# Patient Record
Sex: MALE | Race: BLACK OR AFRICAN AMERICAN | NOT HISPANIC OR LATINO
[De-identification: names, ages, dates, MRNs, and addresses within clinical notes are randomized per-mention and may not be internally consistent; named-entity substitution may affect disease eponyms.]

---

## 2017-01-19 ENCOUNTER — RESULT REVIEW (OUTPATIENT)
Age: 82
End: 2017-01-19

## 2017-07-23 ENCOUNTER — INPATIENT (INPATIENT)
Facility: HOSPITAL | Age: 82
LOS: 4 days | Discharge: HOME | DRG: 674 | End: 2017-07-28
Attending: UROLOGY | Admitting: UROLOGY
Payer: COMMERCIAL

## 2017-07-23 VITALS
HEART RATE: 82 BPM | DIASTOLIC BLOOD PRESSURE: 103 MMHG | TEMPERATURE: 98 F | OXYGEN SATURATION: 99 % | RESPIRATION RATE: 19 BRPM | SYSTOLIC BLOOD PRESSURE: 157 MMHG

## 2017-07-23 DIAGNOSIS — T83.9XXA UNSPECIFIED COMPLICATION OF GENITOURINARY PROSTHETIC DEVICE, IMPLANT AND GRAFT, INITIAL ENCOUNTER: ICD-10-CM

## 2017-07-23 DIAGNOSIS — Z95.0 PRESENCE OF CARDIAC PACEMAKER: Chronic | ICD-10-CM

## 2017-07-23 DIAGNOSIS — Z98.89 OTHER SPECIFIED POSTPROCEDURAL STATES: Chronic | ICD-10-CM

## 2017-07-23 LAB
ALBUMIN SERPL ELPH-MCNC: 3.3 G/DL — SIGNIFICANT CHANGE UP (ref 3.3–5)
ALP SERPL-CCNC: 118 U/L — SIGNIFICANT CHANGE UP (ref 40–120)
ALT FLD-CCNC: 10 U/L RC — SIGNIFICANT CHANGE UP (ref 10–45)
ANION GAP SERPL CALC-SCNC: 13 MMOL/L — SIGNIFICANT CHANGE UP (ref 5–17)
APTT BLD: 30.7 SEC — SIGNIFICANT CHANGE UP (ref 27.5–37.4)
AST SERPL-CCNC: 13 U/L — SIGNIFICANT CHANGE UP (ref 10–40)
BASOPHILS # BLD AUTO: 0 K/UL — SIGNIFICANT CHANGE UP (ref 0–0.2)
BASOPHILS NFR BLD AUTO: 0.3 % — SIGNIFICANT CHANGE UP (ref 0–2)
BILIRUB SERPL-MCNC: 0.4 MG/DL — SIGNIFICANT CHANGE UP (ref 0.2–1.2)
BLD GP AB SCN SERPL QL: NEGATIVE — SIGNIFICANT CHANGE UP
BUN SERPL-MCNC: 16 MG/DL — SIGNIFICANT CHANGE UP (ref 7–23)
CALCIUM SERPL-MCNC: 9 MG/DL — SIGNIFICANT CHANGE UP (ref 8.4–10.5)
CHLORIDE SERPL-SCNC: 109 MMOL/L — HIGH (ref 96–108)
CO2 SERPL-SCNC: 23 MMOL/L — SIGNIFICANT CHANGE UP (ref 22–31)
CREAT SERPL-MCNC: 1.09 MG/DL — SIGNIFICANT CHANGE UP (ref 0.5–1.3)
EOSINOPHIL # BLD AUTO: 0.1 K/UL — SIGNIFICANT CHANGE UP (ref 0–0.5)
EOSINOPHIL NFR BLD AUTO: 1.6 % — SIGNIFICANT CHANGE UP (ref 0–6)
GLUCOSE SERPL-MCNC: 102 MG/DL — HIGH (ref 70–99)
HCT VFR BLD CALC: 38.2 % — LOW (ref 39–50)
HGB BLD-MCNC: 12.2 G/DL — LOW (ref 13–17)
INR BLD: 1.48 RATIO — HIGH (ref 0.88–1.16)
LYMPHOCYTES # BLD AUTO: 1 K/UL — SIGNIFICANT CHANGE UP (ref 1–3.3)
LYMPHOCYTES # BLD AUTO: 17.7 % — SIGNIFICANT CHANGE UP (ref 13–44)
MCHC RBC-ENTMCNC: 30.2 PG — SIGNIFICANT CHANGE UP (ref 27–34)
MCHC RBC-ENTMCNC: 31.9 GM/DL — LOW (ref 32–36)
MCV RBC AUTO: 94.7 FL — SIGNIFICANT CHANGE UP (ref 80–100)
MONOCYTES # BLD AUTO: 0.6 K/UL — SIGNIFICANT CHANGE UP (ref 0–0.9)
MONOCYTES NFR BLD AUTO: 9.6 % — SIGNIFICANT CHANGE UP (ref 2–14)
NEUTROPHILS # BLD AUTO: 4.1 K/UL — SIGNIFICANT CHANGE UP (ref 1.8–7.4)
NEUTROPHILS NFR BLD AUTO: 70.8 % — SIGNIFICANT CHANGE UP (ref 43–77)
PLATELET # BLD AUTO: 161 K/UL — SIGNIFICANT CHANGE UP (ref 150–400)
POTASSIUM SERPL-MCNC: 4 MMOL/L — SIGNIFICANT CHANGE UP (ref 3.5–5.3)
POTASSIUM SERPL-SCNC: 4 MMOL/L — SIGNIFICANT CHANGE UP (ref 3.5–5.3)
PROT SERPL-MCNC: 7.3 G/DL — SIGNIFICANT CHANGE UP (ref 6–8.3)
PROTHROM AB SERPL-ACNC: 16.1 SEC — HIGH (ref 9.8–12.7)
RBC # BLD: 4.03 M/UL — LOW (ref 4.2–5.8)
RBC # FLD: 14.5 % — SIGNIFICANT CHANGE UP (ref 10.3–14.5)
RH IG SCN BLD-IMP: POSITIVE — SIGNIFICANT CHANGE UP
RH IG SCN BLD-IMP: POSITIVE — SIGNIFICANT CHANGE UP
SODIUM SERPL-SCNC: 145 MMOL/L — SIGNIFICANT CHANGE UP (ref 135–145)
WBC # BLD: 5.8 K/UL — SIGNIFICANT CHANGE UP (ref 3.8–10.5)
WBC # FLD AUTO: 5.8 K/UL — SIGNIFICANT CHANGE UP (ref 3.8–10.5)

## 2017-07-23 PROCEDURE — 99285 EMERGENCY DEPT VISIT HI MDM: CPT

## 2017-07-23 PROCEDURE — 71020: CPT | Mod: 26

## 2017-07-23 PROCEDURE — 71010: CPT | Mod: 26

## 2017-07-23 PROCEDURE — 72192 CT PELVIS W/O DYE: CPT | Mod: 26

## 2017-07-23 RX ORDER — PIPERACILLIN AND TAZOBACTAM 4; .5 G/20ML; G/20ML
3.38 INJECTION, POWDER, LYOPHILIZED, FOR SOLUTION INTRAVENOUS EVERY 8 HOURS
Qty: 0 | Refills: 0 | Status: DISCONTINUED | OUTPATIENT
Start: 2017-07-23 | End: 2017-07-26

## 2017-07-23 RX ORDER — METOPROLOL TARTRATE 50 MG
100 TABLET ORAL
Qty: 0 | Refills: 0 | Status: DISCONTINUED | OUTPATIENT
Start: 2017-07-23 | End: 2017-07-26

## 2017-07-23 RX ORDER — MONTELUKAST 4 MG/1
10 TABLET, CHEWABLE ORAL AT BEDTIME
Qty: 0 | Refills: 0 | Status: DISCONTINUED | OUTPATIENT
Start: 2017-07-23 | End: 2017-07-26

## 2017-07-23 RX ORDER — VANCOMYCIN HCL 1 G
1000 VIAL (EA) INTRAVENOUS EVERY 24 HOURS
Qty: 0 | Refills: 0 | Status: DISCONTINUED | OUTPATIENT
Start: 2017-07-23 | End: 2017-07-25

## 2017-07-23 RX ORDER — SODIUM CHLORIDE 9 MG/ML
1000 INJECTION INTRAMUSCULAR; INTRAVENOUS; SUBCUTANEOUS
Qty: 0 | Refills: 0 | Status: DISCONTINUED | OUTPATIENT
Start: 2017-07-23 | End: 2017-07-23

## 2017-07-23 RX ORDER — MAGNESIUM OXIDE 400 MG ORAL TABLET 241.3 MG
400 TABLET ORAL DAILY
Qty: 0 | Refills: 0 | Status: DISCONTINUED | OUTPATIENT
Start: 2017-07-23 | End: 2017-07-26

## 2017-07-23 RX ORDER — SENNA PLUS 8.6 MG/1
1 TABLET ORAL AT BEDTIME
Qty: 0 | Refills: 0 | Status: DISCONTINUED | OUTPATIENT
Start: 2017-07-23 | End: 2017-07-26

## 2017-07-23 RX ORDER — PIPERACILLIN AND TAZOBACTAM 4; .5 G/20ML; G/20ML
3.38 INJECTION, POWDER, LYOPHILIZED, FOR SOLUTION INTRAVENOUS ONCE
Qty: 0 | Refills: 0 | Status: COMPLETED | OUTPATIENT
Start: 2017-07-23 | End: 2017-07-23

## 2017-07-23 RX ORDER — SODIUM CHLORIDE 9 MG/ML
1000 INJECTION, SOLUTION INTRAVENOUS
Qty: 0 | Refills: 0 | Status: DISCONTINUED | OUTPATIENT
Start: 2017-07-23 | End: 2017-07-26

## 2017-07-23 RX ORDER — FERROUS SULFATE 325(65) MG
325 TABLET ORAL DAILY
Qty: 0 | Refills: 0 | Status: DISCONTINUED | OUTPATIENT
Start: 2017-07-23 | End: 2017-07-26

## 2017-07-23 RX ORDER — DONEPEZIL HYDROCHLORIDE 10 MG/1
10 TABLET, FILM COATED ORAL AT BEDTIME
Qty: 0 | Refills: 0 | Status: DISCONTINUED | OUTPATIENT
Start: 2017-07-23 | End: 2017-07-24

## 2017-07-23 RX ORDER — DOCUSATE SODIUM 100 MG
100 CAPSULE ORAL THREE TIMES A DAY
Qty: 0 | Refills: 0 | Status: DISCONTINUED | OUTPATIENT
Start: 2017-07-23 | End: 2017-07-26

## 2017-07-23 RX ORDER — AMIODARONE HYDROCHLORIDE 400 MG/1
100 TABLET ORAL DAILY
Qty: 0 | Refills: 0 | Status: DISCONTINUED | OUTPATIENT
Start: 2017-07-23 | End: 2017-07-26

## 2017-07-23 RX ORDER — PANTOPRAZOLE SODIUM 20 MG/1
40 TABLET, DELAYED RELEASE ORAL
Qty: 0 | Refills: 0 | Status: DISCONTINUED | OUTPATIENT
Start: 2017-07-23 | End: 2017-07-26

## 2017-07-23 RX ORDER — TAMSULOSIN HYDROCHLORIDE 0.4 MG/1
0.4 CAPSULE ORAL AT BEDTIME
Qty: 0 | Refills: 0 | Status: DISCONTINUED | OUTPATIENT
Start: 2017-07-23 | End: 2017-07-26

## 2017-07-23 RX ORDER — ATORVASTATIN CALCIUM 80 MG/1
80 TABLET, FILM COATED ORAL AT BEDTIME
Qty: 0 | Refills: 0 | Status: DISCONTINUED | OUTPATIENT
Start: 2017-07-23 | End: 2017-07-26

## 2017-07-23 RX ORDER — ASPIRIN/CALCIUM CARB/MAGNESIUM 324 MG
81 TABLET ORAL DAILY
Qty: 0 | Refills: 0 | Status: DISCONTINUED | OUTPATIENT
Start: 2017-07-23 | End: 2017-07-25

## 2017-07-23 RX ORDER — VANCOMYCIN HCL 1 G
1000 VIAL (EA) INTRAVENOUS ONCE
Qty: 0 | Refills: 0 | Status: COMPLETED | OUTPATIENT
Start: 2017-07-23 | End: 2017-07-23

## 2017-07-23 RX ORDER — HEPARIN SODIUM 5000 [USP'U]/ML
5000 INJECTION INTRAVENOUS; SUBCUTANEOUS EVERY 8 HOURS
Qty: 0 | Refills: 0 | Status: DISCONTINUED | OUTPATIENT
Start: 2017-07-23 | End: 2017-07-24

## 2017-07-23 RX ADMIN — PIPERACILLIN AND TAZOBACTAM 200 GRAM(S): 4; .5 INJECTION, POWDER, LYOPHILIZED, FOR SOLUTION INTRAVENOUS at 18:50

## 2017-07-23 RX ADMIN — DONEPEZIL HYDROCHLORIDE 10 MILLIGRAM(S): 10 TABLET, FILM COATED ORAL at 22:56

## 2017-07-23 RX ADMIN — Medication 250 MILLIGRAM(S): at 20:06

## 2017-07-23 RX ADMIN — Medication 100 MILLIGRAM(S): at 22:55

## 2017-07-23 RX ADMIN — SENNA PLUS 1 TABLET(S): 8.6 TABLET ORAL at 22:56

## 2017-07-23 RX ADMIN — TAMSULOSIN HYDROCHLORIDE 0.4 MILLIGRAM(S): 0.4 CAPSULE ORAL at 22:56

## 2017-07-23 RX ADMIN — SODIUM CHLORIDE 75 MILLILITER(S): 9 INJECTION, SOLUTION INTRAVENOUS at 22:55

## 2017-07-23 RX ADMIN — HEPARIN SODIUM 5000 UNIT(S): 5000 INJECTION INTRAVENOUS; SUBCUTANEOUS at 22:55

## 2017-07-23 RX ADMIN — SODIUM CHLORIDE 125 MILLILITER(S): 9 INJECTION INTRAMUSCULAR; INTRAVENOUS; SUBCUTANEOUS at 18:50

## 2017-07-23 RX ADMIN — ATORVASTATIN CALCIUM 80 MILLIGRAM(S): 80 TABLET, FILM COATED ORAL at 22:56

## 2017-07-23 RX ADMIN — MONTELUKAST 10 MILLIGRAM(S): 4 TABLET, CHEWABLE ORAL at 22:56

## 2017-07-23 NOTE — PATIENT PROFILE ADULT. - BLOOD TRANSFUSION, PREVIOUS, PROFILE
Addended by: KENYA HERNÁNDEZ on: 3/17/2017 11:36 AM     Modules accepted: Orders    
Addended by: KENYA HERNÁNDEZ on: 3/17/2017 11:38 AM     Modules accepted: Orders    
no

## 2017-07-23 NOTE — ED ADULT NURSE NOTE - PMH
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

## 2017-07-23 NOTE — ED ADULT NURSE REASSESSMENT NOTE - NS ED NURSE REASSESS COMMENT FT1
Patient appears to be resting comfortably in stretcher; denies n/v/d, pain, dizziness, SOB, chest pain; a&ox3; safety and comfort measures provided; spouse at bedside; awaiting CT

## 2017-07-23 NOTE — H&P ADULT - FAMILY HISTORY
<<-----Click on this checkbox to enter Family History Family history of myocardial infarction     Family history of type II diabetes mellitus     Sibling  Still living? Unknown  Family history of breast cancer in sister, Age at diagnosis: Age Unknown

## 2017-07-23 NOTE — H&P ADULT - HISTORY OF PRESENT ILLNESS
89 year old male with history of BPH, non-muscle invasive bladder cancer s/p TURBT, BCG, A fib on eliquis, dementia, presents with eroded penile prosthesis. Patient poor historian, family present to help provide information. Notes having penile prosethesis inserted ~15 years ago at [facility]. Unsure if it is inflatable or semi-rigid. Notes for last several months that prosthesis had eroded through glans/meatus. Main complaint of dysuria. Notes some hematuria, no clots. Denies discharge, fevers/chills. Mild pain, mostly with urination. Seen in office on Friday, prescribed Augmentin which he has been taking. Presents today for preoperative antibiotics.     Patient on Eliquis for a fib, last dose taken this AM. 89 year old male with history of BPH, non-muscle invasive bladder cancer s/p TURBT, BCG, A fib on eliquis, dementia, presents with eroded penile prosthesis. Patient poor historian, family present to help provide information. Notes having penile prosethesis inserted ~15 years ago at [facility]. Unsure if it is inflatable or semi-rigid. Notes for last several months that prosthesis had eroded through glans/meatus. Main complaint of dysuria. Notes some hematuria, no clots. Denies discharge, fevers/chills. Mild pain, mostly with urination. Seen in office on Friday, prescribed Augmentin which he has been taking. Presents today for preoperative antibiotics.     Patient on Eliquis for a fib, last dose taken this AM.

## 2017-07-23 NOTE — H&P ADULT - ASSESSMENT
89M with eroded penile prosthesis, h/o a fib on eliquis, dementia, BPH, bladder cancer.    --urine culture, start vanco and zosyn  --hold eliquis  --CT non-con pelvis to assess for device anatomy  --preoperative labs, CXR, EKG  --obtain medical clearance  --OR planning for removal of penile prosthesis, likely Wednesday 89M with eroded penile prosthesis, h/o a fib on eliquis, dementia, BPH, bladder cancer.    --urine culture, start vanco and zosyn  --hold eliquis  --CT non-con pelvis to assess for device anatomy  --preoperative labs, CXR, EKG  --obtain medical clearance  --OR planning for removal of penile prosthesis, likely Wednesday

## 2017-07-23 NOTE — H&P ADULT - NSHPPHYSICALEXAM_GEN_ALL_CORE
PHYSICAL EXAM:      Constitutional: NAD    Respiratory: non-labored    Gastrointestinal: soft/nt/nd     Genitourinary: circumcised, prosthesis erosion through urethral meatus, no discharge, semi-erect shaft, non-tender to palpation, b/l descended testicles, firm irregularity, presumed pump, +scrotal tenderness    Psychiatric: awake, alert

## 2017-07-23 NOTE — ED ADULT NURSE NOTE - OBJECTIVE STATEMENT
89yo male walked into ED a+ox3, c/o urinary stent malfunction, UTI, hematuria. Pt has urethral stent/penile implant and pump placed 15 years ago, presenting to ED today with hematuria, burning urination, chills for past 3 days. Pt seen by Urologist Friday, placed on abx (unknown name), but told to come to [facility] for stent removal. Pt has had hematuria for 1 week, takes Eliquis for Afib, denies blood clots in urine. Pt denies fevers, n/v/d, abd pain, sob, cp. Pt has hx of mild dementia, as per wife pt gets confused at nighttime. Lung sounds clear.

## 2017-07-23 NOTE — ED PROVIDER NOTE - ATTENDING CONTRIBUTION TO CARE
Pt here for admission for urinary stent removal for concern for infection.  No signs of sepsis, no ab pain or vomiting.  Has urinary stent for impotency, placed about 15yrs ago with hematuria and pain of penis, on antibx already.  Dr. Rdz ([facility]).

## 2017-07-23 NOTE — ED ADULT TRIAGE NOTE - CHIEF COMPLAINT QUOTE
sent in by pmd to check urinary stent. as per family, pt states pt was hematuric last week and sent to hospital to check stent placement.

## 2017-07-23 NOTE — H&P ADULT - PMH
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

## 2017-07-23 NOTE — ED PROVIDER NOTE - PHYSICAL EXAMINATION
NCAT AAOX3, NAD, MMM, Neck without lymphadenopathy. HR irregular, breath sounds symmetric, abd s/nt/nd, no edema. ext well perfused. no rash

## 2017-07-23 NOTE — ED PROVIDER NOTE - NS ED ROS FT
No fever/chills, no change in vision, no throat pain, no chest pain, no abdominal pain, no nausea/vomiting,  no dysuria, no new joint pain, no rashes, no new focal numbness or weakness, no known mental health issues

## 2017-07-23 NOTE — H&P ADULT - ATTENDING COMMENTS
Patient seen and examined. penile prosthesis erosion into urethra, pump tethered to skin in scrotum.

## 2017-07-24 LAB
ANION GAP SERPL CALC-SCNC: 10 MMOL/L — SIGNIFICANT CHANGE UP (ref 5–17)
APPEARANCE UR: CLEAR — SIGNIFICANT CHANGE UP
BASOPHILS # BLD AUTO: 0 K/UL — SIGNIFICANT CHANGE UP (ref 0–0.2)
BASOPHILS NFR BLD AUTO: 0.2 % — SIGNIFICANT CHANGE UP (ref 0–2)
BILIRUB UR-MCNC: NEGATIVE — SIGNIFICANT CHANGE UP
BUN SERPL-MCNC: 12 MG/DL — SIGNIFICANT CHANGE UP (ref 7–23)
CALCIUM SERPL-MCNC: 8.6 MG/DL — SIGNIFICANT CHANGE UP (ref 8.4–10.5)
CHLORIDE SERPL-SCNC: 107 MMOL/L — SIGNIFICANT CHANGE UP (ref 96–108)
CO2 SERPL-SCNC: 25 MMOL/L — SIGNIFICANT CHANGE UP (ref 22–31)
COLOR SPEC: YELLOW — SIGNIFICANT CHANGE UP
CREAT SERPL-MCNC: 0.81 MG/DL — SIGNIFICANT CHANGE UP (ref 0.5–1.3)
DIFF PNL FLD: NEGATIVE — SIGNIFICANT CHANGE UP
EOSINOPHIL # BLD AUTO: 0.1 K/UL — SIGNIFICANT CHANGE UP (ref 0–0.5)
EOSINOPHIL NFR BLD AUTO: 2.6 % — SIGNIFICANT CHANGE UP (ref 0–6)
GLUCOSE SERPL-MCNC: 88 MG/DL — SIGNIFICANT CHANGE UP (ref 70–99)
GLUCOSE UR QL: NEGATIVE — SIGNIFICANT CHANGE UP
HCT VFR BLD CALC: 35.7 % — LOW (ref 39–50)
HGB BLD-MCNC: 11.4 G/DL — LOW (ref 13–17)
KETONES UR-MCNC: NEGATIVE — SIGNIFICANT CHANGE UP
LEUKOCYTE ESTERASE UR-ACNC: ABNORMAL
LYMPHOCYTES # BLD AUTO: 1 K/UL — SIGNIFICANT CHANGE UP (ref 1–3.3)
LYMPHOCYTES # BLD AUTO: 20.3 % — SIGNIFICANT CHANGE UP (ref 13–44)
MCHC RBC-ENTMCNC: 30.1 PG — SIGNIFICANT CHANGE UP (ref 27–34)
MCHC RBC-ENTMCNC: 32 GM/DL — SIGNIFICANT CHANGE UP (ref 32–36)
MCV RBC AUTO: 93.9 FL — SIGNIFICANT CHANGE UP (ref 80–100)
MONOCYTES # BLD AUTO: 0.6 K/UL — SIGNIFICANT CHANGE UP (ref 0–0.9)
MONOCYTES NFR BLD AUTO: 13.5 % — SIGNIFICANT CHANGE UP (ref 2–14)
NEUTROPHILS # BLD AUTO: 3 K/UL — SIGNIFICANT CHANGE UP (ref 1.8–7.4)
NEUTROPHILS NFR BLD AUTO: 63.4 % — SIGNIFICANT CHANGE UP (ref 43–77)
NITRITE UR-MCNC: NEGATIVE — SIGNIFICANT CHANGE UP
PH UR: 5.5 — SIGNIFICANT CHANGE UP (ref 5–8)
PLATELET # BLD AUTO: 141 K/UL — LOW (ref 150–400)
POTASSIUM SERPL-MCNC: 3.7 MMOL/L — SIGNIFICANT CHANGE UP (ref 3.5–5.3)
POTASSIUM SERPL-SCNC: 3.7 MMOL/L — SIGNIFICANT CHANGE UP (ref 3.5–5.3)
PROT UR-MCNC: SIGNIFICANT CHANGE UP
RBC # BLD: 3.81 M/UL — LOW (ref 4.2–5.8)
RBC # FLD: 14.3 % — SIGNIFICANT CHANGE UP (ref 10.3–14.5)
RBC CASTS # UR COMP ASSIST: SIGNIFICANT CHANGE UP /HPF (ref 0–2)
SODIUM SERPL-SCNC: 142 MMOL/L — SIGNIFICANT CHANGE UP (ref 135–145)
SP GR SPEC: 1.02 — SIGNIFICANT CHANGE UP (ref 1.01–1.02)
UROBILINOGEN FLD QL: NEGATIVE — SIGNIFICANT CHANGE UP
WBC # BLD: 4.8 K/UL — SIGNIFICANT CHANGE UP (ref 3.8–10.5)
WBC # FLD AUTO: 4.8 K/UL — SIGNIFICANT CHANGE UP (ref 3.8–10.5)
WBC UR QL: SIGNIFICANT CHANGE UP /HPF (ref 0–5)

## 2017-07-24 PROCEDURE — 99222 1ST HOSP IP/OBS MODERATE 55: CPT

## 2017-07-24 RX ORDER — POTASSIUM CHLORIDE 20 MEQ
20 PACKET (EA) ORAL ONCE
Qty: 0 | Refills: 0 | Status: COMPLETED | OUTPATIENT
Start: 2017-07-24 | End: 2017-07-24

## 2017-07-24 RX ORDER — ACETAMINOPHEN 500 MG
650 TABLET ORAL EVERY 6 HOURS
Qty: 0 | Refills: 0 | Status: DISCONTINUED | OUTPATIENT
Start: 2017-07-24 | End: 2017-07-26

## 2017-07-24 RX ORDER — LISINOPRIL 2.5 MG/1
10 TABLET ORAL DAILY
Qty: 0 | Refills: 0 | Status: DISCONTINUED | OUTPATIENT
Start: 2017-07-24 | End: 2017-07-26

## 2017-07-24 RX ORDER — ENOXAPARIN SODIUM 100 MG/ML
80 INJECTION SUBCUTANEOUS
Qty: 0 | Refills: 0 | Status: DISCONTINUED | OUTPATIENT
Start: 2017-07-24 | End: 2017-07-25

## 2017-07-24 RX ADMIN — Medication 100 MILLIGRAM(S): at 18:43

## 2017-07-24 RX ADMIN — Medication 100 MILLIGRAM(S): at 05:38

## 2017-07-24 RX ADMIN — LISINOPRIL 10 MILLIGRAM(S): 2.5 TABLET ORAL at 21:15

## 2017-07-24 RX ADMIN — ENOXAPARIN SODIUM 80 MILLIGRAM(S): 100 INJECTION SUBCUTANEOUS at 18:43

## 2017-07-24 RX ADMIN — MONTELUKAST 10 MILLIGRAM(S): 4 TABLET, CHEWABLE ORAL at 21:14

## 2017-07-24 RX ADMIN — Medication 100 MILLIGRAM(S): at 11:59

## 2017-07-24 RX ADMIN — Medication 650 MILLIGRAM(S): at 19:46

## 2017-07-24 RX ADMIN — PIPERACILLIN AND TAZOBACTAM 25 GRAM(S): 4; .5 INJECTION, POWDER, LYOPHILIZED, FOR SOLUTION INTRAVENOUS at 03:12

## 2017-07-24 RX ADMIN — HEPARIN SODIUM 5000 UNIT(S): 5000 INJECTION INTRAVENOUS; SUBCUTANEOUS at 05:38

## 2017-07-24 RX ADMIN — Medication 20 MILLIEQUIVALENT(S): at 12:00

## 2017-07-24 RX ADMIN — Medication 100 MILLIGRAM(S): at 21:14

## 2017-07-24 RX ADMIN — Medication 650 MILLIGRAM(S): at 20:16

## 2017-07-24 RX ADMIN — Medication 325 MILLIGRAM(S): at 12:00

## 2017-07-24 RX ADMIN — Medication 100 MILLIGRAM(S): at 05:37

## 2017-07-24 RX ADMIN — AMIODARONE HYDROCHLORIDE 100 MILLIGRAM(S): 400 TABLET ORAL at 05:37

## 2017-07-24 RX ADMIN — SODIUM CHLORIDE 75 MILLILITER(S): 9 INJECTION, SOLUTION INTRAVENOUS at 05:38

## 2017-07-24 RX ADMIN — Medication 250 MILLIGRAM(S): at 23:17

## 2017-07-24 RX ADMIN — SENNA PLUS 1 TABLET(S): 8.6 TABLET ORAL at 21:15

## 2017-07-24 RX ADMIN — PIPERACILLIN AND TAZOBACTAM 25 GRAM(S): 4; .5 INJECTION, POWDER, LYOPHILIZED, FOR SOLUTION INTRAVENOUS at 09:55

## 2017-07-24 RX ADMIN — MAGNESIUM OXIDE 400 MG ORAL TABLET 400 MILLIGRAM(S): 241.3 TABLET ORAL at 11:59

## 2017-07-24 RX ADMIN — Medication 81 MILLIGRAM(S): at 11:59

## 2017-07-24 RX ADMIN — PANTOPRAZOLE SODIUM 40 MILLIGRAM(S): 20 TABLET, DELAYED RELEASE ORAL at 05:37

## 2017-07-24 RX ADMIN — ATORVASTATIN CALCIUM 80 MILLIGRAM(S): 80 TABLET, FILM COATED ORAL at 21:15

## 2017-07-24 RX ADMIN — PIPERACILLIN AND TAZOBACTAM 25 GRAM(S): 4; .5 INJECTION, POWDER, LYOPHILIZED, FOR SOLUTION INTRAVENOUS at 18:43

## 2017-07-24 RX ADMIN — TAMSULOSIN HYDROCHLORIDE 0.4 MILLIGRAM(S): 0.4 CAPSULE ORAL at 21:15

## 2017-07-24 NOTE — PROGRESS NOTE ADULT - SUBJECTIVE AND OBJECTIVE BOX
Subjective    Patient seen and examined. No overnight events. No fevers. Denies pain.    Objective    Vital signs  T(F): , Max: 98.6 (07-23-17 @ 18:00)  HR: 72 (07-24-17 @ 05:36)  BP: 154/90 (07-24-17 @ 05:36)  SpO2: 98% (07-24-17 @ 05:36)  Wt(kg): --    Output     07-23 @ 07:01  -  07-24 @ 07:00  --------------------------------------------------------  IN: 1165 mL / OUT: 250 mL / NET: 915 mL        General: NAD  Abdomen: soft/non-tender/non-distended  : eroded penile prosthesis through glans, no purulence    Labs      07-24 @ 06:43    WBC 4.8   / Hct 35.7  / SCr 0.81     07-23 @ 18:15    WBC 5.8   / Hct 38.2  / SCr 1.09       Urine Cx: P      Imaging

## 2017-07-24 NOTE — CONSULT NOTE ADULT - SUBJECTIVE AND OBJECTIVE BOX
CHIEF COMPLAINT: pre op    HISTORY OF PRESENT ILLNESS:  history from chart due to pt mild dementia   89 year old male with history of BPH, non-muscle invasive bladder cancer s/p TURBT, BCG, A fib on eliquis, dementia, presents with eroded penile prosthesis. Patient poor historian, family present to help provide information. Notes having penile prosethesis inserted ~15 years ago at [facility]. Unsure if it is inflatable or semi-rigid. Notes for last several months that prosthesis had eroded through glans/meatus. Main complaint of dysuria. Notes some hematuria, no clots. Denies discharge, fevers/chills. Mild pain, mostly with urination. now planned for penile implant extraction   He denies any chest pain or sob     Patient on Eliquis for a fib, last dose taken yesterday    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  BPH (benign prostatic hypertrophy)  Hematuria  Asthma, mild intermittent: last use of rescue inhaler 2 weeks ago  Dyslipidemia  HTN (hypertension), benign  Atrial fibrillation: diagnosed 4 years ago treated medically, on coumadin  History of permanent cardiac pacemaker placement  S/P TURP          MEDICATIONS:  heparin  Injectable 5000 Unit(s) SubCutaneous every 8 hours  aspirin enteric coated 81 milliGRAM(s) Oral daily  tamsulosin 0.4 milliGRAM(s) Oral at bedtime  amiodarone    Tablet 100 milliGRAM(s) Oral daily  metoprolol 100 milliGRAM(s) Oral two times a day  lisinopril 10 milliGRAM(s) Oral daily    vancomycin  IVPB 1000 milliGRAM(s) IV Intermittent every 24 hours  piperacillin/tazobactam IVPB. 3.375 Gram(s) IV Intermittent every 8 hours    montelukast 10 milliGRAM(s) Oral at bedtime    donepezil 10 milliGRAM(s) Oral at bedtime    docusate sodium 100 milliGRAM(s) Oral three times a day  senna 1 Tablet(s) Oral at bedtime  pantoprazole    Tablet 40 milliGRAM(s) Oral before breakfast    atorvastatin 80 milliGRAM(s) Oral at bedtime    sodium chloride 0.45%. 1000 milliLiter(s) IV Continuous <Continuous>  ferrous    sulfate 325 milliGRAM(s) Oral daily  magnesium oxide 400 milliGRAM(s) Oral daily  potassium chloride    Tablet ER 20 milliEquivalent(s) Oral once      FAMILY HISTORY:  Family history of breast cancer in sister (Sibling)  Family history of type II diabetes mellitus  Family history of myocardial infarction      Non-contributory    SOCIAL HISTORY:    No tobacco, drugs or etoh    Allergies    No Known Allergies    Intolerances    	    REVIEW OF SYSTEMS:  as above  The rest of the 14 points ROS reviewed and except above they are unremarkable.        PHYSICAL EXAM:  T(C): 36.6 (07-24-17 @ 05:36), Max: 37 (07-23-17 @ 18:00)  HR: 72 (07-24-17 @ 05:36) (62 - 82)  BP: 154/90 (07-24-17 @ 05:36) (140/78 - 165/90)  RR: 18 (07-24-17 @ 05:36) (16 - 19)  SpO2: 98% (07-24-17 @ 05:36) (97% - 99%)  Wt(kg): --  I&O's Summary    23 Jul 2017 07:01  -  24 Jul 2017 07:00  --------------------------------------------------------  IN: 1165 mL / OUT: 250 mL / NET: 915 mL        Appearance: Normal	  HEENT:   Normal oral mucosa, PERRL, EOMI	  Cardiovascular: Normal S1 S2,    Murmur:   Neck: JVP normal  Respiratory: Lungs clear to auscultation  Gastrointestinal:  Soft, Non-tender, + BS	  Skin: normal   Neuro: No gross deficits.   Psychiatry:  Mood & affect appropriate  Ext: No edema    TELEMETRY: 	    ECG:  	  RADIOLOGY:  OTHER: 	  	  LABS:	 	    CARDIAC MARKERS:                                  11.4   4.8   )-----------( 141      ( 24 Jul 2017 06:43 )             35.7     07-24    142  |  107  |  12  ----------------------------<  88  3.7   |  25  |  0.81    Ca    8.6      24 Jul 2017 06:43    TPro  7.3  /  Alb  3.3  /  TBili  0.4  /  DBili  x   /  AST  13  /  ALT  10  /  AlkPhos  118  07-23    proBNP:   Lipid Profile:   HgA1c:   TSH:      < from: Transthoracic Echocardiogram (02.12.16 @ 13:30) >  CONCLUSIONS:  1. Mitral annular calcification, otherwise normal mitral  valve. Mild mitral regurgitation.  2. Calcified trileaflet aortic valve with decreased  opening. Peak transaortic valve gradient equals 21 mm Hg,  mean transaortic valve gradient equals 11 mm Hg, consistent  with mild aortic stenosis. Mild aortic regurgitation.  3. Moderately dilated left atrium.  LA volume index = 43  cc/m2.  4. Normal left ventricular internal dimensions and wall  thicknesses.  5. Endocardium not well visualized; grossly mild global  left ventricular systolic dysfunction.  6. Severe right atrial enlargement.  7. The right ventricle is not well visualized; grossly  normal right ventricular systolic function.  A device wire  is noted in the right heart.  8. Estimated right ventricular systolic pressure equals 42  mm Hg, assuming right atrial pressure equals 10 mm Hg,  consistent with mild pulmonary hypertension.  *** Compared with echocardiogram of 10/14/2015, no  significant changes noted.  -----------------------------------------    < end of copied text >  ekg  afib , paced

## 2017-07-24 NOTE — PROGRESS NOTE ADULT - ATTENDING COMMENTS
patient to hold eliquis prior to surgery. bridge with lovenox.  continue antibiotics, surgery plan for Wednesday

## 2017-07-24 NOTE — CONSULT NOTE ADULT - ASSESSMENT
PRE OP  Based on current ACC/AHA guidelines, patient history and physical exam, the patient is considered to have elevated risk but optimized for OR and no objection to proceed.    AFIB  paced , HR controlled. eliquis on hold from yesterday The calculated [id number] score is 6 , start lovenox for a/c till surgery then change to eliquis after OR once deemed safe.  dc Aricept given high risk of qt prolongation with amio.    HTN  stable  cont current meds    Chronic mild systolic chf  stable  cont bb/ace

## 2017-07-25 LAB
CULTURE RESULTS: NO GROWTH — SIGNIFICANT CHANGE UP
SPECIMEN SOURCE: SIGNIFICANT CHANGE UP
VANCOMYCIN TROUGH SERPL-MCNC: 7 UG/ML — LOW (ref 10–20)

## 2017-07-25 PROCEDURE — 99232 SBSQ HOSP IP/OBS MODERATE 35: CPT | Mod: 57

## 2017-07-25 RX ORDER — VANCOMYCIN HCL 1 G
1000 VIAL (EA) INTRAVENOUS EVERY 12 HOURS
Qty: 0 | Refills: 0 | Status: DISCONTINUED | OUTPATIENT
Start: 2017-07-26 | End: 2017-07-26

## 2017-07-25 RX ORDER — VANCOMYCIN HCL 1 G
VIAL (EA) INTRAVENOUS
Qty: 0 | Refills: 0 | Status: DISCONTINUED | OUTPATIENT
Start: 2017-07-25 | End: 2017-07-26

## 2017-07-25 RX ORDER — VANCOMYCIN HCL 1 G
1000 VIAL (EA) INTRAVENOUS ONCE
Qty: 0 | Refills: 0 | Status: COMPLETED | OUTPATIENT
Start: 2017-07-25 | End: 2017-07-25

## 2017-07-25 RX ADMIN — Medication 100 MILLIGRAM(S): at 05:38

## 2017-07-25 RX ADMIN — TAMSULOSIN HYDROCHLORIDE 0.4 MILLIGRAM(S): 0.4 CAPSULE ORAL at 21:52

## 2017-07-25 RX ADMIN — Medication 100 MILLIGRAM(S): at 21:52

## 2017-07-25 RX ADMIN — LISINOPRIL 10 MILLIGRAM(S): 2.5 TABLET ORAL at 05:38

## 2017-07-25 RX ADMIN — SODIUM CHLORIDE 75 MILLILITER(S): 9 INJECTION, SOLUTION INTRAVENOUS at 17:46

## 2017-07-25 RX ADMIN — SENNA PLUS 1 TABLET(S): 8.6 TABLET ORAL at 21:52

## 2017-07-25 RX ADMIN — AMIODARONE HYDROCHLORIDE 100 MILLIGRAM(S): 400 TABLET ORAL at 05:38

## 2017-07-25 RX ADMIN — ENOXAPARIN SODIUM 80 MILLIGRAM(S): 100 INJECTION SUBCUTANEOUS at 17:46

## 2017-07-25 RX ADMIN — ATORVASTATIN CALCIUM 80 MILLIGRAM(S): 80 TABLET, FILM COATED ORAL at 21:52

## 2017-07-25 RX ADMIN — ENOXAPARIN SODIUM 80 MILLIGRAM(S): 100 INJECTION SUBCUTANEOUS at 05:38

## 2017-07-25 RX ADMIN — PIPERACILLIN AND TAZOBACTAM 25 GRAM(S): 4; .5 INJECTION, POWDER, LYOPHILIZED, FOR SOLUTION INTRAVENOUS at 17:46

## 2017-07-25 RX ADMIN — Medication 250 MILLIGRAM(S): at 23:03

## 2017-07-25 RX ADMIN — Medication 100 MILLIGRAM(S): at 17:46

## 2017-07-25 RX ADMIN — MONTELUKAST 10 MILLIGRAM(S): 4 TABLET, CHEWABLE ORAL at 21:52

## 2017-07-25 RX ADMIN — Medication 325 MILLIGRAM(S): at 11:05

## 2017-07-25 RX ADMIN — PIPERACILLIN AND TAZOBACTAM 25 GRAM(S): 4; .5 INJECTION, POWDER, LYOPHILIZED, FOR SOLUTION INTRAVENOUS at 02:07

## 2017-07-25 RX ADMIN — Medication 650 MILLIGRAM(S): at 05:38

## 2017-07-25 RX ADMIN — Medication 650 MILLIGRAM(S): at 06:08

## 2017-07-25 RX ADMIN — PIPERACILLIN AND TAZOBACTAM 25 GRAM(S): 4; .5 INJECTION, POWDER, LYOPHILIZED, FOR SOLUTION INTRAVENOUS at 11:05

## 2017-07-25 RX ADMIN — MAGNESIUM OXIDE 400 MG ORAL TABLET 400 MILLIGRAM(S): 241.3 TABLET ORAL at 11:05

## 2017-07-25 RX ADMIN — PANTOPRAZOLE SODIUM 40 MILLIGRAM(S): 20 TABLET, DELAYED RELEASE ORAL at 05:38

## 2017-07-25 RX ADMIN — Medication 100 MILLIGRAM(S): at 13:23

## 2017-07-25 NOTE — PROGRESS NOTE ADULT - SUBJECTIVE AND OBJECTIVE BOX
UROLOGY PA PROGRESS NOTE:     Subjective: no acute events overnight      Objective:  Vital signs  T(F): , Max: 98.2 (17 @ 21:12)  HR: 84 (17 @ 05:38)  BP: 148/89 (17 @ 05:38)  SpO2: 97% (17 @ 05:38)  Wt(kg): --    Output      @ 07:01  -   @ 07:00  --------------------------------------------------------  IN: 1165 mL / OUT: 250 mL / NET: 915 mL     @ 07:01  -   @ 06:46  --------------------------------------------------------  IN: 3280 mL / OUT: 1225 mL / NET: 2055 mL        Physical Exam:  Gen: nad  Abd: soft, NT/ND  : eroded penile prosthesis through glans. +TTP and swelling around pump in scrotum. otherwise nontender.    Labs:        4.8   / 35.7<L> /0.81         5.8   / 38.2<L> /1.09                           11.4   4.8   )-----------( 141      ( 2017 06:43 )             35.7         142  |  107  |  12  ----------------------------<  88  3.7   |  25  |  0.81    Ca    8.6      2017 06:43    TPro  7.3  /  Alb  3.3  /  TBili  0.4  /  DBili  x   /  AST  13  /  ALT  10  /  AlkPhos  118      PT/INR - ( 2017 18:15 )   PT: 16.1 sec;   INR: 1.48 ratio         PTT - ( 2017 18:15 )  PTT:30.7 sec  Urinalysis Basic - ( 2017 01:08 )    Color: Yellow / Appearance: Clear / S.024 / pH: x  Gluc: x / Ketone: Negative  / Bili: Negative / Urobili: Negative   Blood: x / Protein: Trace / Nitrite: Negative   Leuk Esterase: Moderate / RBC: 3-5 /HPF / WBC 11-25 /HPF   Sq Epi: x / Non Sq Epi: x / Bacteria: x

## 2017-07-25 NOTE — PROGRESS NOTE ADULT - SUBJECTIVE AND OBJECTIVE BOX
Urology Preop Note    Patient is a 89y old  Male who presents with a chief complaint of eroded penile prosthesis (2017 18:44)      Diagnosis: Erosion of penile prosthesis  Procedure: Removal of penile prosthesis  Surgeon: Dr Jimmy Rdz                            11.4   4.8   )-----------( 141      ( 2017 06:43 )             35.7       07-24    142  |  107  |  12  ----------------------------<  88  3.7   |  25  |  0.81    Ca    8.6      2017 06:43    TPro  7.3  /  Alb  3.3  /  TBili  0.4  /  DBili  x   /  AST  13  /  ALT  10  /  AlkPhos  118  07-23      PT/INR - ( 2017 18:15 )   PT: 16.1 sec;   INR: 1.48 ratio         PTT - ( 2017 18:15 )  PTT:30.7 sec    Urinalysis Basic - ( 2017 01:08 )    Color: Yellow / Appearance: Clear / S.024 / pH: x  Gluc: x / Ketone: Negative  / Bili: Negative / Urobili: Negative   Blood: x / Protein: Trace / Nitrite: Negative   Leuk Esterase: Moderate / RBC: 3-5 /HPF / WBC 11-25 /HPF   Sq Epi: x / Non Sq Epi: x / Bacteria: x        Urine Culture:     Chest X-ray: clear lungs    EKG: normal sinus rhythm / pending    Orders: NPO/IVF    Consent: pending    [x] Clearance - cleared from Cardiology service, awaiting medicine clearance Urology Preop Note    Patient is a 89y old  Male who presents with a chief complaint of eroded penile prosthesis (2017 18:44)      Diagnosis: Erosion of penile prosthesis  Procedure: Removal of penile prosthesis  Surgeon: Dr Jimmy Rdz                            11.4   4.8   )-----------( 141      ( 2017 06:43 )             35.7       07-24    142  |  107  |  12  ----------------------------<  88  3.7   |  25  |  0.81    Ca    8.6      2017 06:43    TPro  7.3  /  Alb  3.3  /  TBili  0.4  /  DBili  x   /  AST  13  /  ALT  10  /  AlkPhos  118  07-23      PT/INR - ( 2017 18:15 )   PT: 16.1 sec;   INR: 1.48 ratio         PTT - ( 2017 18:15 )  PTT:30.7 sec    Urinalysis Basic - ( 2017 01:08 )    Color: Yellow / Appearance: Clear / S.024 / pH: x  Gluc: x / Ketone: Negative  / Bili: Negative / Urobili: Negative   Blood: x / Protein: Trace / Nitrite: Negative   Leuk Esterase: Moderate / RBC: 3-5 /HPF / WBC 11-25 /HPF   Sq Epi: x / Non Sq Epi: x / Bacteria: x        Urine Culture: pending results    Chest X-ray: clear lungs    EKG: pacemaker    Orders: NPO/IVF    Consent: pending    [x] Clearance - cleared from Cardiology service, awaiting medicine clearance

## 2017-07-25 NOTE — PROGRESS NOTE ADULT - SUBJECTIVE AND OBJECTIVE BOX
Subjective: Patient seen and examined. No new events except as noted.     SUBJECTIVE/ROS:  No chest pain, or sob.     MEDICATIONS:  MEDICATIONS  (STANDING):  sodium chloride 0.45%. 1000 milliLiter(s) (75 mL/Hr) IV Continuous <Continuous>  vancomycin  IVPB 1000 milliGRAM(s) IV Intermittent every 24 hours  piperacillin/tazobactam IVPB. 3.375 Gram(s) IV Intermittent every 8 hours  aspirin enteric coated 81 milliGRAM(s) Oral daily  tamsulosin 0.4 milliGRAM(s) Oral at bedtime  amiodarone    Tablet 100 milliGRAM(s) Oral daily  atorvastatin 80 milliGRAM(s) Oral at bedtime  metoprolol 100 milliGRAM(s) Oral two times a day  ferrous    sulfate 325 milliGRAM(s) Oral daily  docusate sodium 100 milliGRAM(s) Oral three times a day  senna 1 Tablet(s) Oral at bedtime  magnesium oxide 400 milliGRAM(s) Oral daily  montelukast 10 milliGRAM(s) Oral at bedtime  pantoprazole    Tablet 40 milliGRAM(s) Oral before breakfast  lisinopril 10 milliGRAM(s) Oral daily  enoxaparin Injectable 80 milliGRAM(s) SubCutaneous two times a day      PHYSICAL EXAM:  T(C): 36.3 (07-25-17 @ 05:38), Max: 36.8 (07-24-17 @ 21:12)  HR: 84 (07-25-17 @ 05:38) (59 - 100)  BP: 148/89 (07-25-17 @ 05:38) (107/68 - 148/89)  RR: 16 (07-25-17 @ 05:38) (16 - 18)  SpO2: 97% (07-25-17 @ 05:38) (97% - 100%)  Wt(kg): --  I&O's Summary    23 Jul 2017 07:01  -  24 Jul 2017 07:00  --------------------------------------------------------  IN: 1165 mL / OUT: 250 mL / NET: 915 mL    24 Jul 2017 07:01  -  25 Jul 2017 06:59  --------------------------------------------------------  IN: 3280 mL / OUT: 1225 mL / NET: 2055 mL          Appearance: Normal	  HEENT:   Normal oral mucosa, PERRL, EOMI	  Cardiovascular: Normal S1 S2,    Murmur:   Neck: JVP normal  Respiratory: Lungs clear to auscultation  Gastrointestinal:  Soft, Non-tender, + BS	  Skin: normal   Neuro: No gross deficits.   Psychiatry:  Mood & affect appropriate  Ext: No edema        LABS:    CARDIAC MARKERS:                                11.4   4.8   )-----------( 141      ( 24 Jul 2017 06:43 )             35.7     07-24    142  |  107  |  12  ----------------------------<  88  3.7   |  25  |  0.81    Ca    8.6      24 Jul 2017 06:43    TPro  7.3  /  Alb  3.3  /  TBili  0.4  /  DBili  x   /  AST  13  /  ALT  10  /  AlkPhos  118  07-23    proBNP:   Lipid Profile:   HgA1c:   TSH:           TELEMETRY: 	    ECG:  	  RADIOLOGY:   DIAGNOSTIC TESTING:  Echocardiogram:  Catheterization:  Stress Test:    OTHER:

## 2017-07-26 ENCOUNTER — TRANSCRIPTION ENCOUNTER (OUTPATIENT)
Age: 82
End: 2017-07-26

## 2017-07-26 ENCOUNTER — APPOINTMENT (OUTPATIENT)
Dept: UROLOGY | Facility: HOSPITAL | Age: 82
End: 2017-07-26

## 2017-07-26 PROCEDURE — 54406 REMOVE MUTI-COMP PENIS PROS: CPT

## 2017-07-26 RX ORDER — MONTELUKAST 4 MG/1
10 TABLET, CHEWABLE ORAL AT BEDTIME
Qty: 0 | Refills: 0 | Status: DISCONTINUED | OUTPATIENT
Start: 2017-07-26 | End: 2017-07-28

## 2017-07-26 RX ORDER — ASPIRIN/CALCIUM CARB/MAGNESIUM 324 MG
81 TABLET ORAL DAILY
Qty: 0 | Refills: 0 | Status: DISCONTINUED | OUTPATIENT
Start: 2017-07-26 | End: 2017-07-28

## 2017-07-26 RX ORDER — AMIODARONE HYDROCHLORIDE 400 MG/1
100 TABLET ORAL DAILY
Qty: 0 | Refills: 0 | Status: DISCONTINUED | OUTPATIENT
Start: 2017-07-26 | End: 2017-07-28

## 2017-07-26 RX ORDER — SENNA PLUS 8.6 MG/1
1 TABLET ORAL AT BEDTIME
Qty: 0 | Refills: 0 | Status: DISCONTINUED | OUTPATIENT
Start: 2017-07-26 | End: 2017-07-28

## 2017-07-26 RX ORDER — VANCOMYCIN HCL 1 G
1000 VIAL (EA) INTRAVENOUS EVERY 12 HOURS
Qty: 0 | Refills: 0 | Status: DISCONTINUED | OUTPATIENT
Start: 2017-07-26 | End: 2017-07-28

## 2017-07-26 RX ORDER — TAMSULOSIN HYDROCHLORIDE 0.4 MG/1
0.4 CAPSULE ORAL AT BEDTIME
Qty: 0 | Refills: 0 | Status: DISCONTINUED | OUTPATIENT
Start: 2017-07-26 | End: 2017-07-28

## 2017-07-26 RX ORDER — ATORVASTATIN CALCIUM 80 MG/1
80 TABLET, FILM COATED ORAL AT BEDTIME
Qty: 0 | Refills: 0 | Status: DISCONTINUED | OUTPATIENT
Start: 2017-07-26 | End: 2017-07-28

## 2017-07-26 RX ORDER — FERROUS SULFATE 325(65) MG
325 TABLET ORAL DAILY
Qty: 0 | Refills: 0 | Status: DISCONTINUED | OUTPATIENT
Start: 2017-07-26 | End: 2017-07-28

## 2017-07-26 RX ORDER — METOPROLOL TARTRATE 50 MG
100 TABLET ORAL
Qty: 0 | Refills: 0 | Status: DISCONTINUED | OUTPATIENT
Start: 2017-07-26 | End: 2017-07-28

## 2017-07-26 RX ORDER — LISINOPRIL 2.5 MG/1
10 TABLET ORAL DAILY
Qty: 0 | Refills: 0 | Status: DISCONTINUED | OUTPATIENT
Start: 2017-07-26 | End: 2017-07-28

## 2017-07-26 RX ORDER — SODIUM CHLORIDE 9 MG/ML
1000 INJECTION, SOLUTION INTRAVENOUS
Qty: 0 | Refills: 0 | Status: DISCONTINUED | OUTPATIENT
Start: 2017-07-26 | End: 2017-07-28

## 2017-07-26 RX ORDER — ACETAMINOPHEN 500 MG
650 TABLET ORAL EVERY 6 HOURS
Qty: 0 | Refills: 0 | Status: DISCONTINUED | OUTPATIENT
Start: 2017-07-26 | End: 2017-07-28

## 2017-07-26 RX ORDER — PANTOPRAZOLE SODIUM 20 MG/1
40 TABLET, DELAYED RELEASE ORAL
Qty: 0 | Refills: 0 | Status: DISCONTINUED | OUTPATIENT
Start: 2017-07-26 | End: 2017-07-28

## 2017-07-26 RX ORDER — DOCUSATE SODIUM 100 MG
100 CAPSULE ORAL THREE TIMES A DAY
Qty: 0 | Refills: 0 | Status: DISCONTINUED | OUTPATIENT
Start: 2017-07-26 | End: 2017-07-28

## 2017-07-26 RX ORDER — MAGNESIUM OXIDE 400 MG ORAL TABLET 241.3 MG
400 TABLET ORAL DAILY
Qty: 0 | Refills: 0 | Status: DISCONTINUED | OUTPATIENT
Start: 2017-07-26 | End: 2017-07-28

## 2017-07-26 RX ADMIN — LISINOPRIL 10 MILLIGRAM(S): 2.5 TABLET ORAL at 06:32

## 2017-07-26 RX ADMIN — PIPERACILLIN AND TAZOBACTAM 25 GRAM(S): 4; .5 INJECTION, POWDER, LYOPHILIZED, FOR SOLUTION INTRAVENOUS at 11:11

## 2017-07-26 RX ADMIN — Medication 250 MILLIGRAM(S): at 17:04

## 2017-07-26 RX ADMIN — AMIODARONE HYDROCHLORIDE 100 MILLIGRAM(S): 400 TABLET ORAL at 06:31

## 2017-07-26 RX ADMIN — ATORVASTATIN CALCIUM 80 MILLIGRAM(S): 80 TABLET, FILM COATED ORAL at 21:15

## 2017-07-26 RX ADMIN — PIPERACILLIN AND TAZOBACTAM 25 GRAM(S): 4; .5 INJECTION, POWDER, LYOPHILIZED, FOR SOLUTION INTRAVENOUS at 01:20

## 2017-07-26 RX ADMIN — Medication 650 MILLIGRAM(S): at 17:04

## 2017-07-26 RX ADMIN — TAMSULOSIN HYDROCHLORIDE 0.4 MILLIGRAM(S): 0.4 CAPSULE ORAL at 21:15

## 2017-07-26 RX ADMIN — Medication 100 MILLIGRAM(S): at 17:04

## 2017-07-26 RX ADMIN — Medication 650 MILLIGRAM(S): at 17:34

## 2017-07-26 RX ADMIN — SENNA PLUS 1 TABLET(S): 8.6 TABLET ORAL at 21:15

## 2017-07-26 RX ADMIN — MONTELUKAST 10 MILLIGRAM(S): 4 TABLET, CHEWABLE ORAL at 21:15

## 2017-07-26 RX ADMIN — SODIUM CHLORIDE 75 MILLILITER(S): 9 INJECTION, SOLUTION INTRAVENOUS at 15:31

## 2017-07-26 RX ADMIN — Medication 100 MILLIGRAM(S): at 21:15

## 2017-07-26 NOTE — PROCEDURE NOTE - ADDITIONAL PROCEDURE DETAILS
normal pacemaker function , lead impedence stable, sensing and pacing threshold normal , not pacemaker dependent , no need to call EP post procedure as patient is not dependent and procedure is below the waist even if cautery is used  [id number]

## 2017-07-26 NOTE — PROGRESS NOTE ADULT - SUBJECTIVE AND OBJECTIVE BOX
Post op Check    Pt seen and examined without complaints. Pain is controlled. Denies SOB/CP/N/V.     Vital Signs Last 24 Hrs  T(C): 36.9 (26 Jul 2017 18:55), Max: 36.9 (26 Jul 2017 05:37)  T(F): 98.5 (26 Jul 2017 18:55), Max: 98.5 (26 Jul 2017 18:55)  HR: 80 (26 Jul 2017 18:55) (60 - 80)  BP: 146/89 (26 Jul 2017 18:55) (122/68 - 160/90)  BP(mean): 108 (26 Jul 2017 15:15) (108 - 108)  RR: 16 (26 Jul 2017 18:55) (16 - 18)  SpO2: 95% (26 Jul 2017 18:55) (92% - 100%)    I&O's Summary    25 Jul 2017 07:01  -  26 Jul 2017 07:00  --------------------------------------------------------  IN: 2950 mL / OUT: 450 mL / NET: 2500 mL    26 Jul 2017 07:01  -  26 Jul 2017 20:53  --------------------------------------------------------  IN: 495 mL / OUT: 650 mL / NET: -155 mL    Physical Exam  Gen: NAD  Pulm: No respiratory distress, no subcostal retractions  CV: RRR, no JVD  Abd: Soft, NT, ND  : uncircumcised, appropriately tender, dressing blood tinged, dorantes draining clear yellow urine       A/P: 89y Male s/p   DVT prophylaxis/OOB  Incentive spirometry  Strict I&O's  Analgesia and antiemetics as needed  Diet-regular   Restart a/c tomorrow   AM labs

## 2017-07-26 NOTE — PROGRESS NOTE ADULT - SUBJECTIVE AND OBJECTIVE BOX
UROLOGY DAILY PROGRESS NOTE:     Subjective: Patient seen and examined at bedside.   No acute events overnight     Objective:  Vital signs  T(F): , Max: 98.4 (07-26-17 @ 05:37)  HR: 61 (07-26-17 @ 05:37)  BP: 151/84 (07-26-17 @ 05:37)  SpO2: 96% (07-26-17 @ 05:37)  Wt(kg): --    Output     I&O's Detail    24 Jul 2017 07:01  -  25 Jul 2017 07:00  --------------------------------------------------------  IN:    IV PiggyBack: 100 mL    Oral Fluid: 1380 mL    sodium chloride 0.45%.: 1800 mL  Total IN: 3280 mL    OUT:    Voided: 1225 mL  Total OUT: 1225 mL    Total NET: 2055 mL      25 Jul 2017 07:01  -  26 Jul 2017 06:43  --------------------------------------------------------  IN:    IV PiggyBack: 350 mL    Oral Fluid: 800 mL    sodium chloride 0.45%.: 1800 mL  Total IN: 2950 mL    OUT:    Voided: 450 mL  Total OUT: 450 mL    Total NET: 2500 mL          Physical Exam:  Gen: NAD  Abd: soft NTND   : b/l testis tender penile prosthesis sticking out of meatus  and erythematous       Labs:                  Urine Cx:

## 2017-07-26 NOTE — PROGRESS NOTE ADULT - SUBJECTIVE AND OBJECTIVE BOX
Subjective: Patient seen and examined. No new events except as noted.     SUBJECTIVE/ROS:    pt seen and examined earlier today  No chest pain, or sob.   MEDICATIONS:  MEDICATIONS  (STANDING):      PHYSICAL EXAM:  T(C): 36.3 (07-26-17 @ 09:26), Max: 36.9 (07-26-17 @ 05:37)  HR: 62 (07-26-17 @ 09:26) (60 - 79)  BP: 157/84 (07-26-17 @ 09:26) (149/82 - 157/84)  RR: 17 (07-26-17 @ 09:26) (16 - 18)  SpO2: 96% (07-26-17 @ 09:26) (92% - 98%)  Wt(kg): --  I&O's Summary    25 Jul 2017 07:01  -  26 Jul 2017 07:00  --------------------------------------------------------  IN: 2950 mL / OUT: 450 mL / NET: 2500 mL    26 Jul 2017 07:01  -  26 Jul 2017 13:49  --------------------------------------------------------  IN: 0 mL / OUT: 0 mL / NET: 0 mL          Appearance: Normal	  HEENT:   Normal oral mucosa, PERRL, EOMI	  Cardiovascular: Normal S1 S2,    Murmur:   Neck: JVP normal  Respiratory: Lungs clear to auscultation  Gastrointestinal:  Soft, Non-tender, + BS	  Skin: normal   Neuro: No gross deficits.   Psychiatry:  Mood & affect appropriate  Ext: No edema        LABS:    CARDIAC MARKERS:                  proBNP:   Lipid Profile:   HgA1c:   TSH:           TELEMETRY: 	    ECG:  	  RADIOLOGY:   DIAGNOSTIC TESTING:  Echocardiogram:  Catheterization:  Stress Test:    OTHER:

## 2017-07-27 LAB
ANION GAP SERPL CALC-SCNC: 10 MMOL/L — SIGNIFICANT CHANGE UP (ref 5–17)
APTT BLD: 29.3 SEC — SIGNIFICANT CHANGE UP (ref 27.5–37.4)
BUN SERPL-MCNC: 10 MG/DL — SIGNIFICANT CHANGE UP (ref 7–23)
CALCIUM SERPL-MCNC: 8.5 MG/DL — SIGNIFICANT CHANGE UP (ref 8.4–10.5)
CHLORIDE SERPL-SCNC: 104 MMOL/L — SIGNIFICANT CHANGE UP (ref 96–108)
CO2 SERPL-SCNC: 26 MMOL/L — SIGNIFICANT CHANGE UP (ref 22–31)
CREAT SERPL-MCNC: 1.09 MG/DL — SIGNIFICANT CHANGE UP (ref 0.5–1.3)
GLUCOSE SERPL-MCNC: 112 MG/DL — HIGH (ref 70–99)
HCT VFR BLD CALC: 33.9 % — LOW (ref 39–50)
HGB BLD-MCNC: 10.9 G/DL — LOW (ref 13–17)
INR BLD: 1.31 RATIO — HIGH (ref 0.88–1.16)
MCHC RBC-ENTMCNC: 29.8 PG — SIGNIFICANT CHANGE UP (ref 27–34)
MCHC RBC-ENTMCNC: 32 GM/DL — SIGNIFICANT CHANGE UP (ref 32–36)
MCV RBC AUTO: 93.1 FL — SIGNIFICANT CHANGE UP (ref 80–100)
PLATELET # BLD AUTO: 146 K/UL — LOW (ref 150–400)
POTASSIUM SERPL-MCNC: 3.5 MMOL/L — SIGNIFICANT CHANGE UP (ref 3.5–5.3)
POTASSIUM SERPL-SCNC: 3.5 MMOL/L — SIGNIFICANT CHANGE UP (ref 3.5–5.3)
PROTHROM AB SERPL-ACNC: 14.2 SEC — HIGH (ref 9.8–12.7)
RBC # BLD: 3.64 M/UL — LOW (ref 4.2–5.8)
RBC # FLD: 14.2 % — SIGNIFICANT CHANGE UP (ref 10.3–14.5)
SODIUM SERPL-SCNC: 140 MMOL/L — SIGNIFICANT CHANGE UP (ref 135–145)
VANCOMYCIN TROUGH SERPL-MCNC: 17.6 UG/ML — SIGNIFICANT CHANGE UP (ref 10–20)
WBC # BLD: 7.6 K/UL — SIGNIFICANT CHANGE UP (ref 3.8–10.5)
WBC # FLD AUTO: 7.6 K/UL — SIGNIFICANT CHANGE UP (ref 3.8–10.5)

## 2017-07-27 RX ORDER — ENOXAPARIN SODIUM 100 MG/ML
80 INJECTION SUBCUTANEOUS EVERY 12 HOURS
Qty: 0 | Refills: 0 | Status: DISCONTINUED | OUTPATIENT
Start: 2017-07-27 | End: 2017-07-28

## 2017-07-27 RX ORDER — HEPARIN SODIUM 5000 [USP'U]/ML
5000 INJECTION INTRAVENOUS; SUBCUTANEOUS EVERY 8 HOURS
Qty: 0 | Refills: 0 | Status: DISCONTINUED | OUTPATIENT
Start: 2017-07-27 | End: 2017-07-27

## 2017-07-27 RX ORDER — LIDOCAINE 4 G/100G
1 CREAM TOPICAL
Qty: 0 | Refills: 0 | Status: DISCONTINUED | OUTPATIENT
Start: 2017-07-27 | End: 2017-07-28

## 2017-07-27 RX ADMIN — MONTELUKAST 10 MILLIGRAM(S): 4 TABLET, CHEWABLE ORAL at 21:55

## 2017-07-27 RX ADMIN — AMIODARONE HYDROCHLORIDE 100 MILLIGRAM(S): 400 TABLET ORAL at 06:11

## 2017-07-27 RX ADMIN — Medication 81 MILLIGRAM(S): at 11:30

## 2017-07-27 RX ADMIN — Medication 100 MILLIGRAM(S): at 06:11

## 2017-07-27 RX ADMIN — PANTOPRAZOLE SODIUM 40 MILLIGRAM(S): 20 TABLET, DELAYED RELEASE ORAL at 06:11

## 2017-07-27 RX ADMIN — MAGNESIUM OXIDE 400 MG ORAL TABLET 400 MILLIGRAM(S): 241.3 TABLET ORAL at 11:30

## 2017-07-27 RX ADMIN — ENOXAPARIN SODIUM 80 MILLIGRAM(S): 100 INJECTION SUBCUTANEOUS at 18:37

## 2017-07-27 RX ADMIN — SENNA PLUS 1 TABLET(S): 8.6 TABLET ORAL at 21:54

## 2017-07-27 RX ADMIN — Medication 250 MILLIGRAM(S): at 19:39

## 2017-07-27 RX ADMIN — Medication 325 MILLIGRAM(S): at 11:30

## 2017-07-27 RX ADMIN — LISINOPRIL 10 MILLIGRAM(S): 2.5 TABLET ORAL at 06:11

## 2017-07-27 RX ADMIN — ATORVASTATIN CALCIUM 80 MILLIGRAM(S): 80 TABLET, FILM COATED ORAL at 21:54

## 2017-07-27 RX ADMIN — TAMSULOSIN HYDROCHLORIDE 0.4 MILLIGRAM(S): 0.4 CAPSULE ORAL at 21:55

## 2017-07-27 RX ADMIN — Medication 100 MILLIGRAM(S): at 21:55

## 2017-07-27 RX ADMIN — Medication 100 MILLIGRAM(S): at 18:37

## 2017-07-27 RX ADMIN — Medication 100 MILLIGRAM(S): at 11:30

## 2017-07-27 RX ADMIN — Medication 250 MILLIGRAM(S): at 06:11

## 2017-07-27 NOTE — PHYSICAL THERAPY INITIAL EVALUATION ADULT - CRITERIA FOR SKILLED THERAPEUTIC INTERVENTIONS
impairments found/home w/ home PT for progressive ambulation training, transfers, bed mobs, and safety assessment pt has rolling walker and walking stick/anticipated discharge recommendation

## 2017-07-27 NOTE — PROGRESS NOTE ADULT - SUBJECTIVE AND OBJECTIVE BOX
UROLOGY DAILY PROGRESS NOTE:     Subjective: Patient seen and examined at bedside. No overnight events.       Objective:  Vital signs  T(F): , Max: 98.5 (07-26-17 @ 18:55)  HR: 69 (07-27-17 @ 05:59)  BP: 129/74 (07-27-17 @ 05:59)  SpO2: 97% (07-27-17 @ 05:59)  Wt(kg): --    I&O's Summary    25 Jul 2017 07:01  -  26 Jul 2017 07:00  --------------------------------------------------------  IN: 2950 mL / OUT: 450 mL / NET: 2500 mL    26 Jul 2017 07:01  -  27 Jul 2017 06:13  --------------------------------------------------------  IN: 675 mL / OUT: 1150 mL / NET: -475 mL        Gen: NAD, A&O x 3  Pulm: No respiratory distress, no subcostal retractions  CV: RRR, no JVD  Abd: Soft, NT, ND  : circumcised, 2 penrose drains intact, draining, +[employer] draining clear yellow, minimal swelling, dressing changed     Labs:                Urine Cx:

## 2017-07-27 NOTE — PHYSICAL THERAPY INITIAL EVALUATION ADULT - IMPAIRMENTS CONTRIBUTING TO GAIT DEVIATIONS, PT EVAL
wide based and cues to remain in walker base/impaired motor control/decreased strength/impaired postural control

## 2017-07-27 NOTE — PHYSICAL THERAPY INITIAL EVALUATION ADULT - GENERAL OBSERVATIONS, REHAB EVAL
Rec'd in bed + dorantes, + Iv lock and dressing  on penis w/ underwear , sister and brother in law present, NAD< agreeable to PT

## 2017-07-27 NOTE — PHYSICAL THERAPY INITIAL EVALUATION ADULT - PERTINENT HX OF CURRENT PROBLEM, REHAB EVAL
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  He presents to the office with an erosion of the corporal cylinder noted at the urethral meatus and through the penile glans.

## 2017-07-27 NOTE — PHYSICAL THERAPY INITIAL EVALUATION ADULT - ADDITIONAL COMMENTS
lives in private home w/ 4 steps to enter w/ handrails, flight to bedroom, no glasses, [place] no hearing aids, R handed

## 2017-07-27 NOTE — PROGRESS NOTE ADULT - SUBJECTIVE AND OBJECTIVE BOX
Subjective: Patient seen and examined. No new events except as noted.     SUBJECTIVE/ROS:  No chest pain, or sob.     MEDICATIONS:  MEDICATIONS  (STANDING):  aspirin enteric coated 81 milliGRAM(s) Oral daily  tamsulosin 0.4 milliGRAM(s) Oral at bedtime  amiodarone    Tablet 100 milliGRAM(s) Oral daily  atorvastatin 80 milliGRAM(s) Oral at bedtime  metoprolol 100 milliGRAM(s) Oral two times a day  ferrous    sulfate 325 milliGRAM(s) Oral daily  docusate sodium 100 milliGRAM(s) Oral three times a day  senna 1 Tablet(s) Oral at bedtime  magnesium oxide 400 milliGRAM(s) Oral daily  montelukast 10 milliGRAM(s) Oral at bedtime  pantoprazole    Tablet 40 milliGRAM(s) Oral before breakfast  lisinopril 10 milliGRAM(s) Oral daily  vancomycin  IVPB 1000 milliGRAM(s) IV Intermittent every 12 hours  lactated ringers. 1000 milliLiter(s) (75 mL/Hr) IV Continuous <Continuous>      PHYSICAL EXAM:  T(C): 36.8 (07-27-17 @ 05:59), Max: 36.9 (07-26-17 @ 16:55)  HR: 69 (07-27-17 @ 05:59) (60 - 80)  BP: 129/74 (07-27-17 @ 05:59) (115/73 - 160/90)  RR: 17 (07-27-17 @ 05:59) (16 - 18)  SpO2: 97% (07-27-17 @ 05:59) (94% - 100%)  Wt(kg): --  I&O's Summary    25 Jul 2017 07:01  -  26 Jul 2017 07:00  --------------------------------------------------------  IN: 2950 mL / OUT: 450 mL / NET: 2500 mL    26 Jul 2017 07:01  -  27 Jul 2017 06:45  --------------------------------------------------------  IN: 1575 mL / OUT: 1550 mL / NET: 25 mL          Appearance: Normal	  HEENT:   Normal oral mucosa, PERRL, EOMI	  Cardiovascular: Normal S1 S2,    Murmur:   Neck: JVP normal  Respiratory: Lungs clear to auscultation  Gastrointestinal:  Soft, Non-tender, + BS	  Skin: normal   Neuro: No gross deficits.   Psychiatry:  Mood & affect appropriate  Ext: No edema        LABS:    CARDIAC MARKERS:                  proBNP:   Lipid Profile:   HgA1c:   TSH:           TELEMETRY: 	    ECG:  	  RADIOLOGY:   DIAGNOSTIC TESTING:  Echocardiogram:  Catheterization:  Stress Test:    OTHER:

## 2017-07-28 ENCOUNTER — TRANSCRIPTION ENCOUNTER (OUTPATIENT)
Age: 82
End: 2017-07-28

## 2017-07-28 VITALS — TEMPERATURE: 98 F

## 2017-07-28 PROCEDURE — 80202 ASSAY OF VANCOMYCIN: CPT

## 2017-07-28 PROCEDURE — 80048 BASIC METABOLIC PNL TOTAL CA: CPT

## 2017-07-28 PROCEDURE — 86901 BLOOD TYPING SEROLOGIC RH(D): CPT

## 2017-07-28 PROCEDURE — 96374 THER/PROPH/DIAG INJ IV PUSH: CPT

## 2017-07-28 PROCEDURE — 97162 PT EVAL MOD COMPLEX 30 MIN: CPT

## 2017-07-28 PROCEDURE — 72192 CT PELVIS W/O DYE: CPT

## 2017-07-28 PROCEDURE — 85027 COMPLETE CBC AUTOMATED: CPT

## 2017-07-28 PROCEDURE — 71046 X-RAY EXAM CHEST 2 VIEWS: CPT

## 2017-07-28 PROCEDURE — 71045 X-RAY EXAM CHEST 1 VIEW: CPT

## 2017-07-28 PROCEDURE — 81001 URINALYSIS AUTO W/SCOPE: CPT

## 2017-07-28 PROCEDURE — 85610 PROTHROMBIN TIME: CPT

## 2017-07-28 PROCEDURE — 85730 THROMBOPLASTIN TIME PARTIAL: CPT

## 2017-07-28 PROCEDURE — 93005 ELECTROCARDIOGRAM TRACING: CPT

## 2017-07-28 PROCEDURE — 86900 BLOOD TYPING SEROLOGIC ABO: CPT

## 2017-07-28 PROCEDURE — 87086 URINE CULTURE/COLONY COUNT: CPT

## 2017-07-28 PROCEDURE — 80053 COMPREHEN METABOLIC PANEL: CPT

## 2017-07-28 PROCEDURE — 86850 RBC ANTIBODY SCREEN: CPT

## 2017-07-28 PROCEDURE — 87070 CULTURE OTHR SPECIMN AEROBIC: CPT

## 2017-07-28 PROCEDURE — 99285 EMERGENCY DEPT VISIT HI MDM: CPT | Mod: 25

## 2017-07-28 RX ORDER — ACETAMINOPHEN 500 MG
2 TABLET ORAL
Qty: 0 | Refills: 0 | COMMUNITY
Start: 2017-07-28

## 2017-07-28 RX ORDER — LISINOPRIL 2.5 MG/1
1 TABLET ORAL
Qty: 0 | Refills: 0 | COMMUNITY
Start: 2017-07-28

## 2017-07-28 RX ADMIN — LISINOPRIL 10 MILLIGRAM(S): 2.5 TABLET ORAL at 05:37

## 2017-07-28 RX ADMIN — Medication 100 MILLIGRAM(S): at 05:36

## 2017-07-28 RX ADMIN — Medication 325 MILLIGRAM(S): at 12:13

## 2017-07-28 RX ADMIN — ENOXAPARIN SODIUM 80 MILLIGRAM(S): 100 INJECTION SUBCUTANEOUS at 05:40

## 2017-07-28 RX ADMIN — ENOXAPARIN SODIUM 80 MILLIGRAM(S): 100 INJECTION SUBCUTANEOUS at 18:11

## 2017-07-28 RX ADMIN — Medication 81 MILLIGRAM(S): at 12:13

## 2017-07-28 RX ADMIN — MAGNESIUM OXIDE 400 MG ORAL TABLET 400 MILLIGRAM(S): 241.3 TABLET ORAL at 12:13

## 2017-07-28 RX ADMIN — Medication 100 MILLIGRAM(S): at 18:11

## 2017-07-28 RX ADMIN — PANTOPRAZOLE SODIUM 40 MILLIGRAM(S): 20 TABLET, DELAYED RELEASE ORAL at 05:36

## 2017-07-28 RX ADMIN — AMIODARONE HYDROCHLORIDE 100 MILLIGRAM(S): 400 TABLET ORAL at 05:36

## 2017-07-28 RX ADMIN — Medication 250 MILLIGRAM(S): at 08:29

## 2017-07-28 NOTE — DISCHARGE NOTE ADULT - MEDICATION SUMMARY - MEDICATIONS TO TAKE
I will START or STAY ON the medications listed below when I get home from the hospital:    aspirin 81 mg oral tablet  -- 1 tab(s) by mouth every other day  -- Indication: For Home medication    Tylenol 325 mg oral tablet  -- 2 tab(s) by mouth every 6 hours, As needed, Moderate Pain (4 - 6)  -- Indication: For post operative pain control    Zestril 10 mg oral tablet  -- 1 tab(s) by mouth once a day  -- Indication: For Home medication    Flomax 0.4 mg oral capsule  -- 1 cap(s) by mouth once a day  -- Indication: For Home medication    amiodarone 100 mg oral tablet  -- 1 tab(s) by mouth once a day  -- Indication: For Home medication    apixaban 5 mg oral tablet  -- 1 tab(s) by mouth 2 times a day  -- Indication: For atrial fibrillation    atorvastatin 80 mg oral tablet  -- 1 tab(s) by mouth once a day (at bedtime)  -- Indication: For Home medication    metoprolol tartrate 100 mg oral tablet  -- 1 tab(s) by mouth 2 times a day  -- Indication: For Home medication    donepezil 10 mg oral tablet  -- 1 tab(s) by mouth once a day (at bedtime)  -- Indication: For Home medication    ferrous sulfate 325 mg (65 mg elemental iron) oral tablet  -- 1 tab(s) by mouth 2 times a day  -- Indication: For Home medication    docusate sodium 100 mg oral capsule  -- 1 cap(s) by mouth 3 times a day  -- Indication: For Home medication    senna oral tablet  -- 1 tab(s) by mouth once a day (at bedtime)  -- Indication: For Home medication    montelukast 10 mg oral tablet  -- 1 tab(s) by mouth once a day (in the evening)  -- Indication: For Home medication    magnesium oxide 400 mg (241.3 mg elemental magnesium) oral tablet  -- 1 tab(s) by mouth once a day  -- Indication: For Home medication    Augmentin 875 mg-125 mg oral tablet  -- 1 tab(s) by mouth every 12 hours  -- Finish all this medication unless otherwise directed by prescriber.  Take with food or milk.    -- Indication: For Post operative antibiotics    pantoprazole 40 mg oral delayed release tablet  -- 1 tab(s) by mouth once a day  -- Indication: For Home medication

## 2017-07-28 NOTE — PROGRESS NOTE ADULT - SUBJECTIVE AND OBJECTIVE BOX
Subjective    Patient seen and examined. No overnight events. No fevers. Tolerating diet. Pain controlled.     Objective    Vital signs  T(F): , Max: 99.1 (07-27-17 @ 21:29)  HR: 70 (07-28-17 @ 05:32)  BP: 136/80 (07-28-17 @ 05:32)  SpO2: 98% (07-28-17 @ 05:32)  Wt(kg): --    Output     07-26 @ 07:01  -  07-27 @ 07:00  --------------------------------------------------------  IN: 1575 mL / OUT: 1550 mL / NET: 25 mL    07-27 @ 07:01  -  07-28 @ 05:41  --------------------------------------------------------  IN: 1950 mL / OUT: 450 mL / NET: 1500 mL        General: NAD  Abdomen: soft/non-tender/non-distended  : dorantes in place, clear urine. Moderate penile swelling, no purulence. Scrotal incision c/d/i, penrose in place. ABD with moderate seropurulent drainage.    Labs      07-27 @ 18:57    WBC 7.6   / Hct 33.9  / SCr 1.09       OR wound culture: no growth

## 2017-07-28 NOTE — DISCHARGE NOTE ADULT - INSTRUCTIONS
Regular diet call pmd if you have fever greater than 100 degrees, if you have pain unrelieved by medication ,  if your incision site becomes red, swollen, warm to touch,  if you see any oozing from site, or if you see fresh red blood.  call if you have difficulty urinating or if you have nausea or vomiting

## 2017-07-28 NOTE — DISCHARGE NOTE ADULT - CARE PROVIDER_API CALL
Jimmy Rdz (MD), Urology  [address] [place] [id number]  Phone: [phone]  Fax: [phone]

## 2017-07-28 NOTE — PROGRESS NOTE ADULT - SUBJECTIVE AND OBJECTIVE BOX
Subjective: Patient seen and examined. No new events except as noted.     SUBJECTIVE/ROS:  No chest pain, or sob.     MEDICATIONS:  MEDICATIONS  (STANDING):  aspirin enteric coated 81 milliGRAM(s) Oral daily  tamsulosin 0.4 milliGRAM(s) Oral at bedtime  amiodarone    Tablet 100 milliGRAM(s) Oral daily  atorvastatin 80 milliGRAM(s) Oral at bedtime  metoprolol 100 milliGRAM(s) Oral two times a day  ferrous    sulfate 325 milliGRAM(s) Oral daily  docusate sodium 100 milliGRAM(s) Oral three times a day  senna 1 Tablet(s) Oral at bedtime  magnesium oxide 400 milliGRAM(s) Oral daily  montelukast 10 milliGRAM(s) Oral at bedtime  pantoprazole    Tablet 40 milliGRAM(s) Oral before breakfast  lisinopril 10 milliGRAM(s) Oral daily  vancomycin  IVPB 1000 milliGRAM(s) IV Intermittent every 12 hours  lactated ringers. 1000 milliLiter(s) (75 mL/Hr) IV Continuous <Continuous>  enoxaparin Injectable 80 milliGRAM(s) SubCutaneous every 12 hours      PHYSICAL EXAM:  T(C): 36.6 (07-28-17 @ 05:32), Max: 37.3 (07-27-17 @ 21:29)  HR: 70 (07-28-17 @ 05:32) (67 - 90)  BP: 136/80 (07-28-17 @ 05:32) (117/74 - 137/78)  RR: 18 (07-28-17 @ 05:32) (17 - 18)  SpO2: 98% (07-28-17 @ 05:32) (95% - 98%)  Wt(kg): --  I&O's Summary    26 Jul 2017 07:01  -  27 Jul 2017 07:00  --------------------------------------------------------  IN: 1575 mL / OUT: 1550 mL / NET: 25 mL    27 Jul 2017 07:01  -  28 Jul 2017 06:52  --------------------------------------------------------  IN: 2850 mL / OUT: 450 mL / NET: 2400 mL          Appearance: Normal	  HEENT:   Normal oral mucosa, PERRL, EOMI	  Cardiovascular: Normal S1 S2,    Murmur:   Neck: JVP normal  Respiratory: Lungs clear to auscultation  Gastrointestinal:  Soft, Non-tender, + BS	  Skin: normal   Neuro: No gross deficits.   Psychiatry:  Mood & affect appropriate  Ext: No edema        LABS:    CARDIAC MARKERS:                                10.9   7.6   )-----------( 146      ( 27 Jul 2017 18:57 )             33.9     07-27    140  |  104  |  10  ----------------------------<  112<H>  3.5   |  26  |  1.09    Ca    8.5      27 Jul 2017 18:57      proBNP:   Lipid Profile:   HgA1c:   TSH:           TELEMETRY: 	 ECG:  	  RADIOLOGY:   DIAGNOSTIC TESTING:  Echocardiogram:  Catheterization:  Stress Test:    OTHER:

## 2017-07-28 NOTE — DISCHARGE NOTE ADULT - CARE PLAN
Principal Discharge DX:	Erosion of penile prosthesis  Goal:	s/p removal of prosthesis  Instructions for follow-up, activity and diet:	Regular diet.  Please follow up with Dr Rdz in office [phone].  Call for appointment. Principal Discharge DX:	Erosion of penile prosthesis  Goal:	s/p removal of prosthesis  Instructions for follow-up, activity and diet:	Regular diet.  Please follow up with Dr Rdz in office [phone].  Call for appointment. Principal Discharge DX:	Erosion of penile prosthesis  Goal:	s/p removal of prosthesis  Instructions for follow-up, activity and diet:	Regular diet.  Please follow up with Dr Rdz in office [phone].  Call for appointment. Principal Discharge DX:	Erosion of penile prosthesis  Goal:	s/p removal of prosthesis  Instructions for follow-up, activity and diet:	Regular diet.  Please follow up with Dr Rdz in office [phone].  Call for appointment. Principal Discharge DX:	Erosion of penile prosthesis  Goal:	s/p removal of prosthesis  Instructions for follow-up, activity and diet:	Regular diet.  Please follow up with Dr Rdz in office [phone].  Call for appointment. Principal Discharge DX:	Erosion of penile prosthesis  Goal:	s/p removal of prosthesis  Instructions for follow-up, activity and diet:	Regular diet.  Please follow up with Dr Rdz in office [phone].  Call for appointment.    Also, please start taking your eliquis when you get get home.    Home physical therapy has been set up and referred to you. Principal Discharge DX:	Erosion of penile prosthesis  Goal:	s/p removal of prosthesis  Instructions for follow-up, activity and diet:	Regular diet.  Please follow up with Dr Rdz in office [phone].  Call for appointment.    Also, please start taking your eliquis when you get get home.    Home physical therapy has been set up and referred to you.

## 2017-07-28 NOTE — DISCHARGE NOTE ADULT - CONDITIONS AT DISCHARGE
pt A&O x's 4, vitals stable  pt ambulating with walker and standby assist,   tolerating diet,    dorantes draining adequate urine.   ivl dc'd  no s/s of infeciton or infiltration.   pt being dc'd  with dorantes catheter,  family and pt's teaching completed    supplies given     no c/o pain    home care and pt  at home ordered

## 2017-07-28 NOTE — DISCHARGE NOTE ADULT - PATIENT PORTAL LINK FT
“You can access the [facility] Patient Portal, offered by [facility], by registering with the following website: [email or web address]”

## 2017-07-28 NOTE — DISCHARGE NOTE ADULT - NSFTFSERV1RD_GEN_ALL_CORE
teaching and training/observation and assessment rehabilitation services/teaching and training/observation and assessment

## 2017-07-28 NOTE — PROGRESS NOTE ADULT - ASSESSMENT
PRE OP  Based on current ACC/AHA guidelines, patient history and physical exam, the patient is considered to have elevated risk but optimized for OR and no objection to proceed.    AFIB  paced , HR controlled. eliquis on hold from yesterday The calculated [id number] score is 6 , on lovenox for a/c till surgery then change to eliquis after OR once deemed safe.  dc Aricept given high risk of qt prolongation with amio.    HTN  stable  cont current meds    Chronic mild systolic chf  stable  cont bb/ace
89 yo male with eroded penile prosthesis   - OR wednesday for explant  - hold eliquis, on lovenox per cardio  - continue vanc/zosyn  - f/u ucx   - vanc trough before evening dose today
89 y.o. M s/p replacement of inflatable penile prosthesis   - f/u am cbc, bmp, coags  - lovenox today  -f/u cards  - f/u OR cultures
89M with eroded  penile prosthesis  --continue vanco/zosyn, f/up urine culture  -- hold eliquis  -- cardiology consult   -- OR planning for Wednesday for explant of prosthesis
89M with eroded  penile prosthesis s/p explant.  --continue vanco/zosyn while in house  -- continue T lovenox, switch to eliquis upon discharge  -- keep dorantes  -- f/up cards  -- dispo planning for later today with 1 week Augmentin, likely removal of penrose prior to discharge home
AFIB  HR stable  resume eliquis    HTN  stable  cont current meds    Chronic mild systolic chf  stable  cont bb/ace
AFIB  HR stable  resume eliquis    HTN  stable  cont current meds    Chronic mild systolic chf  stable  cont bb/ace
PRE OP  Based on current ACC/AHA guidelines, patient history and physical exam, the patient is considered to have elevated risk but optimized for OR and no objection to proceed.    AFIB  paced , HR controlled. eliquis on hold from yesterday The calculated [id number] score is 6 , on lovenox for a/c till surgery then change to eliquis after OR once deemed safe.  dc Aricept given high risk of qt prolongation with amio.    HTN  stable  cont current meds    Chronic mild systolic chf  stable  cont bb/ace
eroded penile prosthesis for OR today removal of prosthesis

## 2017-07-28 NOTE — DISCHARGE NOTE ADULT - PLAN OF CARE
s/p removal of prosthesis Regular diet.  Please follow up with Dr Rdz in office [phone].  Call for appointment. Regular diet.  Please follow up with Dr Rdz in office [phone].  Call for appointment.    Also, please start taking your eliquis when you get get home.    Home physical therapy has been set up and referred to you.

## 2017-07-28 NOTE — PROGRESS NOTE ADULT - PROVIDER SPECIALTY LIST ADULT
Urology
Cardiology

## 2017-07-29 ENCOUNTER — MESSAGE (OUTPATIENT)
Age: 82
End: 2017-07-29

## 2017-07-31 ENCOUNTER — INPATIENT (INPATIENT)
Facility: HOSPITAL | Age: 82
LOS: 3 days | Discharge: HOME | DRG: 920 | End: 2017-08-04
Attending: UROLOGY | Admitting: UROLOGY
Payer: COMMERCIAL

## 2017-07-31 VITALS
HEART RATE: 90 BPM | OXYGEN SATURATION: 97 % | SYSTOLIC BLOOD PRESSURE: 121 MMHG | DIASTOLIC BLOOD PRESSURE: 75 MMHG | RESPIRATION RATE: 17 BRPM

## 2017-07-31 DIAGNOSIS — D64.9 ANEMIA, UNSPECIFIED: ICD-10-CM

## 2017-07-31 DIAGNOSIS — Z98.89 OTHER SPECIFIED POSTPROCEDURAL STATES: Chronic | ICD-10-CM

## 2017-07-31 DIAGNOSIS — Z95.0 PRESENCE OF CARDIAC PACEMAKER: Chronic | ICD-10-CM

## 2017-07-31 LAB
APPEARANCE UR: CLEAR — SIGNIFICANT CHANGE UP
APTT BLD: 34.6 SEC — SIGNIFICANT CHANGE UP (ref 27.5–37.4)
BACTERIA # UR AUTO: ABNORMAL /HPF
BASOPHILS # BLD AUTO: 0 K/UL — SIGNIFICANT CHANGE UP (ref 0–0.2)
BASOPHILS NFR BLD AUTO: 0.7 % — SIGNIFICANT CHANGE UP (ref 0–2)
BILIRUB UR-MCNC: NEGATIVE — SIGNIFICANT CHANGE UP
BLD GP AB SCN SERPL QL: NEGATIVE — SIGNIFICANT CHANGE UP
COLOR SPEC: YELLOW — SIGNIFICANT CHANGE UP
CULTURE RESULTS: SIGNIFICANT CHANGE UP
DIFF PNL FLD: ABNORMAL
EOSINOPHIL # BLD AUTO: 0.1 K/UL — SIGNIFICANT CHANGE UP (ref 0–0.5)
EOSINOPHIL NFR BLD AUTO: 2 % — SIGNIFICANT CHANGE UP (ref 0–6)
EPI CELLS # UR: SIGNIFICANT CHANGE UP /HPF
GLUCOSE UR QL: NEGATIVE — SIGNIFICANT CHANGE UP
HCT VFR BLD CALC: 25.8 % — LOW (ref 39–50)
HGB BLD-MCNC: 8.4 G/DL — LOW (ref 13–17)
INR BLD: 1.84 RATIO — HIGH (ref 0.88–1.16)
KETONES UR-MCNC: ABNORMAL
LEUKOCYTE ESTERASE UR-ACNC: ABNORMAL
LYMPHOCYTES # BLD AUTO: 1 K/UL — SIGNIFICANT CHANGE UP (ref 1–3.3)
LYMPHOCYTES # BLD AUTO: 17.3 % — SIGNIFICANT CHANGE UP (ref 13–44)
MCHC RBC-ENTMCNC: 30.5 PG — SIGNIFICANT CHANGE UP (ref 27–34)
MCHC RBC-ENTMCNC: 32.7 GM/DL — SIGNIFICANT CHANGE UP (ref 32–36)
MCV RBC AUTO: 93.2 FL — SIGNIFICANT CHANGE UP (ref 80–100)
MONOCYTES # BLD AUTO: 0.6 K/UL — SIGNIFICANT CHANGE UP (ref 0–0.9)
MONOCYTES NFR BLD AUTO: 9.9 % — SIGNIFICANT CHANGE UP (ref 2–14)
NEUTROPHILS # BLD AUTO: 4.2 K/UL — SIGNIFICANT CHANGE UP (ref 1.8–7.4)
NEUTROPHILS NFR BLD AUTO: 70.1 % — SIGNIFICANT CHANGE UP (ref 43–77)
NITRITE UR-MCNC: NEGATIVE — SIGNIFICANT CHANGE UP
PH UR: 6 — SIGNIFICANT CHANGE UP (ref 5–8)
PLATELET # BLD AUTO: 144 K/UL — LOW (ref 150–400)
PROT UR-MCNC: SIGNIFICANT CHANGE UP
PROTHROM AB SERPL-ACNC: 20.1 SEC — HIGH (ref 9.8–12.7)
RBC # BLD: 2.77 M/UL — LOW (ref 4.2–5.8)
RBC # FLD: 14.2 % — SIGNIFICANT CHANGE UP (ref 10.3–14.5)
RBC CASTS # UR COMP ASSIST: ABNORMAL /HPF (ref 0–2)
RH IG SCN BLD-IMP: POSITIVE — SIGNIFICANT CHANGE UP
SP GR SPEC: 1.02 — SIGNIFICANT CHANGE UP (ref 1.01–1.02)
SPECIMEN SOURCE: SIGNIFICANT CHANGE UP
UROBILINOGEN FLD QL: NEGATIVE — SIGNIFICANT CHANGE UP
WBC # BLD: 6 K/UL — SIGNIFICANT CHANGE UP (ref 3.8–10.5)
WBC # FLD AUTO: 6 K/UL — SIGNIFICANT CHANGE UP (ref 3.8–10.5)
WBC UR QL: SIGNIFICANT CHANGE UP /HPF (ref 0–5)

## 2017-07-31 PROCEDURE — 93975 VASCULAR STUDY: CPT | Mod: 26

## 2017-07-31 PROCEDURE — 99285 EMERGENCY DEPT VISIT HI MDM: CPT

## 2017-07-31 PROCEDURE — 76870 US EXAM SCROTUM: CPT | Mod: 26

## 2017-07-31 RX ORDER — SENNA PLUS 8.6 MG/1
1 TABLET ORAL AT BEDTIME
Qty: 0 | Refills: 0 | Status: DISCONTINUED | OUTPATIENT
Start: 2017-07-31 | End: 2017-08-04

## 2017-07-31 RX ORDER — MONTELUKAST 4 MG/1
10 TABLET, CHEWABLE ORAL DAILY
Qty: 0 | Refills: 0 | Status: DISCONTINUED | OUTPATIENT
Start: 2017-07-31 | End: 2017-08-04

## 2017-07-31 RX ORDER — METOPROLOL TARTRATE 50 MG
100 TABLET ORAL
Qty: 0 | Refills: 0 | Status: DISCONTINUED | OUTPATIENT
Start: 2017-07-31 | End: 2017-08-04

## 2017-07-31 RX ORDER — DOCUSATE SODIUM 100 MG
100 CAPSULE ORAL THREE TIMES A DAY
Qty: 0 | Refills: 0 | Status: DISCONTINUED | OUTPATIENT
Start: 2017-07-31 | End: 2017-08-04

## 2017-07-31 RX ORDER — HEPARIN SODIUM 5000 [USP'U]/ML
5000 INJECTION INTRAVENOUS; SUBCUTANEOUS EVERY 8 HOURS
Qty: 0 | Refills: 0 | Status: DISCONTINUED | OUTPATIENT
Start: 2017-07-31 | End: 2017-08-04

## 2017-07-31 RX ORDER — TAMSULOSIN HYDROCHLORIDE 0.4 MG/1
0.4 CAPSULE ORAL AT BEDTIME
Qty: 0 | Refills: 0 | Status: DISCONTINUED | OUTPATIENT
Start: 2017-07-31 | End: 2017-08-04

## 2017-07-31 RX ORDER — ATORVASTATIN CALCIUM 80 MG/1
80 TABLET, FILM COATED ORAL AT BEDTIME
Qty: 0 | Refills: 0 | Status: DISCONTINUED | OUTPATIENT
Start: 2017-07-31 | End: 2017-08-04

## 2017-07-31 RX ORDER — DONEPEZIL HYDROCHLORIDE 10 MG/1
10 TABLET, FILM COATED ORAL AT BEDTIME
Qty: 0 | Refills: 0 | Status: DISCONTINUED | OUTPATIENT
Start: 2017-07-31 | End: 2017-08-01

## 2017-07-31 RX ORDER — ASPIRIN/CALCIUM CARB/MAGNESIUM 324 MG
81 TABLET ORAL DAILY
Qty: 0 | Refills: 0 | Status: DISCONTINUED | OUTPATIENT
Start: 2017-07-31 | End: 2017-08-04

## 2017-07-31 RX ORDER — SODIUM CHLORIDE 9 MG/ML
1000 INJECTION, SOLUTION INTRAVENOUS
Qty: 0 | Refills: 0 | Status: DISCONTINUED | OUTPATIENT
Start: 2017-07-31 | End: 2017-08-04

## 2017-07-31 RX ORDER — MAGNESIUM OXIDE 400 MG ORAL TABLET 241.3 MG
400 TABLET ORAL DAILY
Qty: 0 | Refills: 0 | Status: DISCONTINUED | OUTPATIENT
Start: 2017-07-31 | End: 2017-08-04

## 2017-07-31 RX ORDER — FUROSEMIDE 40 MG
10 TABLET ORAL ONCE
Qty: 0 | Refills: 0 | Status: COMPLETED | OUTPATIENT
Start: 2017-07-31 | End: 2017-08-01

## 2017-07-31 RX ORDER — LISINOPRIL 2.5 MG/1
10 TABLET ORAL DAILY
Qty: 0 | Refills: 0 | Status: DISCONTINUED | OUTPATIENT
Start: 2017-07-31 | End: 2017-08-04

## 2017-07-31 RX ORDER — AMIODARONE HYDROCHLORIDE 400 MG/1
100 TABLET ORAL DAILY
Qty: 0 | Refills: 0 | Status: DISCONTINUED | OUTPATIENT
Start: 2017-07-31 | End: 2017-08-04

## 2017-07-31 RX ORDER — PANTOPRAZOLE SODIUM 20 MG/1
40 TABLET, DELAYED RELEASE ORAL
Qty: 0 | Refills: 0 | Status: DISCONTINUED | OUTPATIENT
Start: 2017-07-31 | End: 2017-08-04

## 2017-07-31 RX ORDER — FERROUS SULFATE 325(65) MG
325 TABLET ORAL
Qty: 0 | Refills: 0 | Status: DISCONTINUED | OUTPATIENT
Start: 2017-07-31 | End: 2017-08-04

## 2017-07-31 RX ADMIN — SENNA PLUS 1 TABLET(S): 8.6 TABLET ORAL at 21:54

## 2017-07-31 RX ADMIN — HEPARIN SODIUM 5000 UNIT(S): 5000 INJECTION INTRAVENOUS; SUBCUTANEOUS at 21:54

## 2017-07-31 RX ADMIN — Medication 100 MILLIGRAM(S): at 21:54

## 2017-07-31 RX ADMIN — DONEPEZIL HYDROCHLORIDE 10 MILLIGRAM(S): 10 TABLET, FILM COATED ORAL at 21:54

## 2017-07-31 RX ADMIN — TAMSULOSIN HYDROCHLORIDE 0.4 MILLIGRAM(S): 0.4 CAPSULE ORAL at 21:54

## 2017-07-31 RX ADMIN — ATORVASTATIN CALCIUM 80 MILLIGRAM(S): 80 TABLET, FILM COATED ORAL at 21:54

## 2017-07-31 NOTE — H&P ADULT - NSHPLABSRESULTS_GEN_ALL_CORE
8.4    6.0   )-----------( 144      ( 31 Jul 2017 15:42 )             25.8   07-31    145  |  107  |  12  ----------------------------<  110<H>  3.7   |  27  |  1.02    Ca    8.5      31 Jul 2017 15:44    TPro  5.9<L>  /  Alb  2.6<L>  /  TBili  0.4  /  DBili  x   /  AST  13  /  ALT  12  /  AlkPhos  76  07-31

## 2017-07-31 NOTE — H&P ADULT - ASSESSMENT
Scrotal hematoma s/p removal of IPP  - Admit   - compression dressing  - Hold Eliquis  - Cardio consult- Emelyn   - blood transfusion Scrotal hematoma/oozing s/p removal of infected IPP, now anemic  - Admit   - compression dressing  - Hold Eliquis  - Cardio consult- Emelyn   - blood transfusion

## 2017-07-31 NOTE — ED PROVIDER NOTE - CONSTITUTIONAL, MLM
normal... Well appearing, well nourished, awake, alert, oriented to person, place, time/situation and in no apparent distress. chronically ill appearing male,  well nourished, awake, alert, oriented to person, and in no apparent distress.

## 2017-07-31 NOTE — ED PROVIDER NOTE - GENITOURINARY, MLM
Incision in scrotum, wound dehiscence at surgical site, thrombus noted in area, active bleed from site Incision in scrotum, wound dehiscence at surgical site, hematoma noted in area, active bleed from site

## 2017-07-31 NOTE — ED PROVIDER NOTE - OBJECTIVE STATEMENT
89 year old male with PMH of Afib on Eliquis presents with bleeding in the perineal, scrotal area. Patient had surgery on Wednesaday for penile implant removal and has been bleeding from the surgical site since being discharged on Friday. He has continued to take Eliquis and last dose was this morning. His urologist is Dr. Jung. He had some episodes of lightheadedness yesterday and today. He denies chest pain, shortness of breath, dizziness, melena, hematochezia, hematemesis.

## 2017-07-31 NOTE — H&P ADULT - NSHPPHYSICALEXAM_GEN_ALL_CORE
Gen: NAD  Abdomen: soft NT ND  Back: no CVAT  : Scrotal incision with some oozing, +tenderness, compression dressing placed

## 2017-07-31 NOTE — ED ADULT NURSE NOTE - OBJECTIVE STATEMENT
89 y/o male brought in via EMS s/p penile prosthesis removal on Wednesday 7/26/17. Pt states his visiting nurse called EMS due to a large amount of bleeding from the suture site. Pt is currently on Eliquis due to history of stroke and afib, PMH includes Bladder CA and pacemaker placement. Blood is bright red from perianal/scrotal incision site. Quarter sized clot is located in the opened incision area. A&Ox4 gross neuro intact. Pt is hard of hearing. Lungs clear and equal bilateral. S1S2 heart sounds heard. Pulse motor sensory present x4. Abdomen soft nontender nondistended. Mild pallor present in mucosal membranes. Safety and comfort measures maintained. Family member at the bedside.

## 2017-07-31 NOTE — ED ADULT NURSE REASSESSMENT NOTE - NS ED NURSE REASSESS COMMENT FT1
received report from PUSHPA Barros. patient resting comfortably in bed in no acute distress. pending type and screen. patient denies pain at this time. VSS. waiting for bed.

## 2017-07-31 NOTE — ED PROVIDER NOTE - MEDICAL DECISION MAKING DETAILS
Order CBC, UA and culture, and PT/INR Order CBC, UA and culture, and PT/INR  Andi: Patient on eliquis with afib, s/p penile implant removal here for bleeding at incision site, and hematoma. will get labs, urology consult, reassess

## 2017-07-31 NOTE — H&P ADULT - HISTORY OF PRESENT ILLNESS
89 year old male s/p removal of IPP last Wednesday, c/o progressively worsening bleeding from surgical incision.  He was sent by his visiting nurse. Denies any fever or chills, nausea or vomiting, or diarrhea.  No chest pain or dizziness.

## 2017-07-31 NOTE — ED PROVIDER NOTE - NEUROLOGICAL, MLM
Alert and oriented, no focal deficits, no motor or sensory deficits. Alert and awake no focal deficits

## 2017-08-01 LAB
CULTURE RESULTS: NO GROWTH — SIGNIFICANT CHANGE UP
HCT VFR BLD CALC: 23.7 % — LOW (ref 39–50)
HCT VFR BLD CALC: 25 % — LOW (ref 39–50)
HCT VFR BLD CALC: 28.2 % — LOW (ref 39–50)
HGB BLD-MCNC: 7.6 G/DL — LOW (ref 13–17)
HGB BLD-MCNC: 8.4 G/DL — LOW (ref 13–17)
HGB BLD-MCNC: 9.3 G/DL — LOW (ref 13–17)
MCHC RBC-ENTMCNC: 30.5 PG — SIGNIFICANT CHANGE UP (ref 27–34)
MCHC RBC-ENTMCNC: 31.1 PG — SIGNIFICANT CHANGE UP (ref 27–34)
MCHC RBC-ENTMCNC: 31.2 PG — SIGNIFICANT CHANGE UP (ref 27–34)
MCHC RBC-ENTMCNC: 32.1 GM/DL — SIGNIFICANT CHANGE UP (ref 32–36)
MCHC RBC-ENTMCNC: 33.1 GM/DL — SIGNIFICANT CHANGE UP (ref 32–36)
MCHC RBC-ENTMCNC: 33.5 GM/DL — SIGNIFICANT CHANGE UP (ref 32–36)
MCV RBC AUTO: 92.9 FL — SIGNIFICANT CHANGE UP (ref 80–100)
MCV RBC AUTO: 94.3 FL — SIGNIFICANT CHANGE UP (ref 80–100)
MCV RBC AUTO: 95 FL — SIGNIFICANT CHANGE UP (ref 80–100)
PLATELET # BLD AUTO: 126 K/UL — LOW (ref 150–400)
PLATELET # BLD AUTO: 130 K/UL — LOW (ref 150–400)
PLATELET # BLD AUTO: 150 K/UL — SIGNIFICANT CHANGE UP (ref 150–400)
RBC # BLD: 2.49 M/UL — LOW (ref 4.2–5.8)
RBC # BLD: 2.69 M/UL — LOW (ref 4.2–5.8)
RBC # BLD: 2.99 M/UL — LOW (ref 4.2–5.8)
RBC # FLD: 14 % — SIGNIFICANT CHANGE UP (ref 10.3–14.5)
RBC # FLD: 14 % — SIGNIFICANT CHANGE UP (ref 10.3–14.5)
RBC # FLD: 14.1 % — SIGNIFICANT CHANGE UP (ref 10.3–14.5)
SPECIMEN SOURCE: SIGNIFICANT CHANGE UP
WBC # BLD: 5.7 K/UL — SIGNIFICANT CHANGE UP (ref 3.8–10.5)
WBC # BLD: 6.9 K/UL — SIGNIFICANT CHANGE UP (ref 3.8–10.5)
WBC # BLD: 7.9 K/UL — SIGNIFICANT CHANGE UP (ref 3.8–10.5)
WBC # FLD AUTO: 5.7 K/UL — SIGNIFICANT CHANGE UP (ref 3.8–10.5)
WBC # FLD AUTO: 6.9 K/UL — SIGNIFICANT CHANGE UP (ref 3.8–10.5)
WBC # FLD AUTO: 7.9 K/UL — SIGNIFICANT CHANGE UP (ref 3.8–10.5)

## 2017-08-01 RX ADMIN — AMIODARONE HYDROCHLORIDE 100 MILLIGRAM(S): 400 TABLET ORAL at 05:29

## 2017-08-01 RX ADMIN — SODIUM CHLORIDE 50 MILLILITER(S): 9 INJECTION, SOLUTION INTRAVENOUS at 08:01

## 2017-08-01 RX ADMIN — HEPARIN SODIUM 5000 UNIT(S): 5000 INJECTION INTRAVENOUS; SUBCUTANEOUS at 17:04

## 2017-08-01 RX ADMIN — Medication 81 MILLIGRAM(S): at 11:06

## 2017-08-01 RX ADMIN — Medication 325 MILLIGRAM(S): at 17:04

## 2017-08-01 RX ADMIN — SENNA PLUS 1 TABLET(S): 8.6 TABLET ORAL at 21:38

## 2017-08-01 RX ADMIN — ATORVASTATIN CALCIUM 80 MILLIGRAM(S): 80 TABLET, FILM COATED ORAL at 21:38

## 2017-08-01 RX ADMIN — Medication 325 MILLIGRAM(S): at 08:00

## 2017-08-01 RX ADMIN — Medication 10 MILLIGRAM(S): at 03:06

## 2017-08-01 RX ADMIN — HEPARIN SODIUM 5000 UNIT(S): 5000 INJECTION INTRAVENOUS; SUBCUTANEOUS at 21:38

## 2017-08-01 RX ADMIN — TAMSULOSIN HYDROCHLORIDE 0.4 MILLIGRAM(S): 0.4 CAPSULE ORAL at 21:38

## 2017-08-01 RX ADMIN — LISINOPRIL 10 MILLIGRAM(S): 2.5 TABLET ORAL at 05:29

## 2017-08-01 RX ADMIN — HEPARIN SODIUM 5000 UNIT(S): 5000 INJECTION INTRAVENOUS; SUBCUTANEOUS at 05:29

## 2017-08-01 RX ADMIN — Medication 100 MILLIGRAM(S): at 05:29

## 2017-08-01 RX ADMIN — PANTOPRAZOLE SODIUM 40 MILLIGRAM(S): 20 TABLET, DELAYED RELEASE ORAL at 05:29

## 2017-08-01 RX ADMIN — Medication 100 MILLIGRAM(S): at 17:04

## 2017-08-01 RX ADMIN — Medication 100 MILLIGRAM(S): at 21:38

## 2017-08-01 RX ADMIN — MONTELUKAST 10 MILLIGRAM(S): 4 TABLET, CHEWABLE ORAL at 11:06

## 2017-08-01 RX ADMIN — MAGNESIUM OXIDE 400 MG ORAL TABLET 400 MILLIGRAM(S): 241.3 TABLET ORAL at 11:06

## 2017-08-01 NOTE — PROGRESS NOTE ADULT - ATTENDING COMMENTS
pt seen and examined. will need to hold anticoagulation and monitor CBC. Once stable, will plan discharge.

## 2017-08-01 NOTE — CONSULT NOTE ADULT - ASSESSMENT
AFIB  paced , HR controlled. eliquis on hold due to acute bleed. The calculated [id number] score is 6 , resume a/c once deemed safe from  standpoint.   dc Aricept given high risk of qt prolongation with amio.    HTN  stable  cont current meds    Chronic mild systolic chf  stable  cont bb/ace

## 2017-08-01 NOTE — CONSULT NOTE ADULT - SUBJECTIVE AND OBJECTIVE BOX
CHIEF COMPLAINT:    HISTORY OF PRESENT ILLNESS:  history from chart due to pt mild dementia   89 year old male with history of BPH, non-muscle invasive bladder cancer s/p TURBT, BCG, A fib on eliquis, dementia, had extraction of penile implant last week now with oozing and bleeding and anemia requiring transfusion.   He denies any chest pain, sob, palpitation, dizziness or syncope.       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  BPH (benign prostatic hypertrophy)  Hematuria  Asthma, mild intermittent: last use of rescue inhaler 2 weeks ago  Dyslipidemia  HTN (hypertension), benign  Atrial fibrillation: diagnosed 4 years ago treated medically, on coumadin  History of permanent cardiac pacemaker placement  S/P TURP          MEDICATIONS:  heparin  Injectable 5000 Unit(s) SubCutaneous every 8 hours  aspirin enteric coated 81 milliGRAM(s) Oral daily  lisinopril 10 milliGRAM(s) Oral daily  tamsulosin 0.4 milliGRAM(s) Oral at bedtime  amiodarone    Tablet 100 milliGRAM(s) Oral daily  metoprolol 100 milliGRAM(s) Oral two times a day      montelukast 10 milliGRAM(s) Oral daily    donepezil 10 milliGRAM(s) Oral at bedtime    docusate sodium 100 milliGRAM(s) Oral three times a day  senna 1 Tablet(s) Oral at bedtime  pantoprazole    Tablet 40 milliGRAM(s) Oral before breakfast    atorvastatin 80 milliGRAM(s) Oral at bedtime    dextrose 5% + sodium chloride 0.45%. 1000 milliLiter(s) IV Continuous <Continuous>  ferrous    sulfate 325 milliGRAM(s) Oral two times a day with meals  magnesium oxide 400 milliGRAM(s) Oral daily      FAMILY HISTORY:  Family history of breast cancer in sister (Sibling)  Family history of type II diabetes mellitus  Family history of myocardial infarction      Non-contributory    SOCIAL HISTORY:    No tobacco, drugs or etoh    Allergies    No Known Allergies    Intolerances    	    REVIEW OF SYSTEMS:  as above  The rest of the 14 points ROS reviewed and except above they are unremarkable.        PHYSICAL EXAM:  T(C): 36.6 (08-01-17 @ 04:40), Max: 37.1 (07-31-17 @ 15:07)  HR: 71 (08-01-17 @ 04:40) (64 - 92)  BP: 117/74 (08-01-17 @ 04:40) (108/72 - 141/72)  RR: 16 (08-01-17 @ 04:40) (16 - 18)  SpO2: 96% (08-01-17 @ 04:40) (96% - 100%)  Wt(kg): --  I&O's Summary    31 Jul 2017 07:01  -  01 Aug 2017 07:00  --------------------------------------------------------  IN: 850 mL / OUT: 1100 mL / NET: -250 mL        Appearance: Normal	  HEENT:   Normal oral mucosa, PERRL, EOMI	  Cardiovascular: Normal S1 S2,    Murmur:   Neck: JVP normal  Respiratory: Lungs clear to auscultation  Gastrointestinal:  Soft, Non-tender, + BS	  Skin: normal   Neuro: No gross deficits.   Psychiatry:  Mood & affect appropriate  Ext: No edema    TELEMETRY: 	    ECG:  	  RADIOLOGY:  OTHER: 	  	  LABS:	 	    CARDIAC MARKERS:                                  9.3    6.9   )-----------( 150      ( 01 Aug 2017 06:53 )             28.2     07-31    145  |  107  |  12  ----------------------------<  110<H>  3.7   |  27  |  1.02    Ca    8.5      31 Jul 2017 15:44    TPro  5.9<L>  /  Alb  2.6<L>  /  TBili  0.4  /  DBili  x   /  AST  13  /  ALT  12  /  AlkPhos  76  07-31    proBNP:   Lipid Profile:   HgA1c:   TSH:   < from: 12 Lead ECG (07.23.17 @ 17:53) >  Diagnosis Line DEMAND PACEMAKER; INTERPRETATION IS BASED ON INTRINSIC RHYTHM  JUNCTIONAL RHYTHM WITH PREMATURE SUPRAVENTRICULAR COMPLEXES AND FUSION COMPLEXES  INCOMPLETE RIGHT BUNDLE BRANCH BLOCK  LEFT ANTERIOR FASCICULAR BLOCK  ST & T WAVE ABNORMALITY, CONSIDER INFERIOR ISCHEMIA  ST & T WAVE ABNORMALITY, CONSIDER ANTEROLATERAL ISCHEMIA  ABNORMAL ECG    < end of copied text >

## 2017-08-01 NOTE — PROVIDER CONTACT NOTE (OTHER) - ASSESSMENT
BP dropped to 60/44 when c/o dizziness. Pt safety brought back to bed w return to pressure of 102/77 once situated in bed.

## 2017-08-01 NOTE — PROVIDER CONTACT NOTE (OTHER) - BACKGROUND
s/p penile prosthesis removal, scrotal sutures in place. Actively bleeding. 2 Units prbc given overnight into morning.

## 2017-08-01 NOTE — PROVIDER CONTACT NOTE (OTHER) - BACKGROUND
pt received 2 units prbc overnight into morning, hypotensive in early afternoon, noted blood loss from scrotal incision. CBC obtained at 1230.

## 2017-08-01 NOTE — PROVIDER CONTACT NOTE (OTHER) - SITUATION
pt actively bleeding from scrotal wound, patient brought to bathroom for BM, c/o dizziness after BM. VS obtained.

## 2017-08-01 NOTE — PROGRESS NOTE ADULT - SUBJECTIVE AND OBJECTIVE BOX
UROLOGY PA PROGRESS NOTE:     Subjective:  no acute events overnight.     Objective:  Vital signs  T(F): , Max: 98.8 (17 @ 15:07)  HR: 71 (17 @ 04:40)  BP: 117/74 (17 @ 04:40)  SpO2: 96% (17 @ 04:40)  Wt(kg): --    Output      @ 07:01  -   @ 06:31  --------------------------------------------------------  IN: 850 mL / OUT: 1100 mL / NET: -250 mL        Physical Exam:  Gen: NAD  Abd: soft, NT/ND  : +dorantes draining clear urine. scrotal swelling, no erythema, no active bleeding, opening at previous penrose site with blood clot, unable to express blood.     Labs:        x     / x     /1.02         6.0   / 25.8<L> /x                              8.4    6.0   )-----------( 144      ( 2017 15:42 )             25.8         145  |  107  |  12  ----------------------------<  110<H>  3.7   |  27  |  1.02    Ca    8.5      2017 15:44    TPro  5.9<L>  /  Alb  2.6<L>  /  TBili  0.4  /  DBili  x   /  AST  13  /  ALT  12  /  AlkPhos  76      PT/INR - ( 2017 15:42 )   PT: 20.1 sec;   INR: 1.84 ratio         PTT - ( 2017 15:42 )  PTT:34.6 sec  Urinalysis Basic - ( 2017 18:44 )    Color: Yellow / Appearance: Clear / S.025 / pH: x  Gluc: x / Ketone: Trace  / Bili: Negative / Urobili: Negative   Blood: x / Protein: Trace / Nitrite: Negative   Leuk Esterase: Trace / RBC: 10-25 /HPF / WBC 2-5 /HPF   Sq Epi: x / Non Sq Epi: x / Bacteria: Few /HPF

## 2017-08-01 NOTE — PROVIDER CONTACT NOTE (OTHER) - ASSESSMENT
CBC recheck ordered now and again at 6pm. Pt refusing blood draw at this time, stating frustration. Pt educated on need to monitor labs closely d/t blood loss and hypotension, continues to refuse.

## 2017-08-02 LAB
HCT VFR BLD CALC: 23.3 % — LOW (ref 39–50)
HCT VFR BLD CALC: 23.9 % — LOW (ref 39–50)
HGB BLD-MCNC: 7.7 G/DL — LOW (ref 13–17)
HGB BLD-MCNC: 8 G/DL — LOW (ref 13–17)
MCHC RBC-ENTMCNC: 31.1 PG — SIGNIFICANT CHANGE UP (ref 27–34)
MCHC RBC-ENTMCNC: 31.2 PG — SIGNIFICANT CHANGE UP (ref 27–34)
MCHC RBC-ENTMCNC: 33.1 GM/DL — SIGNIFICANT CHANGE UP (ref 32–36)
MCHC RBC-ENTMCNC: 33.3 GM/DL — SIGNIFICANT CHANGE UP (ref 32–36)
MCV RBC AUTO: 93.7 FL — SIGNIFICANT CHANGE UP (ref 80–100)
MCV RBC AUTO: 94 FL — SIGNIFICANT CHANGE UP (ref 80–100)
PLATELET # BLD AUTO: 123 K/UL — LOW (ref 150–400)
PLATELET # BLD AUTO: 140 K/UL — LOW (ref 150–400)
RBC # BLD: 2.48 M/UL — LOW (ref 4.2–5.8)
RBC # BLD: 2.55 M/UL — LOW (ref 4.2–5.8)
RBC # FLD: 14.2 % — SIGNIFICANT CHANGE UP (ref 10.3–14.5)
RBC # FLD: 14.3 % — SIGNIFICANT CHANGE UP (ref 10.3–14.5)
WBC # BLD: 6.7 K/UL — SIGNIFICANT CHANGE UP (ref 3.8–10.5)
WBC # BLD: 6.9 K/UL — SIGNIFICANT CHANGE UP (ref 3.8–10.5)
WBC # FLD AUTO: 6.7 K/UL — SIGNIFICANT CHANGE UP (ref 3.8–10.5)
WBC # FLD AUTO: 6.9 K/UL — SIGNIFICANT CHANGE UP (ref 3.8–10.5)

## 2017-08-02 RX ADMIN — MAGNESIUM OXIDE 400 MG ORAL TABLET 400 MILLIGRAM(S): 241.3 TABLET ORAL at 13:00

## 2017-08-02 RX ADMIN — HEPARIN SODIUM 5000 UNIT(S): 5000 INJECTION INTRAVENOUS; SUBCUTANEOUS at 05:21

## 2017-08-02 RX ADMIN — Medication 325 MILLIGRAM(S): at 17:43

## 2017-08-02 RX ADMIN — Medication 100 MILLIGRAM(S): at 13:00

## 2017-08-02 RX ADMIN — Medication 100 MILLIGRAM(S): at 20:57

## 2017-08-02 RX ADMIN — SENNA PLUS 1 TABLET(S): 8.6 TABLET ORAL at 20:57

## 2017-08-02 RX ADMIN — MONTELUKAST 10 MILLIGRAM(S): 4 TABLET, CHEWABLE ORAL at 13:00

## 2017-08-02 RX ADMIN — Medication 81 MILLIGRAM(S): at 12:59

## 2017-08-02 RX ADMIN — PANTOPRAZOLE SODIUM 40 MILLIGRAM(S): 20 TABLET, DELAYED RELEASE ORAL at 05:21

## 2017-08-02 RX ADMIN — Medication 100 MILLIGRAM(S): at 05:21

## 2017-08-02 RX ADMIN — HEPARIN SODIUM 5000 UNIT(S): 5000 INJECTION INTRAVENOUS; SUBCUTANEOUS at 20:57

## 2017-08-02 RX ADMIN — HEPARIN SODIUM 5000 UNIT(S): 5000 INJECTION INTRAVENOUS; SUBCUTANEOUS at 12:59

## 2017-08-02 RX ADMIN — TAMSULOSIN HYDROCHLORIDE 0.4 MILLIGRAM(S): 0.4 CAPSULE ORAL at 20:56

## 2017-08-02 RX ADMIN — ATORVASTATIN CALCIUM 80 MILLIGRAM(S): 80 TABLET, FILM COATED ORAL at 20:57

## 2017-08-02 RX ADMIN — SODIUM CHLORIDE 75 MILLILITER(S): 9 INJECTION, SOLUTION INTRAVENOUS at 13:02

## 2017-08-02 RX ADMIN — Medication 325 MILLIGRAM(S): at 08:09

## 2017-08-02 NOTE — PROGRESS NOTE ADULT - SUBJECTIVE AND OBJECTIVE BOX
S: No issues overnight, blood counts stable after transfusion on admission, no pain, no fevers, minimal drainage from wound    O:  T(F): 98 (08-02-17 @ 04:35), Max: 98.2 (08-01-17 @ 23:38)  HR: 71 (08-02-17 @ 04:35) (71 - 74)  BP: 110/76 (08-02-17 @ 04:35) (99/64 - 110/76)  RR: 18 (08-02-17 @ 04:35) (18 - 18)  SpO2: 97% (08-02-17 @ 04:35) (96% - 99%)  Wt(kg): --      08-01 @ 07:01  -  08-02 @ 06:35  --------------------------------------------------------  IN: 1110 mL / OUT: 700 mL / NET: 410 mL      PE  NAD  dorantes secure, urine clear  improved scrotal edema, no cellulitis  1cm skin incision open at prior penrose site  lysing hematoma and stable clots on wound    Labs:  WBC 7.9   Hct 25.0  Cr --  WBC 5.7   Hct 23.7  Cr --  WBC 6.9   Hct 28.2  Cr --

## 2017-08-02 NOTE — PROGRESS NOTE ADULT - SUBJECTIVE AND OBJECTIVE BOX
Subjective: Patient seen and examined. No new events except as noted.     SUBJECTIVE/ROS:  No chest pain, or sob.     MEDICATIONS:  MEDICATIONS  (STANDING):  heparin  Injectable 5000 Unit(s) SubCutaneous every 8 hours  dextrose 5% + sodium chloride 0.45%. 1000 milliLiter(s) (75 mL/Hr) IV Continuous <Continuous>  aspirin enteric coated 81 milliGRAM(s) Oral daily  lisinopril 10 milliGRAM(s) Oral daily  tamsulosin 0.4 milliGRAM(s) Oral at bedtime  amiodarone    Tablet 100 milliGRAM(s) Oral daily  atorvastatin 80 milliGRAM(s) Oral at bedtime  metoprolol 100 milliGRAM(s) Oral two times a day  docusate sodium 100 milliGRAM(s) Oral three times a day  senna 1 Tablet(s) Oral at bedtime  ferrous    sulfate 325 milliGRAM(s) Oral two times a day with meals  montelukast 10 milliGRAM(s) Oral daily  magnesium oxide 400 milliGRAM(s) Oral daily  pantoprazole    Tablet 40 milliGRAM(s) Oral before breakfast      PHYSICAL EXAM:  T(C): 36.7 (08-02-17 @ 13:33), Max: 36.8 (08-01-17 @ 23:38)  HR: 76 (08-02-17 @ 13:33) (68 - 76)  BP: 106/71 (08-02-17 @ 13:33) (97/64 - 120/72)  RR: 18 (08-02-17 @ 13:33) (18 - 18)  SpO2: 99% (08-02-17 @ 13:33) (96% - 99%)  Wt(kg): --  I&O's Summary    01 Aug 2017 07:01  -  02 Aug 2017 07:00  --------------------------------------------------------  IN: 2250 mL / OUT: 700 mL / NET: 1550 mL    02 Aug 2017 07:01  -  02 Aug 2017 15:58  --------------------------------------------------------  IN: 1085 mL / OUT: 250 mL / NET: 835 mL          Appearance: Normal	  HEENT:   Normal oral mucosa, PERRL, EOMI	  Cardiovascular: Normal S1 S2,    Murmur:   Neck: JVP normal  Respiratory: Lungs clear to auscultation  Gastrointestinal:  Soft, Non-tender, + BS	  Skin: normal   Neuro: No gross deficits.   Psychiatry:  Mood & affect appropriate  Ext: No edema        LABS:    CARDIAC MARKERS:                                7.7    6.7   )-----------( 140      ( 02 Aug 2017 15:02 )             23.3           proBNP:   Lipid Profile:   HgA1c:   TSH:           TELEMETRY: 	    ECG:  	  RADIOLOGY:   DIAGNOSTIC TESTING:  Echocardiogram:  Catheterization:  Stress Test:    OTHER:

## 2017-08-03 ENCOUNTER — TRANSCRIPTION ENCOUNTER (OUTPATIENT)
Age: 82
End: 2017-08-03

## 2017-08-03 LAB
ANION GAP SERPL CALC-SCNC: 5 MMOL/L — SIGNIFICANT CHANGE UP (ref 5–17)
BLD GP AB SCN SERPL QL: NEGATIVE — SIGNIFICANT CHANGE UP
BUN SERPL-MCNC: 8 MG/DL — SIGNIFICANT CHANGE UP (ref 7–23)
CALCIUM SERPL-MCNC: 8 MG/DL — LOW (ref 8.4–10.5)
CHLORIDE SERPL-SCNC: 106 MMOL/L — SIGNIFICANT CHANGE UP (ref 96–108)
CO2 SERPL-SCNC: 30 MMOL/L — SIGNIFICANT CHANGE UP (ref 22–31)
CREAT SERPL-MCNC: 1.03 MG/DL — SIGNIFICANT CHANGE UP (ref 0.5–1.3)
GLUCOSE SERPL-MCNC: 104 MG/DL — HIGH (ref 70–99)
HCT VFR BLD CALC: 22.4 % — LOW (ref 39–50)
HGB BLD-MCNC: 7.3 G/DL — LOW (ref 13–17)
MCHC RBC-ENTMCNC: 30.7 PG — SIGNIFICANT CHANGE UP (ref 27–34)
MCHC RBC-ENTMCNC: 32.6 GM/DL — SIGNIFICANT CHANGE UP (ref 32–36)
MCV RBC AUTO: 94.3 FL — SIGNIFICANT CHANGE UP (ref 80–100)
PLATELET # BLD AUTO: 132 K/UL — LOW (ref 150–400)
POTASSIUM SERPL-MCNC: 3.4 MMOL/L — LOW (ref 3.5–5.3)
POTASSIUM SERPL-SCNC: 3.4 MMOL/L — LOW (ref 3.5–5.3)
RBC # BLD: 2.38 M/UL — LOW (ref 4.2–5.8)
RBC # FLD: 14.4 % — SIGNIFICANT CHANGE UP (ref 10.3–14.5)
RH IG SCN BLD-IMP: POSITIVE — SIGNIFICANT CHANGE UP
SODIUM SERPL-SCNC: 141 MMOL/L — SIGNIFICANT CHANGE UP (ref 135–145)
WBC # BLD: 5.1 K/UL — SIGNIFICANT CHANGE UP (ref 3.8–10.5)
WBC # FLD AUTO: 5.1 K/UL — SIGNIFICANT CHANGE UP (ref 3.8–10.5)

## 2017-08-03 PROCEDURE — 72193 CT PELVIS W/DYE: CPT | Mod: 26

## 2017-08-03 RX ORDER — ACETAMINOPHEN 500 MG
650 TABLET ORAL EVERY 6 HOURS
Qty: 0 | Refills: 0 | Status: DISCONTINUED | OUTPATIENT
Start: 2017-08-03 | End: 2017-08-04

## 2017-08-03 RX ORDER — FUROSEMIDE 40 MG
10 TABLET ORAL ONCE
Qty: 0 | Refills: 0 | Status: COMPLETED | OUTPATIENT
Start: 2017-08-03 | End: 2017-08-03

## 2017-08-03 RX ORDER — POTASSIUM CHLORIDE 20 MEQ
40 PACKET (EA) ORAL ONCE
Qty: 0 | Refills: 0 | Status: COMPLETED | OUTPATIENT
Start: 2017-08-03 | End: 2017-08-03

## 2017-08-03 RX ADMIN — Medication 325 MILLIGRAM(S): at 16:54

## 2017-08-03 RX ADMIN — Medication 100 MILLIGRAM(S): at 22:34

## 2017-08-03 RX ADMIN — Medication 81 MILLIGRAM(S): at 12:30

## 2017-08-03 RX ADMIN — MAGNESIUM OXIDE 400 MG ORAL TABLET 400 MILLIGRAM(S): 241.3 TABLET ORAL at 12:31

## 2017-08-03 RX ADMIN — AMIODARONE HYDROCHLORIDE 100 MILLIGRAM(S): 400 TABLET ORAL at 06:21

## 2017-08-03 RX ADMIN — Medication 40 MILLIEQUIVALENT(S): at 08:37

## 2017-08-03 RX ADMIN — HEPARIN SODIUM 5000 UNIT(S): 5000 INJECTION INTRAVENOUS; SUBCUTANEOUS at 06:03

## 2017-08-03 RX ADMIN — LISINOPRIL 10 MILLIGRAM(S): 2.5 TABLET ORAL at 06:21

## 2017-08-03 RX ADMIN — SENNA PLUS 1 TABLET(S): 8.6 TABLET ORAL at 22:34

## 2017-08-03 RX ADMIN — Medication 100 MILLIGRAM(S): at 06:03

## 2017-08-03 RX ADMIN — Medication 100 MILLIGRAM(S): at 18:51

## 2017-08-03 RX ADMIN — Medication 325 MILLIGRAM(S): at 08:37

## 2017-08-03 RX ADMIN — HEPARIN SODIUM 5000 UNIT(S): 5000 INJECTION INTRAVENOUS; SUBCUTANEOUS at 22:34

## 2017-08-03 RX ADMIN — Medication 650 MILLIGRAM(S): at 12:33

## 2017-08-03 RX ADMIN — Medication 100 MILLIGRAM(S): at 06:21

## 2017-08-03 RX ADMIN — MONTELUKAST 10 MILLIGRAM(S): 4 TABLET, CHEWABLE ORAL at 12:30

## 2017-08-03 RX ADMIN — Medication 650 MILLIGRAM(S): at 06:04

## 2017-08-03 RX ADMIN — HEPARIN SODIUM 5000 UNIT(S): 5000 INJECTION INTRAVENOUS; SUBCUTANEOUS at 16:54

## 2017-08-03 RX ADMIN — TAMSULOSIN HYDROCHLORIDE 0.4 MILLIGRAM(S): 0.4 CAPSULE ORAL at 22:34

## 2017-08-03 RX ADMIN — ATORVASTATIN CALCIUM 80 MILLIGRAM(S): 80 TABLET, FILM COATED ORAL at 22:34

## 2017-08-03 RX ADMIN — Medication 100 MILLIGRAM(S): at 16:53

## 2017-08-03 RX ADMIN — Medication 10 MILLIGRAM(S): at 16:53

## 2017-08-03 RX ADMIN — PANTOPRAZOLE SODIUM 40 MILLIGRAM(S): 20 TABLET, DELAYED RELEASE ORAL at 06:03

## 2017-08-03 NOTE — DISCHARGE NOTE ADULT - INSTRUCTIONS
If unable to tolerate diet nausea, vomiting, fever above 100.3 chills, or an increase in pain, notify provider or return to ER. If bladder feels full or unable to urinate, or an increase in bleeding notify provider or return to ER.

## 2017-08-03 NOTE — DISCHARGE NOTE ADULT - CONDITIONS AT DISCHARGE
Patient VSS. Patient safety maintained. Patient Iv removed with no signs/symptoms of redness/swelling.

## 2017-08-03 NOTE — DISCHARGE NOTE ADULT - HOSPITAL COURSE
89 yo male admitted with scrotal bleeding s/p removal of penile prosthesis. Received 2u prbc for anemia upon admission.   AVSS and hemodynamically stable since with fluctuating HCTs. Sono shows mild scrotal hematoma. 89 yo male admitted with scrotal bleeding s/p removal of penile prosthesis. Received 2u prbc for anemia upon admission.   AVSS and hemodynamically stable since with fluctuating HCTs. Sono shows mild scrotal hematoma.   on 8/3 he required blood transfusion for acute blood loss anemia secondary to the slow bleeding from his scrotum. on 8/4 his hct was stable and he was dcd in stable condition

## 2017-08-03 NOTE — DISCHARGE NOTE ADULT - PATIENT PORTAL LINK FT
“You can access the [facility] Patient Portal, offered by [facility], by registering with the following website: [email or web address]”

## 2017-08-03 NOTE — PROGRESS NOTE ADULT - SUBJECTIVE AND OBJECTIVE BOX
Subjective: Patient seen and examined. No new events except as noted.     SUBJECTIVE/ROS:  No chest pain, or sob.     MEDICATIONS:  MEDICATIONS  (STANDING):  heparin  Injectable 5000 Unit(s) SubCutaneous every 8 hours  dextrose 5% + sodium chloride 0.45%. 1000 milliLiter(s) (75 mL/Hr) IV Continuous <Continuous>  aspirin enteric coated 81 milliGRAM(s) Oral daily  lisinopril 10 milliGRAM(s) Oral daily  tamsulosin 0.4 milliGRAM(s) Oral at bedtime  amiodarone    Tablet 100 milliGRAM(s) Oral daily  atorvastatin 80 milliGRAM(s) Oral at bedtime  metoprolol 100 milliGRAM(s) Oral two times a day  docusate sodium 100 milliGRAM(s) Oral three times a day  senna 1 Tablet(s) Oral at bedtime  ferrous    sulfate 325 milliGRAM(s) Oral two times a day with meals  montelukast 10 milliGRAM(s) Oral daily  magnesium oxide 400 milliGRAM(s) Oral daily  pantoprazole    Tablet 40 milliGRAM(s) Oral before breakfast  furosemide   Injectable 10 milliGRAM(s) IV Push once      PHYSICAL EXAM:  T(C): 36.6 (08-03-17 @ 13:30), Max: 36.6 (08-02-17 @ 16:18)  HR: 52 (08-03-17 @ 13:30) (48 - 98)  BP: 146/82 (08-03-17 @ 13:30) (90/60 - 146/82)  RR: 18 (08-03-17 @ 13:30) (16 - 18)  SpO2: 100% (08-03-17 @ 13:30) (96% - 100%)  Wt(kg): --  I&O's Summary    02 Aug 2017 07:01  -  03 Aug 2017 07:00  --------------------------------------------------------  IN: 2800 mL / OUT: 1450 mL / NET: 1350 mL    03 Aug 2017 07:01  -  03 Aug 2017 15:24  --------------------------------------------------------  IN: 350 mL / OUT: 100 mL / NET: 250 mL          Appearance: Normal	  HEENT:   Normal oral mucosa, PERRL, EOMI	  Cardiovascular: Normal S1 S2,    Murmur:   Neck: JVP normal  Respiratory: Lungs clear to auscultation  Gastrointestinal:  Soft, Non-tender, + BS	  Skin: normal   Neuro: No gross deficits.   Psychiatry:  Mood & affect appropriate  Ext: No edema        LABS:    CARDIAC MARKERS:                                7.3    5.1   )-----------( 132      ( 03 Aug 2017 07:00 )             22.4     08-03    141  |  106  |  8   ----------------------------<  104<H>  3.4<L>   |  30  |  1.03    Ca    8.0<L>      03 Aug 2017 07:00      proBNP:   Lipid Profile:   HgA1c:   TSH:           TELEMETRY: 	    ECG:  	  RADIOLOGY:   DIAGNOSTIC TESTING:  Echocardiogram:  Catheterization:  Stress Test:    OTHER:

## 2017-08-03 NOTE — DISCHARGE NOTE ADULT - CARE PROVIDER_API CALL
Jimmy Rdz (MD), Urology  [address] [place] [id number]  Phone: [phone]  Fax: [phone]

## 2017-08-03 NOTE — DISCHARGE NOTE ADULT - MEDICATION SUMMARY - MEDICATIONS TO TAKE
I will START or STAY ON the medications listed below when I get home from the hospital:    aspirin 81 mg oral tablet  -- 1 tab(s) by mouth every other day  -- Indication: For heart health    Tylenol 325 mg oral tablet  -- 2 tab(s) by mouth every 6 hours, As needed, Moderate Pain (4 - 6)  -- Indication: For pain    Zestril 10 mg oral tablet  -- 1 tab(s) by mouth once a day  -- Indication: For blood pressure    Flomax 0.4 mg oral capsule  -- 1 cap(s) by mouth once a day  -- Indication: For bph    amiodarone 100 mg oral tablet  -- 1 tab(s) by mouth once a day  -- Indication: For Arrhythmia     apixaban 5 mg oral tablet  -- 1 tab(s) by mouth 2 times a day  -- Indication: For CVA    atorvastatin 80 mg oral tablet  -- 1 tab(s) by mouth once a day (at bedtime)  -- Indication: For cholesterol    metoprolol tartrate 100 mg oral tablet  -- 1 tab(s) by mouth 2 times a day  -- Indication: For blood pressure    donepezil 10 mg oral tablet  -- 1 tab(s) by mouth once a day (at bedtime)  -- Indication: For home med    ferrous sulfate 325 mg (65 mg elemental iron) oral tablet  -- 1 tab(s) by mouth 2 times a day  -- Indication: For home med    docusate sodium 100 mg oral capsule  -- 1 cap(s) by mouth 3 times a day  -- Indication: For constipation    senna oral tablet  -- 1 tab(s) by mouth once a day (at bedtime)  -- Indication: For constipation    montelukast 10 mg oral tablet  -- 1 tab(s) by mouth once a day (in the evening)  -- Indication: For home med    magnesium oxide 400 mg (241.3 mg elemental magnesium) oral tablet  -- 1 tab(s) by mouth once a day  -- Indication: For home med    pantoprazole 40 mg oral delayed release tablet  -- 1 tab(s) by mouth once a day  -- Indication: For gerd I will START or STAY ON the medications listed below when I get home from the hospital:    aspirin 81 mg oral tablet  -- 1 tab(s) by mouth every other day  -- Indication: For heart health    Tylenol 325 mg oral tablet  -- 2 tab(s) by mouth every 6 hours, As needed, Moderate Pain (4 - 6)  -- Indication: For pain    Zestril 10 mg oral tablet  -- 1 tab(s) by mouth once a day  -- Indication: For blood pressure    Flomax 0.4 mg oral capsule  -- 1 cap(s) by mouth once a day  -- Indication: For bph    amiodarone 100 mg oral tablet  -- 1 tab(s) by mouth once a day  -- Indication: For Arrhythmia     apixaban 5 mg oral tablet  -- 1 tab(s) by mouth 2 times a day  -- Indication: For DO NOT RESTART UNTIL YOU SEE DR. ATKINSON IN THE OFFICE     atorvastatin 80 mg oral tablet  -- 1 tab(s) by mouth once a day (at bedtime)  -- Indication: For cholesterol    metoprolol tartrate 100 mg oral tablet  -- 1 tab(s) by mouth 2 times a day  -- Indication: For blood pressure    donepezil 10 mg oral tablet  -- 1 tab(s) by mouth once a day (at bedtime)  -- Indication: For home med    ferrous sulfate 325 mg (65 mg elemental iron) oral tablet  -- 1 tab(s) by mouth 2 times a day  -- Indication: For home med    docusate sodium 100 mg oral capsule  -- 1 cap(s) by mouth 3 times a day  -- Indication: For constipation    senna oral tablet  -- 1 tab(s) by mouth once a day (at bedtime)  -- Indication: For constipation    montelukast 10 mg oral tablet  -- 1 tab(s) by mouth once a day (in the evening)  -- Indication: For home med    magnesium oxide 400 mg (241.3 mg elemental magnesium) oral tablet  -- 1 tab(s) by mouth once a day  -- Indication: For home med    pantoprazole 40 mg oral delayed release tablet  -- 1 tab(s) by mouth once a day  -- Indication: For gerd

## 2017-08-03 NOTE — DISCHARGE NOTE ADULT - CARE PLAN
Principal Discharge DX:	Erosion of penile prosthesis  Goal:	pain control  Instructions for follow-up, activity and diet:	Call the office if you have fever greater than 101, difficulty urinating, pain not relieved with pain medication, nausea/vomiting. DO NOT take eliquis until cleared by your physician.  Secondary Diagnosis:	HTN (hypertension), benign  Instructions for follow-up, activity and diet:	continue home meds  Secondary Diagnosis:	Atrial fibrillation  Instructions for follow-up, activity and diet:	follow up with your cardiologist about restarting your eliquis within the next 1-2 weeks. Principal Discharge DX:	Erosion of penile prosthesis  Goal:	pain control  Instructions for follow-up, activity and diet:	Call the office if you have fever greater than 101, difficulty urinating, pain not relieved with pain medication, nausea/vomiting. DO NOT take eliquis until you see Dr. Rdz in the office   Call to make your follow up appointment for one week. You may need to place a gauze under your scrotum to keep the area dry and clean. You may shower. DO NOT take a bath until skin is fully healed.  Secondary Diagnosis:	HTN (hypertension), benign  Instructions for follow-up, activity and diet:	continue home meds  Secondary Diagnosis:	Atrial fibrillation  Instructions for follow-up, activity and diet:	hold Eliquis Principal Discharge DX:	Erosion of penile prosthesis  Goal:	pain control  Instructions for follow-up, activity and diet:	Call the office if you have fever greater than 101, difficulty urinating, pain not relieved with pain medication, nausea/vomiting. DO NOT take eliquis until you see Dr. Rdz in the office   Call to make your follow up appointment for one week. You may need to place a gauze under your scrotum to keep the area dry and clean. You may shower. DO NOT take a bath until skin is fully healed.  Secondary Diagnosis:	HTN (hypertension), benign  Instructions for follow-up, activity and diet:	continue home meds  Secondary Diagnosis:	Atrial fibrillation  Instructions for follow-up, activity and diet:	hold Eliquis until you see Dr Rdz in the office

## 2017-08-03 NOTE — DISCHARGE NOTE ADULT - PLAN OF CARE
pain control Call the office if you have fever greater than 101, difficulty urinating, pain not relieved with pain medication, nausea/vomiting. DO NOT take eliquis until cleared by your physician. follow up with your cardiologist about restarting your eliquis within the next 1-2 weeks. continue home meds Call the office if you have fever greater than 101, difficulty urinating, pain not relieved with pain medication, nausea/vomiting. DO NOT take eliquis until you see Dr. Rdz in the office   Call to make your follow up appointment for one week. You may need to place a gauze under your scrotum to keep the area dry and clean. You may shower. DO NOT take a bath until skin is fully healed. hold Jasielis hold Eliquis until you see Dr Rdz in the office

## 2017-08-03 NOTE — PROGRESS NOTE ADULT - SUBJECTIVE AND OBJECTIVE BOX
UROLOGY PA PROGRESS NOTE:     Subjective:  no acute events overnight     Objective:  Vital signs  T(F): , Max: 98 (08-02-17 @ 09:26)  HR: 67 (08-03-17 @ 06:00)  BP: 116/77 (08-03-17 @ 06:00)  SpO2: 99% (08-03-17 @ 06:00)  Wt(kg): --    Output     08-01 @ 07:01  -  08-02 @ 07:00  --------------------------------------------------------  IN: 2250 mL / OUT: 700 mL / NET: 1550 mL    08-02 @ 07:01  -  08-03 @ 06:23  --------------------------------------------------------  IN: 2800 mL / OUT: 1450 mL / NET: 1350 mL        Physical Exam:  Gen: NAD  Abd: soft , NT/ND  : + improving scrotal edema, small opening with minimal drainage from former penrose site.     Labs:    08-02    6.7   / 23.3<L> /x        08-02    6.9   / 23.9<L> /x                              7.7    6.7   )-----------( 140      ( 02 Aug 2017 15:02 )             23.3

## 2017-08-03 NOTE — DISCHARGE NOTE ADULT - MEDICATION SUMMARY - MEDICATIONS TO STOP TAKING
I will STOP taking the medications listed below when I get home from the hospital:    Augmentin 875 mg-125 mg oral tablet  -- 1 tab(s) by mouth every 12 hours  -- Finish all this medication unless otherwise directed by prescriber.  Take with food or milk.

## 2017-08-04 VITALS
HEART RATE: 66 BPM | TEMPERATURE: 98 F | DIASTOLIC BLOOD PRESSURE: 76 MMHG | SYSTOLIC BLOOD PRESSURE: 125 MMHG | RESPIRATION RATE: 17 BRPM | OXYGEN SATURATION: 99 %

## 2017-08-04 LAB
HCT VFR BLD CALC: 29.3 % — LOW (ref 39–50)
HCT VFR BLD CALC: 31.5 % — LOW (ref 39–50)
HGB BLD-MCNC: 10.5 G/DL — LOW (ref 13–17)
HGB BLD-MCNC: 9.8 G/DL — LOW (ref 13–17)
MCHC RBC-ENTMCNC: 30.8 PG — SIGNIFICANT CHANGE UP (ref 27–34)
MCHC RBC-ENTMCNC: 31.4 PG — SIGNIFICANT CHANGE UP (ref 27–34)
MCHC RBC-ENTMCNC: 33.3 GM/DL — SIGNIFICANT CHANGE UP (ref 32–36)
MCHC RBC-ENTMCNC: 33.5 GM/DL — SIGNIFICANT CHANGE UP (ref 32–36)
MCV RBC AUTO: 92.5 FL — SIGNIFICANT CHANGE UP (ref 80–100)
MCV RBC AUTO: 93.6 FL — SIGNIFICANT CHANGE UP (ref 80–100)
PLATELET # BLD AUTO: 138 K/UL — LOW (ref 150–400)
PLATELET # BLD AUTO: 159 K/UL — SIGNIFICANT CHANGE UP (ref 150–400)
RBC # BLD: 3.17 M/UL — LOW (ref 4.2–5.8)
RBC # BLD: 3.36 M/UL — LOW (ref 4.2–5.8)
RBC # FLD: 14.4 % — SIGNIFICANT CHANGE UP (ref 10.3–14.5)
RBC # FLD: 14.7 % — HIGH (ref 10.3–14.5)
WBC # BLD: 6.1 K/UL — SIGNIFICANT CHANGE UP (ref 3.8–10.5)
WBC # BLD: 7 K/UL — SIGNIFICANT CHANGE UP (ref 3.8–10.5)
WBC # FLD AUTO: 6.1 K/UL — SIGNIFICANT CHANGE UP (ref 3.8–10.5)
WBC # FLD AUTO: 7 K/UL — SIGNIFICANT CHANGE UP (ref 3.8–10.5)

## 2017-08-04 PROCEDURE — 85027 COMPLETE CBC AUTOMATED: CPT

## 2017-08-04 PROCEDURE — 80048 BASIC METABOLIC PNL TOTAL CA: CPT

## 2017-08-04 PROCEDURE — 93975 VASCULAR STUDY: CPT

## 2017-08-04 PROCEDURE — 85730 THROMBOPLASTIN TIME PARTIAL: CPT

## 2017-08-04 PROCEDURE — 86850 RBC ANTIBODY SCREEN: CPT

## 2017-08-04 PROCEDURE — 86923 COMPATIBILITY TEST ELECTRIC: CPT

## 2017-08-04 PROCEDURE — 72193 CT PELVIS W/DYE: CPT

## 2017-08-04 PROCEDURE — 85610 PROTHROMBIN TIME: CPT

## 2017-08-04 PROCEDURE — 86901 BLOOD TYPING SEROLOGIC RH(D): CPT

## 2017-08-04 PROCEDURE — 86900 BLOOD TYPING SEROLOGIC ABO: CPT

## 2017-08-04 PROCEDURE — 99285 EMERGENCY DEPT VISIT HI MDM: CPT | Mod: 25

## 2017-08-04 PROCEDURE — 87086 URINE CULTURE/COLONY COUNT: CPT

## 2017-08-04 PROCEDURE — 81001 URINALYSIS AUTO W/SCOPE: CPT

## 2017-08-04 PROCEDURE — 80053 COMPREHEN METABOLIC PANEL: CPT

## 2017-08-04 PROCEDURE — 97161 PT EVAL LOW COMPLEX 20 MIN: CPT

## 2017-08-04 PROCEDURE — P9016: CPT

## 2017-08-04 PROCEDURE — 36430 TRANSFUSION BLD/BLD COMPNT: CPT

## 2017-08-04 PROCEDURE — 76870 US EXAM SCROTUM: CPT

## 2017-08-04 RX ADMIN — HEPARIN SODIUM 5000 UNIT(S): 5000 INJECTION INTRAVENOUS; SUBCUTANEOUS at 05:58

## 2017-08-04 RX ADMIN — AMIODARONE HYDROCHLORIDE 100 MILLIGRAM(S): 400 TABLET ORAL at 05:58

## 2017-08-04 RX ADMIN — LISINOPRIL 10 MILLIGRAM(S): 2.5 TABLET ORAL at 05:58

## 2017-08-04 RX ADMIN — Medication 325 MILLIGRAM(S): at 18:03

## 2017-08-04 RX ADMIN — HEPARIN SODIUM 5000 UNIT(S): 5000 INJECTION INTRAVENOUS; SUBCUTANEOUS at 13:50

## 2017-08-04 RX ADMIN — Medication 100 MILLIGRAM(S): at 05:58

## 2017-08-04 RX ADMIN — Medication 100 MILLIGRAM(S): at 13:50

## 2017-08-04 RX ADMIN — Medication 100 MILLIGRAM(S): at 18:03

## 2017-08-04 RX ADMIN — Medication 100 MILLIGRAM(S): at 05:57

## 2017-08-04 RX ADMIN — Medication 325 MILLIGRAM(S): at 10:23

## 2017-08-04 RX ADMIN — Medication 81 MILLIGRAM(S): at 12:11

## 2017-08-04 RX ADMIN — MONTELUKAST 10 MILLIGRAM(S): 4 TABLET, CHEWABLE ORAL at 12:11

## 2017-08-04 RX ADMIN — MAGNESIUM OXIDE 400 MG ORAL TABLET 400 MILLIGRAM(S): 241.3 TABLET ORAL at 12:11

## 2017-08-04 RX ADMIN — PANTOPRAZOLE SODIUM 40 MILLIGRAM(S): 20 TABLET, DELAYED RELEASE ORAL at 05:57

## 2017-08-04 NOTE — PROGRESS NOTE ADULT - SUBJECTIVE AND OBJECTIVE BOX
UROLOGY PA PROGRESS NOTE:     Subjective: No overnight events. Denies sob, chest pain, f/n/v/d or chills.       Objective:  Vital signs  T(F): , Max: 98.2 (08-04-17 @ 05:55)  HR: 60 (08-04-17 @ 05:55)  BP: 147/71 (08-04-17 @ 05:55)  SpO2: 99% (08-04-17 @ 05:55)  Wt(kg): --    Output     08-02 @ 07:01  -  08-03 @ 07:00  --------------------------------------------------------  IN: 2800 mL / OUT: 1450 mL / NET: 1350 mL    08-03 @ 07:01  -  08-04 @ 06:45  --------------------------------------------------------  IN: 3165 mL / OUT: 950 mL / NET: 2215 mL        Physical Exam:  Gen: NAD+  Abd: Soft, non-tender, non-distended   : light pink urine, no clots     Labs:    08-04    6.1   / 29.3<L> /x        08-03    5.1   / 22.4<L> /1.03                           9.8    6.1   )-----------( 138      ( 04 Aug 2017 00:14 )             29.3     08-03    141  |  106  |  8   ----------------------------<  104<H>  3.4<L>   |  30  |  1.03    Ca    8.0<L>      03 Aug 2017 07:00            Urine Cx: T(F): , Max: 98.2 (08-04-17 @ 05:55)  HR: 60 (08-04-17 @ 05:55)  BP: 147/71 (08-04-17 @ 05:55)  SpO2: 99% (08-04-17 @ 05:55)  Wt(kg): --    Imaging:    A/P: 89 YO male s/p removal of penile prosthesis, scrotal bleeding.   -f/u am labs   -DC planning   -OOB  -DVT PPX UROLOGY PA PROGRESS NOTE:     Subjective: No overnight events. Denies sob, chest pain, f/n/v/d or chills.       Objective:  Vital signs  T(F): , Max: 98.2 (08-04-17 @ 05:55)  HR: 60 (08-04-17 @ 05:55)  BP: 147/71 (08-04-17 @ 05:55)  SpO2: 99% (08-04-17 @ 05:55)  Wt(kg): --    Output     08-02 @ 07:01  -  08-03 @ 07:00  --------------------------------------------------------  IN: 2800 mL / OUT: 1450 mL / NET: 1350 mL    08-03 @ 07:01  -  08-04 @ 06:45  --------------------------------------------------------  IN: 3165 mL / OUT: 950 mL / NET: 2215 mL        Physical Exam:  Gen: NAD+  Abd: Soft, non-tender, non-distended   : clear urine, dorantes in place     Labs:    08-04    6.1   / 29.3<L> /x        08-03    5.1   / 22.4<L> /1.03                           9.8    6.1   )-----------( 138      ( 04 Aug 2017 00:14 )             29.3     08-03    141  |  106  |  8   ----------------------------<  104<H>  3.4<L>   |  30  |  1.03    Ca    8.0<L>      03 Aug 2017 07:00            Urine Cx: T(F): , Max: 98.2 (08-04-17 @ 05:55)  HR: 60 (08-04-17 @ 05:55)  BP: 147/71 (08-04-17 @ 05:55)  SpO2: 99% (08-04-17 @ 05:55)  Wt(kg): --    Imaging:    A/P: 89 YO male s/p removal of penile prosthesis, scrotal bleeding.   -f/u am labs   -DC planning   -OOB  -DVT PPX

## 2017-08-04 NOTE — PHYSICAL THERAPY INITIAL EVALUATION ADULT - ADDITIONAL COMMENTS
Pt lives with spouse in a private house and one flight of steps to the bedroom. Pt was Ind with all ADLs, amb with a RW.

## 2017-08-04 NOTE — PROGRESS NOTE ADULT - SUBJECTIVE AND OBJECTIVE BOX
Subjective: Patient seen and examined. No new events except as noted.     SUBJECTIVE/ROS:  No chest pain, or sob.     MEDICATIONS:  MEDICATIONS  (STANDING):  heparin  Injectable 5000 Unit(s) SubCutaneous every 8 hours  dextrose 5% + sodium chloride 0.45%. 1000 milliLiter(s) (75 mL/Hr) IV Continuous <Continuous>  aspirin enteric coated 81 milliGRAM(s) Oral daily  lisinopril 10 milliGRAM(s) Oral daily  tamsulosin 0.4 milliGRAM(s) Oral at bedtime  amiodarone    Tablet 100 milliGRAM(s) Oral daily  atorvastatin 80 milliGRAM(s) Oral at bedtime  metoprolol 100 milliGRAM(s) Oral two times a day  docusate sodium 100 milliGRAM(s) Oral three times a day  senna 1 Tablet(s) Oral at bedtime  ferrous    sulfate 325 milliGRAM(s) Oral two times a day with meals  montelukast 10 milliGRAM(s) Oral daily  magnesium oxide 400 milliGRAM(s) Oral daily  pantoprazole    Tablet 40 milliGRAM(s) Oral before breakfast      PHYSICAL EXAM:  T(C): 36.8 (08-04-17 @ 05:55), Max: 36.8 (08-04-17 @ 05:55)  HR: 60 (08-04-17 @ 05:55) (48 - 65)  BP: 147/71 (08-04-17 @ 05:55) (90/60 - 147/71)  RR: 17 (08-04-17 @ 05:55) (16 - 18)  SpO2: 99% (08-04-17 @ 05:55) (96% - 100%)  Wt(kg): --  I&O's Summary    03 Aug 2017 07:01  -  04 Aug 2017 07:00  --------------------------------------------------------  IN: 3165 mL / OUT: 950 mL / NET: 2215 mL          Appearance: Normal	  HEENT:   Normal oral mucosa, PERRL, EOMI	  Cardiovascular: Normal S1 S2,    Murmur:   Neck: JVP normal  Respiratory: Lungs clear to auscultation  Gastrointestinal:  Soft, Non-tender, + BS	  Skin: normal   Neuro: No gross deficits.   Psychiatry:  Mood & affect appropriate  Ext: No edema        LABS:    CARDIAC MARKERS:                                9.8    6.1   )-----------( 138      ( 04 Aug 2017 00:14 )             29.3     08-03    141  |  106  |  8   ----------------------------<  104<H>  3.4<L>   |  30  |  1.03    Ca    8.0<L>      03 Aug 2017 07:00      proBNP:   Lipid Profile:   HgA1c:   TSH:           TELEMETRY: 	    ECG:  	  RADIOLOGY:   DIAGNOSTIC TESTING:  Echocardiogram:  Catheterization:  Stress Test:    OTHER:

## 2017-08-04 NOTE — PROGRESS NOTE ADULT - ASSESSMENT
AFIB  paced , HR controlled. eliquis on hold due to acute bleed. The calculated [id number] score is 6 , resume a/c once deemed safe from  standpoint.   dc Aricept given high risk of qt prolongation with amio.    HTN  stable  cont current meds    Chronic mild systolic chf  stable  cont bb/ace
89 yo male with scrotal bleeding s/p recent penile prosthesis explant, s/p 2u prbc overnight  -am cbc/bmp--> trend hct  - f/u cardio   - eliquis on hold
89 yo male admitted with scrotal bleeding s/p removal of penile prosthesis  - f/u CBC  - f/u CT scan read  - transfuse as necessary   - T&S  - d/c planning   - hold eliquis, will restart as outpatient.
A/P: 89M s/p removal of infected IPP c/b post-op hematoma and anemia, doing well and stable    -- CBC, BMP  -- hold Eliquis  -- f/u cards  -- continue dorantes  -- d/c planning if labs stable
AFIB  paced , HR controlled. eliquis on hold due to acute bleed. The calculated [id number] score is 6 , resume a/c once deemed safe from  standpoint.   dc Aricept given high risk of qt prolongation with amio.    HTN  stable  cont current meds    Chronic mild systolic chf  stable  cont bb/ace
AFIB  paced , HR controlled. eliquis on hold due to acute bleed. The calculated [id number] score is 6 , resume a/c once deemed safe from  standpoint.   dc Aricept given high risk of qt prolongation with amio.    HTN  stable  cont current meds    Chronic mild systolic chf  stable  cont bb/ace

## 2017-08-04 NOTE — PHYSICAL THERAPY INITIAL EVALUATION ADULT - PERTINENT HX OF CURRENT PROBLEM, REHAB EVAL
Pt is a 89 y/o male admitted to [facility] on 7/31/17 s/p removal of IPP last Wednesday, c/o progressively worsening bleeding from surgical incision.  He was sent by his visiting nurse. Denies any fever or chills, nausea or vomiting, or diarrhea.  No chest pain or dizziness.

## 2017-08-09 ENCOUNTER — EMERGENCY (EMERGENCY)
Facility: HOSPITAL | Age: 82
LOS: 1 days | Discharge: HOME | End: 2017-08-09
Attending: EMERGENCY MEDICINE | Admitting: EMERGENCY MEDICINE
Payer: MEDICARE

## 2017-08-09 VITALS
DIASTOLIC BLOOD PRESSURE: 77 MMHG | RESPIRATION RATE: 18 BRPM | SYSTOLIC BLOOD PRESSURE: 149 MMHG | OXYGEN SATURATION: 100 % | HEART RATE: 75 BPM

## 2017-08-09 VITALS
SYSTOLIC BLOOD PRESSURE: 151 MMHG | DIASTOLIC BLOOD PRESSURE: 73 MMHG | WEIGHT: 186.07 LBS | OXYGEN SATURATION: 100 % | RESPIRATION RATE: 16 BRPM | HEART RATE: 66 BPM

## 2017-08-09 DIAGNOSIS — Z98.89 OTHER SPECIFIED POSTPROCEDURAL STATES: Chronic | ICD-10-CM

## 2017-08-09 DIAGNOSIS — Z95.0 PRESENCE OF CARDIAC PACEMAKER: Chronic | ICD-10-CM

## 2017-08-09 LAB
ALBUMIN SERPL ELPH-MCNC: 2.8 G/DL — LOW (ref 3.3–5)
ALP SERPL-CCNC: 107 U/L — SIGNIFICANT CHANGE UP (ref 40–120)
ALT FLD-CCNC: 9 U/L — SIGNIFICANT CHANGE UP (ref 4–41)
APPEARANCE UR: SIGNIFICANT CHANGE UP
APTT BLD: 24.1 SEC — LOW (ref 27.5–37.4)
AST SERPL-CCNC: 17 U/L — SIGNIFICANT CHANGE UP (ref 4–40)
BASOPHILS # BLD AUTO: 0.01 K/UL — SIGNIFICANT CHANGE UP (ref 0–0.2)
BASOPHILS NFR BLD AUTO: 0.1 % — SIGNIFICANT CHANGE UP (ref 0–2)
BILIRUB SERPL-MCNC: 0.4 MG/DL — SIGNIFICANT CHANGE UP (ref 0.2–1.2)
BILIRUB UR-MCNC: NEGATIVE — SIGNIFICANT CHANGE UP
BLOOD UR QL VISUAL: HIGH
BUN SERPL-MCNC: 13 MG/DL — SIGNIFICANT CHANGE UP (ref 7–23)
CALCIUM SERPL-MCNC: 8.3 MG/DL — LOW (ref 8.4–10.5)
CHLORIDE SERPL-SCNC: 103 MMOL/L — SIGNIFICANT CHANGE UP (ref 98–107)
CO2 SERPL-SCNC: 24 MMOL/L — SIGNIFICANT CHANGE UP (ref 22–31)
COLOR SPEC: YELLOW — SIGNIFICANT CHANGE UP
CREAT SERPL-MCNC: 1.12 MG/DL — SIGNIFICANT CHANGE UP (ref 0.5–1.3)
EOSINOPHIL # BLD AUTO: 0.08 K/UL — SIGNIFICANT CHANGE UP (ref 0–0.5)
EOSINOPHIL NFR BLD AUTO: 1 % — SIGNIFICANT CHANGE UP (ref 0–6)
GLUCOSE SERPL-MCNC: 105 MG/DL — HIGH (ref 70–99)
GLUCOSE UR-MCNC: NEGATIVE — SIGNIFICANT CHANGE UP
HCT VFR BLD CALC: 30.6 % — LOW (ref 39–50)
HGB BLD-MCNC: 9.7 G/DL — LOW (ref 13–17)
IMM GRANULOCYTES # BLD AUTO: 0.03 # — SIGNIFICANT CHANGE UP
IMM GRANULOCYTES NFR BLD AUTO: 0.4 % — SIGNIFICANT CHANGE UP (ref 0–1.5)
INR BLD: 1.09 — SIGNIFICANT CHANGE UP (ref 0.88–1.17)
KETONES UR-MCNC: NEGATIVE — SIGNIFICANT CHANGE UP
LEUKOCYTE ESTERASE UR-ACNC: HIGH
LYMPHOCYTES # BLD AUTO: 0.78 K/UL — LOW (ref 1–3.3)
LYMPHOCYTES # BLD AUTO: 10.1 % — LOW (ref 13–44)
MCHC RBC-ENTMCNC: 29.7 PG — SIGNIFICANT CHANGE UP (ref 27–34)
MCHC RBC-ENTMCNC: 31.7 % — LOW (ref 32–36)
MCV RBC AUTO: 93.6 FL — SIGNIFICANT CHANGE UP (ref 80–100)
MONOCYTES # BLD AUTO: 0.62 K/UL — SIGNIFICANT CHANGE UP (ref 0–0.9)
MONOCYTES NFR BLD AUTO: 8 % — SIGNIFICANT CHANGE UP (ref 2–14)
MUCOUS THREADS # UR AUTO: SIGNIFICANT CHANGE UP
NEUTROPHILS # BLD AUTO: 6.22 K/UL — SIGNIFICANT CHANGE UP (ref 1.8–7.4)
NEUTROPHILS NFR BLD AUTO: 80.4 % — HIGH (ref 43–77)
NITRITE UR-MCNC: NEGATIVE — SIGNIFICANT CHANGE UP
NRBC # FLD: 0 — SIGNIFICANT CHANGE UP
PH UR: 6 — SIGNIFICANT CHANGE UP (ref 4.6–8)
PLATELET # BLD AUTO: 179 K/UL — SIGNIFICANT CHANGE UP (ref 150–400)
PMV BLD: 11.2 FL — SIGNIFICANT CHANGE UP (ref 7–13)
POTASSIUM SERPL-MCNC: 4.3 MMOL/L — SIGNIFICANT CHANGE UP (ref 3.5–5.3)
POTASSIUM SERPL-SCNC: 4.3 MMOL/L — SIGNIFICANT CHANGE UP (ref 3.5–5.3)
PROT SERPL-MCNC: 6.2 G/DL — SIGNIFICANT CHANGE UP (ref 6–8.3)
PROT UR-MCNC: 100 — SIGNIFICANT CHANGE UP
PROTHROM AB SERPL-ACNC: 12.2 SEC — SIGNIFICANT CHANGE UP (ref 9.8–13.1)
RBC # BLD: 3.27 M/UL — LOW (ref 4.2–5.8)
RBC # FLD: 16.6 % — HIGH (ref 10.3–14.5)
RBC CASTS # UR COMP ASSIST: >50 — HIGH (ref 0–?)
SODIUM SERPL-SCNC: 140 MMOL/L — SIGNIFICANT CHANGE UP (ref 135–145)
SP GR SPEC: 1.01 — SIGNIFICANT CHANGE UP (ref 1–1.03)
UROBILINOGEN FLD QL: 1 E.U. — SIGNIFICANT CHANGE UP (ref 0.1–0.2)
WBC # BLD: 7.74 K/UL — SIGNIFICANT CHANGE UP (ref 3.8–10.5)
WBC # FLD AUTO: 7.74 K/UL — SIGNIFICANT CHANGE UP (ref 3.8–10.5)
WBC UR QL: >50 — HIGH (ref 0–?)

## 2017-08-09 PROCEDURE — 93010 ELECTROCARDIOGRAM REPORT: CPT

## 2017-08-09 PROCEDURE — 93970 EXTREMITY STUDY: CPT | Mod: 26

## 2017-08-09 PROCEDURE — 99285 EMERGENCY DEPT VISIT HI MDM: CPT | Mod: 25

## 2017-08-09 RX ORDER — SODIUM CHLORIDE 9 MG/ML
3 INJECTION INTRAMUSCULAR; INTRAVENOUS; SUBCUTANEOUS ONCE
Qty: 0 | Refills: 0 | Status: DISCONTINUED | OUTPATIENT
Start: 2017-08-09 | End: 2017-08-13

## 2017-08-09 RX ORDER — CEPHALEXIN 500 MG
1 CAPSULE ORAL
Qty: 20 | Refills: 0 | OUTPATIENT
Start: 2017-08-09 | End: 2017-08-19

## 2017-08-09 NOTE — ED PROVIDER NOTE - OBJECTIVE STATEMENT
89 yo M with history of asthma, afib, bladder mass, cva, dementia, penile implant complicated by bleeding not on AC any longer presenting with bilateral lower extremity pain intermittently. Pt minimal ambulatory at baseline. Denies fevers, chills, cough, rhinorrhea, otorrhea, otalgia, nausea, vomiting, constipation, diarrhea, chest pain, shortness of breath or changes in urinary habits. 89 yo M with history of asthma, afib, bladder mass, cva, dementia, penile implant complicated by bleeding not on AC any longer presenting with bilateral lower extremity pain intermittently. Pt minimal ambulatory at baseline. Denies fevers, chills, cough, rhinorrhea, otorrhea, otalgia, nausea, vomiting, constipation, diarrhea, chest pain, shortness of breath or changes in urinary habits.    Attending/Erin: 89 yo M as described above, p/w ~ 2 days of LE swelling. Denies trauma, weakness, SOB, palp, or fever/chills.

## 2017-08-09 NOTE — ED ADULT TRIAGE NOTE - CHIEF COMPLAINT QUOTE
**DOWNTIME TRIAGE NOTE**  BIBA home for c/o right leg numbness. d/c from [facility] Friday for urinary problems / dorantes. 4 blood transfusions while there

## 2017-08-09 NOTE — ED PROVIDER NOTE - PHYSICAL EXAMINATION
Attending/Erin: NAD; PERRL/EOMI, non-icterus, supple, no CAR, no JVD, RRR, CTAB; Abd-soft, NT/ND, no HSM; +dorantes catheter-+ draining; +bilateral LE edema, Rt > Lt; 2 DP/PT

## 2017-08-09 NOTE — ED PROVIDER NOTE - MEDICAL DECISION MAKING DETAILS
89 yo M no longer on AC with LE swelling and pain - currently not in pain - labs, urine, duplex LE to rule out clot

## 2017-08-09 NOTE — ED ADULT NURSE NOTE - OBJECTIVE STATEMENT
Julianne RN: pt sitting quietly with Wife at bedside> Pt calm pleasant affect, axox3. Pt reports "I came tonight because around 7pm my legs started to hurt and felt weak." Pt st" Right now I have no pain." As per wife at bedside. He just was discharged for hospital was admitted and treated for infection following penile implant removal 2 weeks ago....then returned to hospital because the suture site was bleeding he needed 4 units of blood." Pt arrives with indwelling dorantes cath in place draining clear emi urine. vs as noted. Resident Thoppil at bedside. sl and labs to follow. treatment plan to include ultrasound. Pt positioned for comfort. call bell with pt,Instructed to call for asst.  side rails elevated. Ptis fall risk (hx of afib on coumadin uses a walker to walk.)Handoff to Primary RN Jyoti. Julianne RN: pt sitting quietly with Wife at bedside> Pt calm pleasant affect, axox2 (hx of dementia pt is at baseline as per wife.) Pt reports "I came tonight because around 7pm my legs started to hurt and felt weak." Pt st" Right now I have no pain." As per wife at bedside. He just was discharged for hospital was admitted and treated for infection following penile implant removal 2 weeks ago....then returned to hospital because the suture site was bleeding he needed 4 units of blood." Pt arrives with indwelling dorantes cath in place draining clear emi urine. vs as noted. Resident Thoppil at bedside. sl and labs to follow. treatment plan to include ultrasound. Pt positioned for comfort. call bell with pt,Instructed to call for asst.  side rails elevated. Ptis fall risk (hx of afib on coumadin uses a walker to walk.)Handoff to Primary RN Jyoti.

## 2017-08-10 LAB — SPECIMEN SOURCE: SIGNIFICANT CHANGE UP

## 2017-08-12 LAB
-  AMIKACIN: SIGNIFICANT CHANGE UP
-  AMPICILLIN/SULBACTAM: SIGNIFICANT CHANGE UP
-  AMPICILLIN: SIGNIFICANT CHANGE UP
-  AZTREONAM: SIGNIFICANT CHANGE UP
-  CEFAZOLIN: SIGNIFICANT CHANGE UP
-  CEFEPIME: SIGNIFICANT CHANGE UP
-  CEFOXITIN: SIGNIFICANT CHANGE UP
-  CEFTAZIDIME: SIGNIFICANT CHANGE UP
-  CEFTRIAXONE: SIGNIFICANT CHANGE UP
-  CIPROFLOXACIN: SIGNIFICANT CHANGE UP
-  ERTAPENEM: SIGNIFICANT CHANGE UP
-  GENTAMICIN: SIGNIFICANT CHANGE UP
-  IMIPENEM: SIGNIFICANT CHANGE UP
-  LEVOFLOXACIN: SIGNIFICANT CHANGE UP
-  MEROPENEM: SIGNIFICANT CHANGE UP
-  NITROFURANTOIN: SIGNIFICANT CHANGE UP
-  PIPERACILLIN/TAZOBACTAM: SIGNIFICANT CHANGE UP
-  TIGECYCLINE: SIGNIFICANT CHANGE UP
-  TOBRAMYCIN: SIGNIFICANT CHANGE UP
-  TRIMETHOPRIM/SULFAMETHOXAZOLE: SIGNIFICANT CHANGE UP
BACTERIA UR CULT: SIGNIFICANT CHANGE UP
METHOD TYPE: SIGNIFICANT CHANGE UP
ORGANISM # SPEC MICROSCOPIC CNT: SIGNIFICANT CHANGE UP
ORGANISM # SPEC MICROSCOPIC CNT: SIGNIFICANT CHANGE UP

## 2017-08-12 NOTE — ED POST DISCHARGE NOTE - RESULT SUMMARY
UCX: GNR to be IDed > 100,000.  Pt discharged in Keflex 500mg BID x 10 days.  Results prelim.  Follow results to final.

## 2018-02-15 ENCOUNTER — OUTPATIENT (OUTPATIENT)
Dept: OUTPATIENT SERVICES | Facility: HOSPITAL | Age: 83
LOS: 1 days | End: 2018-02-15
Payer: COMMERCIAL

## 2018-02-15 ENCOUNTER — APPOINTMENT (OUTPATIENT)
Dept: UROLOGY | Facility: CLINIC | Age: 83
End: 2018-02-15
Payer: MEDICARE

## 2018-02-15 VITALS
RESPIRATION RATE: 17 BRPM | BODY MASS INDEX: 25.11 KG/M2 | WEIGHT: 160 LBS | HEART RATE: 80 BPM | HEIGHT: 67 IN | DIASTOLIC BLOOD PRESSURE: 98 MMHG | SYSTOLIC BLOOD PRESSURE: 143 MMHG | TEMPERATURE: 98 F

## 2018-02-15 DIAGNOSIS — T83.490A OTHER MECHANICAL COMPLICATION OF IMPLANTED PENILE PROSTHESIS, INITIAL ENCOUNTER: ICD-10-CM

## 2018-02-15 DIAGNOSIS — Z95.0 PRESENCE OF CARDIAC PACEMAKER: Chronic | ICD-10-CM

## 2018-02-15 DIAGNOSIS — Z98.89 OTHER SPECIFIED POSTPROCEDURAL STATES: Chronic | ICD-10-CM

## 2018-02-15 PROCEDURE — 99213 OFFICE O/P EST LOW 20 MIN: CPT | Mod: 25

## 2018-02-15 PROCEDURE — 52000 CYSTOURETHROSCOPY: CPT

## 2018-02-16 PROBLEM — T83.490A EROSION OF PENILE PROSTHESIS: Status: RESOLVED | Noted: 2017-07-21 | Resolved: 2018-02-16

## 2018-02-16 LAB — CORE LAB FLUID CYTOLOGY: NORMAL

## 2018-02-26 DIAGNOSIS — T83.490A OTHER MECHANICAL COMPLICATION OF IMPLANTED PENILE PROSTHESIS, INITIAL ENCOUNTER: ICD-10-CM

## 2018-02-26 DIAGNOSIS — C67.9 MALIGNANT NEOPLASM OF BLADDER, UNSPECIFIED: ICD-10-CM

## 2018-09-13 ENCOUNTER — EMERGENCY (EMERGENCY)
Facility: HOSPITAL | Age: 83
LOS: 1 days | Discharge: HOME | End: 2018-09-13
Attending: EMERGENCY MEDICINE
Payer: COMMERCIAL

## 2018-09-13 VITALS
HEIGHT: 67 IN | WEIGHT: 149.91 LBS | RESPIRATION RATE: 18 BRPM | HEART RATE: 69 BPM | SYSTOLIC BLOOD PRESSURE: 138 MMHG | OXYGEN SATURATION: 97 % | TEMPERATURE: 98 F | DIASTOLIC BLOOD PRESSURE: 86 MMHG

## 2018-09-13 DIAGNOSIS — Z95.0 PRESENCE OF CARDIAC PACEMAKER: Chronic | ICD-10-CM

## 2018-09-13 DIAGNOSIS — Z98.89 OTHER SPECIFIED POSTPROCEDURAL STATES: Chronic | ICD-10-CM

## 2018-09-13 LAB
ALBUMIN SERPL ELPH-MCNC: 3.2 G/DL — LOW (ref 3.3–5)
ALP SERPL-CCNC: 113 U/L — SIGNIFICANT CHANGE UP (ref 40–120)
ALT FLD-CCNC: 10 U/L — SIGNIFICANT CHANGE UP (ref 10–45)
ANION GAP SERPL CALC-SCNC: 9 MMOL/L — SIGNIFICANT CHANGE UP (ref 5–17)
APTT BLD: 34 SEC — SIGNIFICANT CHANGE UP (ref 27.5–37.4)
AST SERPL-CCNC: 19 U/L — SIGNIFICANT CHANGE UP (ref 10–40)
BASOPHILS # BLD AUTO: 0 K/UL — SIGNIFICANT CHANGE UP (ref 0–0.2)
BASOPHILS NFR BLD AUTO: 0.5 % — SIGNIFICANT CHANGE UP (ref 0–2)
BILIRUB SERPL-MCNC: 0.4 MG/DL — SIGNIFICANT CHANGE UP (ref 0.2–1.2)
BLD GP AB SCN SERPL QL: NEGATIVE — SIGNIFICANT CHANGE UP
BUN SERPL-MCNC: 20 MG/DL — SIGNIFICANT CHANGE UP (ref 7–23)
CALCIUM SERPL-MCNC: 9 MG/DL — SIGNIFICANT CHANGE UP (ref 8.4–10.5)
CHLORIDE SERPL-SCNC: 106 MMOL/L — SIGNIFICANT CHANGE UP (ref 96–108)
CO2 SERPL-SCNC: 25 MMOL/L — SIGNIFICANT CHANGE UP (ref 22–31)
CREAT SERPL-MCNC: 1.36 MG/DL — HIGH (ref 0.5–1.3)
EOSINOPHIL # BLD AUTO: 0.1 K/UL — SIGNIFICANT CHANGE UP (ref 0–0.5)
EOSINOPHIL NFR BLD AUTO: 1.9 % — SIGNIFICANT CHANGE UP (ref 0–6)
GLUCOSE SERPL-MCNC: 112 MG/DL — HIGH (ref 70–99)
HCT VFR BLD CALC: 32.6 % — LOW (ref 39–50)
HGB BLD-MCNC: 10.1 G/DL — LOW (ref 13–17)
INR BLD: 1.61 RATIO — HIGH (ref 0.88–1.16)
LYMPHOCYTES # BLD AUTO: 0.7 K/UL — LOW (ref 1–3.3)
LYMPHOCYTES # BLD AUTO: 16.4 % — SIGNIFICANT CHANGE UP (ref 13–44)
MCHC RBC-ENTMCNC: 27.6 PG — SIGNIFICANT CHANGE UP (ref 27–34)
MCHC RBC-ENTMCNC: 31.1 GM/DL — LOW (ref 32–36)
MCV RBC AUTO: 89 FL — SIGNIFICANT CHANGE UP (ref 80–100)
MONOCYTES # BLD AUTO: 0.7 K/UL — SIGNIFICANT CHANGE UP (ref 0–0.9)
MONOCYTES NFR BLD AUTO: 15.6 % — HIGH (ref 2–14)
NEUTROPHILS # BLD AUTO: 2.9 K/UL — SIGNIFICANT CHANGE UP (ref 1.8–7.4)
NEUTROPHILS NFR BLD AUTO: 65.6 % — SIGNIFICANT CHANGE UP (ref 43–77)
PLATELET # BLD AUTO: 165 K/UL — SIGNIFICANT CHANGE UP (ref 150–400)
POTASSIUM SERPL-MCNC: 5.1 MMOL/L — SIGNIFICANT CHANGE UP (ref 3.5–5.3)
POTASSIUM SERPL-SCNC: 5.1 MMOL/L — SIGNIFICANT CHANGE UP (ref 3.5–5.3)
PROT SERPL-MCNC: 7.2 G/DL — SIGNIFICANT CHANGE UP (ref 6–8.3)
PROTHROM AB SERPL-ACNC: 17.6 SEC — HIGH (ref 9.8–12.7)
RBC # BLD: 3.66 M/UL — LOW (ref 4.2–5.8)
RBC # FLD: 14.5 % — SIGNIFICANT CHANGE UP (ref 10.3–14.5)
RH IG SCN BLD-IMP: POSITIVE — SIGNIFICANT CHANGE UP
SODIUM SERPL-SCNC: 140 MMOL/L — SIGNIFICANT CHANGE UP (ref 135–145)
WBC # BLD: 4.5 K/UL — SIGNIFICANT CHANGE UP (ref 3.8–10.5)
WBC # FLD AUTO: 4.5 K/UL — SIGNIFICANT CHANGE UP (ref 3.8–10.5)

## 2018-09-13 PROCEDURE — 51703 INSERT BLADDER CATH COMPLEX: CPT

## 2018-09-13 PROCEDURE — 99284 EMERGENCY DEPT VISIT MOD MDM: CPT

## 2018-09-13 PROCEDURE — 99283 EMERGENCY DEPT VISIT LOW MDM: CPT | Mod: 25

## 2018-09-13 RX ORDER — SODIUM CHLORIDE 9 MG/ML
1000 INJECTION INTRAMUSCULAR; INTRAVENOUS; SUBCUTANEOUS ONCE
Qty: 0 | Refills: 0 | Status: COMPLETED | OUTPATIENT
Start: 2018-09-13 | End: 2018-09-13

## 2018-09-13 RX ADMIN — SODIUM CHLORIDE 1000 MILLILITER(S): 9 INJECTION INTRAMUSCULAR; INTRAVENOUS; SUBCUTANEOUS at 22:38

## 2018-09-13 NOTE — ED PROVIDER NOTE - NS ED ROS FT
Patient denied nausea, vomiting, odynophagia, chest pain, cough, dyspnea, abdominal pain, constipation, diarrhea, samantha-rectal pain, rash, fatigue, headache, depression

## 2018-09-13 NOTE — ED PROVIDER NOTE - OBJECTIVE STATEMENT
89M with PMHx of afib on Eliquis, HTN, HLD, BPH, penile implant (now removed), CVA x 2 presenting with hematuria x 1 day. Endorses started this AM, able to void easily. Went to Urgent Care UA grossly + for RBCs, +leuk, +nitrites. Patient taking Eliquis as directed. Denies fevers, shakes, chills, suprapubic pain, back pain. Has had one prior episode of UTI in the past in where the penile implant is removed.

## 2018-09-13 NOTE — ED PROVIDER NOTE - ATTENDING CONTRIBUTION TO CARE
pt is a 89 male with hematuria sent form urgent care with uti sts, udip with gross blood, noted, pos for infection as well. labs, ua, ivf, voiding spontaneously.

## 2018-09-13 NOTE — ED PROVIDER NOTE - PROGRESS NOTE DETAILS
Patient's UA grossly +, Ceftriaxone 1g IV x 1, zoila blood in urine, IVF, monitor urine for clearance of blood and reassess, once clear or lightened will d/c home with PO keflex Continues to have zoila hematuria. Bedside US evidence of thick clots and debris with >300 retained. Urology consulted for possible CBI. Discussed with urology, yellow urine now draining, will tx with Keflex x 5 days. Urology - Dr. Rdz follow up Otto Shoemaker M.D. Resident: Assumed care of patient at 0700, d/w urology PA, patient OK to be discharged with Dr. Rdz follow up.  of Dr. Rdz will reach out to patient to make an appointment.

## 2018-09-13 NOTE — ED PROVIDER NOTE - PHYSICAL EXAMINATION
· Constitutional        Lying in bed comfortably. No acute distress  · HEENT	               PERRL; EOMI; conjunctiva clear, drooped eyelid  · Neck	               Supple; no JVD  · Back	                ROM intact  · Respiratory             good air movement; no rales; no rhonchi; no wheezes  · Cardiovascular       S1 & S2 with RRR; no murmurs, rales or JVD  · Gastrointestinal     soft; no distention; bowel sounds normal; no rigidity, no suprapubic tenderness, no CVA tenderness  · Extremities             no pedal edema  · Vascular	               Radial Pulse: right normal; left normal  · Neurological           alert and oriented x 3; responds to verbal commands; hard of hearing   · Skin	               warm and dry  · Musculoskeletal    no calf tenderness; diminished strength  · Psychiatric              normal mood and affect

## 2018-09-14 VITALS
DIASTOLIC BLOOD PRESSURE: 77 MMHG | RESPIRATION RATE: 18 BRPM | HEART RATE: 65 BPM | SYSTOLIC BLOOD PRESSURE: 167 MMHG | OXYGEN SATURATION: 99 %

## 2018-09-14 PROBLEM — T83.490A OTHER MECHANICAL COMPLICATION OF IMPLANTED PENILE PROSTHESIS, INITIAL ENCOUNTER: Chronic | Status: ACTIVE | Noted: 2017-07-23

## 2018-09-14 LAB
APPEARANCE UR: ABNORMAL
BACTERIA # UR AUTO: ABNORMAL
BILIRUB UR-MCNC: NEGATIVE — SIGNIFICANT CHANGE UP
COLOR SPEC: SIGNIFICANT CHANGE UP
DIFF PNL FLD: ABNORMAL
EPI CELLS # UR: 0 /HPF — SIGNIFICANT CHANGE UP
GLUCOSE UR QL: NEGATIVE — SIGNIFICANT CHANGE UP
HYALINE CASTS # UR AUTO: 0 /LPF — SIGNIFICANT CHANGE UP (ref 0–2)
KETONES UR-MCNC: NEGATIVE — SIGNIFICANT CHANGE UP
LEUKOCYTE ESTERASE UR-ACNC: ABNORMAL
NITRITE UR-MCNC: NEGATIVE — SIGNIFICANT CHANGE UP
PH UR: 6 — SIGNIFICANT CHANGE UP (ref 5–8)
PROT UR-MCNC: ABNORMAL
RBC CASTS # UR COMP ASSIST: 7085 /HPF — HIGH (ref 0–4)
SP GR SPEC: 1.02 — SIGNIFICANT CHANGE UP (ref 1.01–1.02)
UROBILINOGEN FLD QL: NEGATIVE — SIGNIFICANT CHANGE UP
WBC UR QL: 62 /HPF — HIGH (ref 0–5)

## 2018-09-14 PROCEDURE — 80053 COMPREHEN METABOLIC PANEL: CPT

## 2018-09-14 PROCEDURE — 85730 THROMBOPLASTIN TIME PARTIAL: CPT

## 2018-09-14 PROCEDURE — 87086 URINE CULTURE/COLONY COUNT: CPT

## 2018-09-14 PROCEDURE — 86901 BLOOD TYPING SEROLOGIC RH(D): CPT

## 2018-09-14 PROCEDURE — 81001 URINALYSIS AUTO W/SCOPE: CPT

## 2018-09-14 PROCEDURE — 86900 BLOOD TYPING SEROLOGIC ABO: CPT

## 2018-09-14 PROCEDURE — 87186 SC STD MICRODIL/AGAR DIL: CPT

## 2018-09-14 PROCEDURE — 96361 HYDRATE IV INFUSION ADD-ON: CPT

## 2018-09-14 PROCEDURE — 99284 EMERGENCY DEPT VISIT MOD MDM: CPT | Mod: 25

## 2018-09-14 PROCEDURE — 86850 RBC ANTIBODY SCREEN: CPT

## 2018-09-14 PROCEDURE — 85610 PROTHROMBIN TIME: CPT

## 2018-09-14 PROCEDURE — 85027 COMPLETE CBC AUTOMATED: CPT

## 2018-09-14 PROCEDURE — 96374 THER/PROPH/DIAG INJ IV PUSH: CPT

## 2018-09-14 RX ORDER — CEPHALEXIN 500 MG
1 CAPSULE ORAL
Qty: 10 | Refills: 0 | OUTPATIENT
Start: 2018-09-14 | End: 2018-09-18

## 2018-09-14 RX ORDER — CEFTRIAXONE 500 MG/1
INJECTION, POWDER, FOR SOLUTION INTRAMUSCULAR; INTRAVENOUS
Qty: 0 | Refills: 0 | Status: DISCONTINUED | OUTPATIENT
Start: 2018-09-14 | End: 2018-09-17

## 2018-09-14 RX ORDER — CEFTRIAXONE 500 MG/1
1 INJECTION, POWDER, FOR SOLUTION INTRAMUSCULAR; INTRAVENOUS EVERY 24 HOURS
Qty: 0 | Refills: 0 | Status: DISCONTINUED | OUTPATIENT
Start: 2018-09-15 | End: 2018-09-17

## 2018-09-14 RX ORDER — CEFTRIAXONE 500 MG/1
1 INJECTION, POWDER, FOR SOLUTION INTRAMUSCULAR; INTRAVENOUS ONCE
Qty: 0 | Refills: 0 | Status: COMPLETED | OUTPATIENT
Start: 2018-09-14 | End: 2018-09-14

## 2018-09-14 RX ORDER — SODIUM CHLORIDE 9 MG/ML
1000 INJECTION INTRAMUSCULAR; INTRAVENOUS; SUBCUTANEOUS ONCE
Qty: 0 | Refills: 0 | Status: COMPLETED | OUTPATIENT
Start: 2018-09-14 | End: 2018-09-14

## 2018-09-14 RX ADMIN — CEFTRIAXONE 100 GRAM(S): 500 INJECTION, POWDER, FOR SOLUTION INTRAMUSCULAR; INTRAVENOUS at 02:00

## 2018-09-14 RX ADMIN — SODIUM CHLORIDE 1000 MILLILITER(S): 9 INJECTION INTRAMUSCULAR; INTRAVENOUS; SUBCUTANEOUS at 00:00

## 2018-09-14 RX ADMIN — SODIUM CHLORIDE 500 MILLILITER(S): 9 INJECTION INTRAMUSCULAR; INTRAVENOUS; SUBCUTANEOUS at 02:00

## 2018-09-14 NOTE — ED ADULT NURSE REASSESSMENT NOTE - NS ED NURSE REASSESS COMMENT FT1
MD at bedside for bladder scan.   Fould blood clots and paged urology.  Pending urology consult.  Safety and comfort maintained. Will continue to monitor.

## 2018-09-14 NOTE — CONSULT NOTE ADULT - ASSESSMENT
Assessment  Episode of gross hematuria  Bladder was irrigated with 6-eye catheter draining yellow urine      Plan:  Urine culture  Antibiotics for UTI  Follow up with Dr Rdz as outpatient

## 2018-09-14 NOTE — CONSULT NOTE ADULT - SUBJECTIVE AND OBJECTIVE BOX
89yMale with PMH of HTN, A.fib, CVA, BPH, bladder ca, s/p penile prosthesis removal in 2017, presents to ER complaining of blood in the urine. patient reports first seeing blood 2 days ago, states it was bright red color. Patient reports being able to urinate freely, states has some burning with urination. Denies fever, chills, dizziness, chest pain, SOB, abdominal or pelvic pain, pain in extremities.     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  BPH (benign prostatic hypertrophy)  Hematuria  Asthma, mild intermittent: last use of rescue inhaler 2 weeks ago  Dyslipidemia  HTN (hypertension), benign  Atrial fibrillation: diagnosed 4 years ago treated medically, on coumadin  History of permanent cardiac pacemaker placement  S/P TURP      MEDICATIONS  (STANDING):  cefTRIAXone   IVPB        MEDICATIONS  (PRN):      FAMILY HISTORY:  Family history of breast cancer in sister (Sibling)  Family history of type II diabetes mellitus  Family history of myocardial infarction      Allergies    No Known Allergies    Intolerances        SOCIAL HISTORY:    REVIEW OF SYSTEMS: Otherwise negative as stated in HPI    Physical Exam  Vital signs  T(C): 36.9 (18 @ 06:45), Max: 36.9 (18 @ 19:55)  HR: 62 (18 @ 06:45)  BP: 156/77 (18 @ 06:45)  SpO2: 99% (18 @ 06:45)  Wt(kg): --    Output    UOP    Gen:  AWAKE ALERT NAD AXOX3    Pulm:  NO RESP DISTRESS  	  CV:  S1S2    GI:  SOFT NT/ND    :  Normal  exam: circumcised penis, atrophic testes b/l.    	Bladder was irrigated with 6-eye catheter draining yellow urine                        	      LABS:                          10.1   4.5   )-----------( 165      ( 13 Sep 2018 22:55 )             32.6       -    140  |  106  |  20  ----------------------------<  112<H>  5.1   |  25  |  1.36<H>    Ca    9.0      13 Sep 2018 22:55    TPro  7.2  /  Alb  3.2<L>  /  TBili  0.4  /  DBili  x   /  AST  19  /  ALT  10  /  AlkPhos  113  09-13    PT/INR - ( 13 Sep 2018 22:55 )   PT: 17.6 sec;   INR: 1.61 ratio         PTT - ( 13 Sep 2018 22:55 )  PTT:34.0 sec  Urinalysis Basic - ( 14 Sep 2018 00:57 )    Color: DARK BROWN / Appearance: Turbid / S.022 / pH: x  Gluc: x / Ketone: Negative  / Bili: Negative / Urobili: Negative   Blood: x / Protein: 100 mg/dL / Nitrite: Negative   Leuk Esterase: Large / RBC: 7085 /hpf / WBC 62 /hpf   Sq Epi: x / Non Sq Epi: 0 /hpf / Bacteria: Moderate        Urine Cx: pending

## 2018-09-14 NOTE — ED ADULT NURSE NOTE - OBJECTIVE STATEMENT
89 yo M pnh of HTN, CVA, afib, BPH, on eliquis, came to ED c/o hematuria x 1 day. pt states that he has not difficulty with voiding, and had gone to urgent care prior to coming to ED.  Denies chest pain, back pain, SOB, fevers/chills, /n/v/d, lightheadedness, dizziness, changes in bowel habits.  A&Ox4, abdomen soft, nondistended, nontender.  Urine red and cloudy.  Safety and comfort maintained. Will continue to monitor.  Family present at bedside.

## 2018-09-14 NOTE — PROCEDURE NOTE - NSURITECHNIQUE_GU_A_CORE
The catheter was appropriately lubricated/Sterile gloves were worn for the duration of the procedure/A sterile drape was used to cover all adjacent areas/Proper hand hygiene was performed/The site was cleaned with soap/water and sterile solution (betadine)/All applicable medical record documentation is completed

## 2018-09-14 NOTE — ED ADULT NURSE REASSESSMENT NOTE - NS ED NURSE REASSESS COMMENT FT1
Pt received 0700 from [facility]. Pt is resting in bed comfortably at this time awaiting urology. VSS/ NAD. He is A&O x3 and well appearing. Safety and comfort maintained. Will continue to monitor.

## 2018-09-16 LAB
-  AMIKACIN: SIGNIFICANT CHANGE UP
-  AMOXICILLIN/CLAVULANIC ACID: SIGNIFICANT CHANGE UP
-  AMPICILLIN/SULBACTAM: SIGNIFICANT CHANGE UP
-  AMPICILLIN: SIGNIFICANT CHANGE UP
-  AZTREONAM: SIGNIFICANT CHANGE UP
-  CEFAZOLIN: SIGNIFICANT CHANGE UP
-  CEFEPIME: SIGNIFICANT CHANGE UP
-  CEFOXITIN: SIGNIFICANT CHANGE UP
-  CEFTRIAXONE: SIGNIFICANT CHANGE UP
-  CIPROFLOXACIN: SIGNIFICANT CHANGE UP
-  ERTAPENEM: SIGNIFICANT CHANGE UP
-  GENTAMICIN: SIGNIFICANT CHANGE UP
-  IMIPENEM: SIGNIFICANT CHANGE UP
-  LEVOFLOXACIN: SIGNIFICANT CHANGE UP
-  MEROPENEM: SIGNIFICANT CHANGE UP
-  NITROFURANTOIN: SIGNIFICANT CHANGE UP
-  PIPERACILLIN/TAZOBACTAM: SIGNIFICANT CHANGE UP
-  TIGECYCLINE: SIGNIFICANT CHANGE UP
-  TOBRAMYCIN: SIGNIFICANT CHANGE UP
-  TRIMETHOPRIM/SULFAMETHOXAZOLE: SIGNIFICANT CHANGE UP
METHOD TYPE: SIGNIFICANT CHANGE UP

## 2018-09-16 NOTE — ED POST DISCHARGE NOTE - DETAILS
changed antibiotic to bactrim after discussion with urology PA. discussed with patient and wife regarding results and advised to d/c keflex and change to bactrim. understanding and agreement verbalized. -Erlinda Islas PA-C

## 2018-09-16 NOTE — ED POST DISCHARGE NOTE - OTHER COMMUNICATION
Prelim cx as above. Pt discharged on Keflex. Will await final sensitivities to determine need for contact vs appropriate care given. - Michael Valdez PA-C

## 2018-09-17 LAB
-  AMIKACIN: SIGNIFICANT CHANGE UP
-  AMOXICILLIN/CLAVULANIC ACID: SIGNIFICANT CHANGE UP
-  AMPICILLIN/SULBACTAM: SIGNIFICANT CHANGE UP
-  AMPICILLIN: SIGNIFICANT CHANGE UP
-  AZTREONAM: SIGNIFICANT CHANGE UP
-  CEFAZOLIN: SIGNIFICANT CHANGE UP
-  CEFEPIME: SIGNIFICANT CHANGE UP
-  CEFOTAXIME: SIGNIFICANT CHANGE UP
-  CEFOXITIN: SIGNIFICANT CHANGE UP
-  CEFTAZIDIME: SIGNIFICANT CHANGE UP
-  CEFTRIAXONE: SIGNIFICANT CHANGE UP
-  CEFUROXIME: SIGNIFICANT CHANGE UP
-  CEPHALOTHIN: SIGNIFICANT CHANGE UP
-  CIPROFLOXACIN: SIGNIFICANT CHANGE UP
-  ERTAPENEM: SIGNIFICANT CHANGE UP
-  GENTAMICIN: SIGNIFICANT CHANGE UP
-  IMIPENEM: SIGNIFICANT CHANGE UP
-  LEVOFLOXACIN: SIGNIFICANT CHANGE UP
-  MEROPENEM: SIGNIFICANT CHANGE UP
-  NITROFURANTOIN: SIGNIFICANT CHANGE UP
-  PIPERACILLIN/TAZOBACTAM: SIGNIFICANT CHANGE UP
-  TETRACYCLINE: SIGNIFICANT CHANGE UP
-  TIGECYCLINE: SIGNIFICANT CHANGE UP
-  TOBRAMYCIN: SIGNIFICANT CHANGE UP
-  TRIMETHOPRIM/SULFAMETHOXAZOLE: SIGNIFICANT CHANGE UP
CULTURE RESULTS: SIGNIFICANT CHANGE UP
METHOD TYPE: SIGNIFICANT CHANGE UP
ORGANISM # SPEC MICROSCOPIC CNT: SIGNIFICANT CHANGE UP
SPECIMEN SOURCE: SIGNIFICANT CHANGE UP

## 2018-09-17 RX ORDER — AZTREONAM 2 G
1 VIAL (EA) INJECTION
Qty: 20 | Refills: 0 | OUTPATIENT
Start: 2018-09-17 | End: 2018-09-26

## 2018-09-21 ENCOUNTER — APPOINTMENT (OUTPATIENT)
Dept: UROLOGY | Facility: CLINIC | Age: 83
End: 2018-09-21
Payer: MEDICARE

## 2018-09-21 DIAGNOSIS — R31.0 GROSS HEMATURIA: ICD-10-CM

## 2018-09-21 PROCEDURE — 52000 CYSTOURETHROSCOPY: CPT

## 2018-09-21 PROCEDURE — 99213 OFFICE O/P EST LOW 20 MIN: CPT | Mod: 25

## 2018-09-27 ENCOUNTER — APPOINTMENT (OUTPATIENT)
Dept: UROLOGY | Facility: CLINIC | Age: 83
End: 2018-09-27

## 2018-11-02 ENCOUNTER — APPOINTMENT (OUTPATIENT)
Dept: UROLOGY | Facility: CLINIC | Age: 83
End: 2018-11-02
Payer: MEDICARE

## 2018-11-02 DIAGNOSIS — Z87.440 PERSONAL HISTORY OF URINARY (TRACT) INFECTIONS: ICD-10-CM

## 2018-11-02 DIAGNOSIS — Z87.891 PERSONAL HISTORY OF NICOTINE DEPENDENCE: ICD-10-CM

## 2018-11-02 PROCEDURE — 99213 OFFICE O/P EST LOW 20 MIN: CPT

## 2018-11-05 LAB — BACTERIA UR CULT: NORMAL

## 2018-11-09 ENCOUNTER — APPOINTMENT (OUTPATIENT)
Dept: UROLOGY | Facility: CLINIC | Age: 83
End: 2018-11-09

## 2018-12-14 ENCOUNTER — APPOINTMENT (OUTPATIENT)
Dept: UROLOGY | Facility: CLINIC | Age: 83
End: 2018-12-14

## 2019-01-03 ENCOUNTER — OUTPATIENT (OUTPATIENT)
Dept: OUTPATIENT SERVICES | Facility: HOSPITAL | Age: 84
LOS: 1 days | End: 2019-01-03
Payer: COMMERCIAL

## 2019-01-03 ENCOUNTER — APPOINTMENT (OUTPATIENT)
Dept: UROLOGY | Facility: CLINIC | Age: 84
End: 2019-01-03
Payer: MEDICARE

## 2019-01-03 VITALS
SYSTOLIC BLOOD PRESSURE: 101 MMHG | TEMPERATURE: 98.1 F | DIASTOLIC BLOOD PRESSURE: 64 MMHG | RESPIRATION RATE: 16 BRPM | HEART RATE: 89 BPM

## 2019-01-03 DIAGNOSIS — Z95.0 PRESENCE OF CARDIAC PACEMAKER: Chronic | ICD-10-CM

## 2019-01-03 DIAGNOSIS — Z98.89 OTHER SPECIFIED POSTPROCEDURAL STATES: Chronic | ICD-10-CM

## 2019-01-03 PROCEDURE — 52000 CYSTOURETHROSCOPY: CPT

## 2019-01-03 PROCEDURE — 99213 OFFICE O/P EST LOW 20 MIN: CPT | Mod: 25

## 2019-01-06 LAB — BACTERIA UR CULT: NORMAL

## 2019-01-08 DIAGNOSIS — C67.9 MALIGNANT NEOPLASM OF BLADDER, UNSPECIFIED: ICD-10-CM

## 2019-02-09 ENCOUNTER — MESSAGE (OUTPATIENT)
Age: 84
End: 2019-02-09

## 2019-02-10 ENCOUNTER — TRANSCRIPTION ENCOUNTER (OUTPATIENT)
Age: 84
End: 2019-02-10

## 2019-02-20 ENCOUNTER — OUTPATIENT (OUTPATIENT)
Dept: OUTPATIENT SERVICES | Facility: HOSPITAL | Age: 84
LOS: 1 days | End: 2019-02-20
Payer: MEDICARE

## 2019-02-20 VITALS
TEMPERATURE: 98 F | SYSTOLIC BLOOD PRESSURE: 140 MMHG | HEART RATE: 64 BPM | WEIGHT: 156.09 LBS | DIASTOLIC BLOOD PRESSURE: 80 MMHG | RESPIRATION RATE: 16 BRPM | HEIGHT: 66.5 IN

## 2019-02-20 DIAGNOSIS — Z95.0 PRESENCE OF CARDIAC PACEMAKER: Chronic | ICD-10-CM

## 2019-02-20 DIAGNOSIS — R06.02 SHORTNESS OF BREATH: ICD-10-CM

## 2019-02-20 DIAGNOSIS — R06.83 SNORING: ICD-10-CM

## 2019-02-20 DIAGNOSIS — Z98.89 OTHER SPECIFIED POSTPROCEDURAL STATES: Chronic | ICD-10-CM

## 2019-02-20 DIAGNOSIS — C67.9 MALIGNANT NEOPLASM OF BLADDER, UNSPECIFIED: ICD-10-CM

## 2019-02-20 DIAGNOSIS — N32.89 OTHER SPECIFIED DISORDERS OF BLADDER: ICD-10-CM

## 2019-02-20 DIAGNOSIS — D49.4 NEOPLASM OF UNSPECIFIED BEHAVIOR OF BLADDER: Chronic | ICD-10-CM

## 2019-02-20 DIAGNOSIS — Q15.9 CONGENITAL MALFORMATION OF EYE, UNSPECIFIED: Chronic | ICD-10-CM

## 2019-02-20 LAB
ALBUMIN SERPL ELPH-MCNC: 3.6 G/DL — SIGNIFICANT CHANGE UP (ref 3.3–5)
ALP SERPL-CCNC: 100 U/L — SIGNIFICANT CHANGE UP (ref 40–120)
ALT FLD-CCNC: 9 U/L — SIGNIFICANT CHANGE UP (ref 4–41)
ANION GAP SERPL CALC-SCNC: 12 MMO/L — SIGNIFICANT CHANGE UP (ref 7–14)
APPEARANCE UR: SIGNIFICANT CHANGE UP
AST SERPL-CCNC: 10 U/L — SIGNIFICANT CHANGE UP (ref 4–40)
BACTERIA # UR AUTO: NEGATIVE — SIGNIFICANT CHANGE UP
BILIRUB SERPL-MCNC: 0.3 MG/DL — SIGNIFICANT CHANGE UP (ref 0.2–1.2)
BILIRUB UR-MCNC: NEGATIVE — SIGNIFICANT CHANGE UP
BLOOD UR QL VISUAL: HIGH
BUN SERPL-MCNC: 24 MG/DL — HIGH (ref 7–23)
CALCIUM SERPL-MCNC: 9.2 MG/DL — SIGNIFICANT CHANGE UP (ref 8.4–10.5)
CHLORIDE SERPL-SCNC: 106 MMOL/L — SIGNIFICANT CHANGE UP (ref 98–107)
CO2 SERPL-SCNC: 26 MMOL/L — SIGNIFICANT CHANGE UP (ref 22–31)
COLOR SPEC: YELLOW — SIGNIFICANT CHANGE UP
CREAT SERPL-MCNC: 1.12 MG/DL — SIGNIFICANT CHANGE UP (ref 0.5–1.3)
GLUCOSE SERPL-MCNC: 93 MG/DL — SIGNIFICANT CHANGE UP (ref 70–99)
GLUCOSE UR-MCNC: NEGATIVE — SIGNIFICANT CHANGE UP
HCT VFR BLD CALC: 35 % — LOW (ref 39–50)
HGB BLD-MCNC: 9.9 G/DL — LOW (ref 13–17)
HYALINE CASTS # UR AUTO: NEGATIVE — SIGNIFICANT CHANGE UP
KETONES UR-MCNC: NEGATIVE — SIGNIFICANT CHANGE UP
LEUKOCYTE ESTERASE UR-ACNC: NEGATIVE — SIGNIFICANT CHANGE UP
MCHC RBC-ENTMCNC: 26 PG — LOW (ref 27–34)
MCHC RBC-ENTMCNC: 28.3 % — LOW (ref 32–36)
MCV RBC AUTO: 91.9 FL — SIGNIFICANT CHANGE UP (ref 80–100)
NITRITE UR-MCNC: NEGATIVE — SIGNIFICANT CHANGE UP
NRBC # FLD: 0 K/UL — LOW (ref 25–125)
PH UR: 6 — SIGNIFICANT CHANGE UP (ref 5–8)
PLATELET # BLD AUTO: 155 K/UL — SIGNIFICANT CHANGE UP (ref 150–400)
PMV BLD: 11.5 FL — SIGNIFICANT CHANGE UP (ref 7–13)
POTASSIUM SERPL-MCNC: 3.8 MMOL/L — SIGNIFICANT CHANGE UP (ref 3.5–5.3)
POTASSIUM SERPL-SCNC: 3.8 MMOL/L — SIGNIFICANT CHANGE UP (ref 3.5–5.3)
PROT SERPL-MCNC: 8 G/DL — SIGNIFICANT CHANGE UP (ref 6–8.3)
PROT UR-MCNC: 70 — SIGNIFICANT CHANGE UP
RBC # BLD: 3.81 M/UL — LOW (ref 4.2–5.8)
RBC # FLD: 17.1 % — HIGH (ref 10.3–14.5)
RBC CASTS # UR COMP ASSIST: >50 — HIGH (ref 0–?)
SODIUM SERPL-SCNC: 144 MMOL/L — SIGNIFICANT CHANGE UP (ref 135–145)
SP GR SPEC: 1.02 — SIGNIFICANT CHANGE UP (ref 1–1.04)
SQUAMOUS # UR AUTO: SIGNIFICANT CHANGE UP
UROBILINOGEN FLD QL: NORMAL — SIGNIFICANT CHANGE UP
WBC # BLD: 3.46 K/UL — LOW (ref 3.8–10.5)
WBC # FLD AUTO: 3.46 K/UL — LOW (ref 3.8–10.5)
WBC UR QL: HIGH (ref 0–?)

## 2019-02-20 PROCEDURE — 93010 ELECTROCARDIOGRAM REPORT: CPT

## 2019-02-20 RX ORDER — AMIODARONE HYDROCHLORIDE 400 MG/1
1 TABLET ORAL
Qty: 36 | Refills: 0 | COMMUNITY

## 2019-02-20 RX ORDER — DONEPEZIL HYDROCHLORIDE 10 MG/1
1 TABLET, FILM COATED ORAL
Qty: 0 | Refills: 0 | COMMUNITY

## 2019-02-20 RX ORDER — SODIUM CHLORIDE 9 MG/ML
1000 INJECTION, SOLUTION INTRAVENOUS
Qty: 0 | Refills: 0 | Status: DISCONTINUED | OUTPATIENT
Start: 2019-03-04 | End: 2019-03-05

## 2019-02-20 NOTE — H&P PST ADULT - PMH
Asthma, mild intermittent  last use of rescue inhaler 2 weeks ago  Atrial fibrillation  diagnosed approximately 2008,  treated medically, on coumadin  Bladder mass    BPH (benign prostatic hypertrophy)    CVA (cerebral infarction)  9/2012 with visually disturbances  Dementia    Dyslipidemia    Erosion of penile prosthesis    Hematuria    HTN (hypertension), benign    Hypertension    Irregular heart beat  Pacemaker inserted Alcohol abuse  quit in 2012 after first CVA -- had been heavy drinker  Asthma, mild intermittent  last use of rescue inhaler 2 weeks ago  Atrial fibrillation  diagnosed approximately 2008,  treated medically, on coumadin  Bladder mass    BPH (benign prostatic hypertrophy)    CVA (cerebral infarction)  9/2012 with visually disturbances  Dementia    Dyslipidemia    Erosion of penile prosthesis    Hematuria    HTN (hypertension), benign    Hypertension    Irregular heart beat  Pacemaker inserted  Snoring  MAYA precautions -- responds affirmatively to STOP BANG questionnaire Alcohol abuse  quit in 2012 after first CVA -- had been heavy drinker  Asthma, mild intermittent  last use of rescue inhaler 2 weeks ago  Atrial fibrillation  diagnosed approximately 2008,  treated medically, on coumadin  Bladder mass    BPH (benign prostatic hypertrophy)    CVA (cerebral infarction)  9/2012 with visually disturbances  Dementia    Dyslipidemia    Erosion of penile prosthesis    Hearing loss  bilateral  Hematuria    HTN (hypertension), benign    Hypertension    Irregular heart beat  Pacemaker inserted  Snoring  MAYA precautions -- responds affirmatively to STOP BANG questionnaire Alcohol abuse  quit in 2012 after first CVA -- had been heavy drinker  Asthma, mild intermittent  last use of rescue inhaler 2 weeks ago  Atrial fibrillation  diagnosed approximately 2008,  treated medically, on coumadin  Bladder mass    BPH (benign prostatic hypertrophy)    CVA (cerebral infarction)  9/2012 with visually disturbances  Dementia    Dyslipidemia    Erosion of penile prosthesis    Hearing loss  bilateral  Hematuria    HTN (hypertension), benign    Hypertension    Irregular heart beat  Pacemaker inserted  Snoring  MAYA precautions -- responds affirmatively to STOP BANG questionnaire  UTI (urinary tract infection)  as of 2-20-19, patient on cipro Alcohol abuse  quit in 2012 after first CVA -- had been heavy drinker  Asthma, mild intermittent  last use of rescue inhaler 2 weeks ago  Atrial fibrillation  diagnosed approximately 2008,  treated medically, on coumadin  Bladder mass    BPH (benign prostatic hypertrophy)    CVA (cerebral infarction)  9/2012 with visually disturbances  Dementia    Dyslipidemia    Erosion of penile prosthesis    Hearing loss  bilateral  Hematuria    HTN (hypertension), benign    Hypertension    Irregular heart beat  Pacemaker inserted  Murmur, cardiac    Snoring  MAYA precautions -- responds affirmatively to STOP BANG questionnaire  UTI (urinary tract infection)  as of 2-20-19, patient on cipro Alcohol abuse  quit in 2012 after first CVA -- had been heavy drinker  Asthma, mild intermittent  last use of inhalers greater than 6 months ago  Atrial fibrillation  diagnosed approximately 2008,  treated medically, on coumadin  Bladder mass    BPH (benign prostatic hypertrophy)    CVA (cerebral infarction)  9/2012 with visually disturbances  Dementia    Dyslipidemia    Erosion of penile prosthesis    Hearing loss  bilateral  Hematuria    HTN (hypertension), benign    Hypertension    Irregular heart beat  Pacemaker inserted  Memory loss    Murmur, cardiac    Snoring  MAYA precautions -- responds affirmatively to STOP BANG questionnaire  UTI (urinary tract infection)  as of 2-20-19, patient on cipro Alcohol abuse  quit in 2012 after first CVA -- had been heavy drinker  Asthma, mild intermittent  last use of inhalers greater than 6 months ago  Atrial fibrillation  diagnosed approximately 2008,  treated medically, on coumadin  Bladder mass    BPH (benign prostatic hypertrophy)    CVA (cerebral infarction)  9/2012 with visually disturbances  Dementia    Dyslipidemia    Erosion of penile prosthesis    Hearing loss  bilateral  Hematuria    HTN (hypertension), benign    Hypertension    Irregular heart beat  Pacemaker--Medtronic placed 10-16-15; model #A3SR01; serial number [id number]  Memory loss    Murmur, cardiac    Snoring  MAYA precautions -- responds affirmatively to STOP BANG questionnaire  UTI (urinary tract infection)  as of 2-20-19, patient on cipro Alcohol abuse  quit in 2012 after first CVA -- had been heavy drinker  Asthma, mild intermittent  last use of inhalers greater than 6 months ago  Atrial fibrillation  diagnosed approximately 2008,  treated medically, on coumadin  Bladder mass    BPH (benign prostatic hypertrophy)    CVA (cerebral infarction)  9/2012 with visually disturbances  Dementia    Dyslipidemia    Erosion of penile prosthesis    Hearing loss  bilateral  Hematuria    HTN (hypertension), benign    Hypertension    Irregular heart beat  Pacemaker--Medtronic (advisa) placed 10-16-15; model #A3SR01; serial number [id number]  Memory loss    Murmur, cardiac    Snoring  MAYA precautions -- responds affirmatively to STOP BANG questionnaire  UTI (urinary tract infection)  as of 2-20-19, patient on cipro

## 2019-02-20 NOTE — H&P PST ADULT - EYES COMMENTS
visual disturbances since CVA in 2012 visual disturbances since CVA in 2012; left eye ptosis after eye surgery NOT from CVA

## 2019-02-20 NOTE — H&P PST ADULT - GENITOURINARY COMMENTS
bladder tumor bladder tumor; as of 2-20-19, patient on cipro bladder tumor; as of 2-20-19, patient on Cipro for UTI

## 2019-02-20 NOTE — H&P PST ADULT - LAST STRESS TEST
2/2014- normal myocardial perfusion scan with no evidence of infarction or inducible ischemia through cardiologist in 2017

## 2019-02-20 NOTE — H&P PST ADULT - HISTORY OF PRESENT ILLNESS
This is a 89 y/o M PMH HTN, dyslipidemia, atrial fibrillation (on Eliquis), CVA with prior h/o bladder tumor. Followed with serial testing. Recent cystoscopy noted abnormality. Intervention warranted. Scheduled or cystoscopy TURBT on 3-4-19

## 2019-02-20 NOTE — H&P PST ADULT - PSH
History of permanent cardiac pacemaker placement  placed approximately 2015  S/P SUDHAP Bladder tumor  cysto, TURBT  History of permanent cardiac pacemaker placement  placed approximately 2015  S/P TURP Bladder tumor  cysto, TURBT  Eye abnormality  post surgery had ptosis left eye (ptosis NOT from CVA)  History of permanent cardiac pacemaker placement  placed approximately 2015  S/P TURP Bladder tumor  cysto, TURBT  Eye abnormality  post surgery had ptosis left eye (ptosis NOT from CVA)  History of permanent cardiac pacemaker placement  Medtronic placed 10-16-15; model # A3SR01; serial number [id number]  S/P TURP Bladder tumor  cysto, TURBT  Eye abnormality  post surgery had ptosis left eye (ptosis NOT from CVA)  History of permanent cardiac pacemaker placement  Medtronic (advisa) placed 10-16-15; model # A3SR01; serial number [id number]  S/P TURP Bladder tumor  cysto, TURBT  Eye abnormality  post surgery had ptosis left eye (ptosis NOT from CVA)  History of permanent cardiac pacemaker placement  Medtronic (advisa) placed 10-16-15; model # A3SR01; serial number [id number]  Pain  penile prosthesis removed due to erosion  S/P TURP

## 2019-02-20 NOTE — H&P PST ADULT - PROBLEM SELECTOR PLAN 3
Await cardiac evaluation due to h/o METS less than 4 with h/o htn and pacemaker  * Await pacemaker interrogation with cardiologist  * Await stress test done in approximately 2017  * Cardiologist to determine when or if to stop eliquis due to h/o CVA in 2012 and atrial fibrillation history  * Instructed to take normal am dose of Metroprolol, Cipro, amiodarone, pantoprazole, montelukast, and furosemide the am of surgery Await cardiac evaluation due to h/o METS less than 4 with h/o htn and pacemaker  * Await pacemaker interrogation with cardiologist  * Await stress test done in approximately 2017  * Cardiologist to determine when or if to stop Eliquis due to h/o CVA in 2012 and atrial fibrillation history  * Notified OR booking via fax of pacemaker and penile prosthesis  * Instructed to take normal am dose of Metroprolol, Cipro, amiodarone, pantoprazole, montelukast, and furosemide the am of surgery Await cardiac evaluation due to h/o METS less than 4 with h/o htn and pacemaker  * Await pacemaker interrogation with cardiologist  * Await stress test done in approximately 2017  * Cardiologist to determine when or if to stop Eliquis due to h/o CVA in 2012 and atrial fibrillation history  * Notified OR booking via fax of pacemaker  * Instructed to take normal am dose of Metroprolol, Cipro, amiodarone, pantoprazole, montelukast, and furosemide the am of surgery  ADDENDUM: on 2-21-19, wife spoke to NP at [facility]. Patient saw cardiologist and MD instructed patient to stop Eliquis 3 days before surgery and to restart right after surgery done

## 2019-02-20 NOTE — H&P PST ADULT - BACK
----- Message from Surya Carroll sent at 7/27/2018  1:19 PM CDT -----  Type: Needs Medical Advice    Who Called:  Patient  Best Call Back Number: [phone]  Additional Information: Patient needs to reschedule procedure scheduled for 8.6.18 - Please call.     detailed exam

## 2019-02-20 NOTE — H&P PST ADULT - FAMILY HISTORY
Family history of myocardial infarction     Family history of type II diabetes mellitus     Sibling  Still living? Unknown  Family history of breast cancer in sister, Age at diagnosis: Age Unknown

## 2019-02-20 NOTE — H&P PST ADULT - HEALTH CARE MAINTENANCE
PMD fcomprehensive physical - 2014  f/u with cardiologist at least annually  denies any h/o colonoscopy   declines influenza/pneumovax

## 2019-02-21 DIAGNOSIS — R52 PAIN, UNSPECIFIED: Chronic | ICD-10-CM

## 2019-02-22 PROBLEM — I49.9 CARDIAC ARRHYTHMIA, UNSPECIFIED: Chronic | Status: ACTIVE | Noted: 2019-02-20

## 2019-02-22 LAB
BACTERIA UR CULT: SIGNIFICANT CHANGE UP
SPECIMEN SOURCE: SIGNIFICANT CHANGE UP

## 2019-03-03 ENCOUNTER — TRANSCRIPTION ENCOUNTER (OUTPATIENT)
Age: 84
End: 2019-03-03

## 2019-03-04 ENCOUNTER — INPATIENT (INPATIENT)
Facility: HOSPITAL | Age: 84
LOS: 0 days | Discharge: HOME | End: 2019-03-05
Attending: UROLOGY | Admitting: UROLOGY
Payer: MEDICARE

## 2019-03-04 ENCOUNTER — APPOINTMENT (OUTPATIENT)
Dept: UROLOGY | Facility: HOSPITAL | Age: 84
End: 2019-03-04

## 2019-03-04 ENCOUNTER — RESULT REVIEW (OUTPATIENT)
Age: 84
End: 2019-03-04

## 2019-03-04 VITALS
WEIGHT: 156.09 LBS | TEMPERATURE: 98 F | DIASTOLIC BLOOD PRESSURE: 84 MMHG | OXYGEN SATURATION: 98 % | SYSTOLIC BLOOD PRESSURE: 153 MMHG | RESPIRATION RATE: 15 BRPM | HEIGHT: 66.5 IN | HEART RATE: 79 BPM

## 2019-03-04 DIAGNOSIS — D49.4 NEOPLASM OF UNSPECIFIED BEHAVIOR OF BLADDER: Chronic | ICD-10-CM

## 2019-03-04 DIAGNOSIS — N40.0 BENIGN PROSTATIC HYPERPLASIA WITHOUT LOWER URINARY TRACT SYMPTOMS: ICD-10-CM

## 2019-03-04 DIAGNOSIS — I48.91 UNSPECIFIED ATRIAL FIBRILLATION: ICD-10-CM

## 2019-03-04 DIAGNOSIS — Q15.9 CONGENITAL MALFORMATION OF EYE, UNSPECIFIED: Chronic | ICD-10-CM

## 2019-03-04 DIAGNOSIS — Z95.0 PRESENCE OF CARDIAC PACEMAKER: Chronic | ICD-10-CM

## 2019-03-04 DIAGNOSIS — C67.9 MALIGNANT NEOPLASM OF BLADDER, UNSPECIFIED: ICD-10-CM

## 2019-03-04 DIAGNOSIS — F03.90 UNSPECIFIED DEMENTIA WITHOUT BEHAVIORAL DISTURBANCE: ICD-10-CM

## 2019-03-04 DIAGNOSIS — I10 ESSENTIAL (PRIMARY) HYPERTENSION: ICD-10-CM

## 2019-03-04 DIAGNOSIS — Z98.89 OTHER SPECIFIED POSTPROCEDURAL STATES: Chronic | ICD-10-CM

## 2019-03-04 DIAGNOSIS — Z29.9 ENCOUNTER FOR PROPHYLACTIC MEASURES, UNSPECIFIED: ICD-10-CM

## 2019-03-04 DIAGNOSIS — R52 PAIN, UNSPECIFIED: Chronic | ICD-10-CM

## 2019-03-04 PROCEDURE — 88307 TISSUE EXAM BY PATHOLOGIST: CPT | Mod: 26

## 2019-03-04 PROCEDURE — 88342 IMHCHEM/IMCYTCHM 1ST ANTB: CPT | Mod: 26

## 2019-03-04 PROCEDURE — 99223 1ST HOSP IP/OBS HIGH 75: CPT

## 2019-03-04 RX ORDER — PANTOPRAZOLE SODIUM 20 MG/1
40 TABLET, DELAYED RELEASE ORAL
Qty: 0 | Refills: 0 | Status: DISCONTINUED | OUTPATIENT
Start: 2019-03-04 | End: 2019-03-05

## 2019-03-04 RX ORDER — LISINOPRIL 2.5 MG/1
10 TABLET ORAL DAILY
Qty: 0 | Refills: 0 | Status: DISCONTINUED | OUTPATIENT
Start: 2019-03-04 | End: 2019-03-05

## 2019-03-04 RX ORDER — CEFAZOLIN SODIUM 1 G
2000 VIAL (EA) INJECTION EVERY 8 HOURS
Qty: 0 | Refills: 0 | Status: DISCONTINUED | OUTPATIENT
Start: 2019-03-04 | End: 2019-03-04

## 2019-03-04 RX ORDER — DONEPEZIL HYDROCHLORIDE 10 MG/1
10 TABLET, FILM COATED ORAL AT BEDTIME
Qty: 0 | Refills: 0 | Status: DISCONTINUED | OUTPATIENT
Start: 2019-03-04 | End: 2019-03-05

## 2019-03-04 RX ORDER — SENNA PLUS 8.6 MG/1
2 TABLET ORAL AT BEDTIME
Qty: 0 | Refills: 0 | Status: DISCONTINUED | OUTPATIENT
Start: 2019-03-04 | End: 2019-03-05

## 2019-03-04 RX ORDER — TAMSULOSIN HYDROCHLORIDE 0.4 MG/1
0.4 CAPSULE ORAL AT BEDTIME
Qty: 0 | Refills: 0 | Status: DISCONTINUED | OUTPATIENT
Start: 2019-03-04 | End: 2019-03-05

## 2019-03-04 RX ORDER — METOPROLOL TARTRATE 50 MG
100 TABLET ORAL
Qty: 0 | Refills: 0 | Status: DISCONTINUED | OUTPATIENT
Start: 2019-03-04 | End: 2019-03-05

## 2019-03-04 RX ORDER — DOCUSATE SODIUM 100 MG
100 CAPSULE ORAL THREE TIMES A DAY
Qty: 0 | Refills: 0 | Status: DISCONTINUED | OUTPATIENT
Start: 2019-03-04 | End: 2019-03-05

## 2019-03-04 RX ORDER — FUROSEMIDE 40 MG
20 TABLET ORAL
Qty: 0 | Refills: 0 | Status: DISCONTINUED | OUTPATIENT
Start: 2019-03-04 | End: 2019-03-05

## 2019-03-04 RX ORDER — CEFAZOLIN SODIUM 1 G
1000 VIAL (EA) INJECTION EVERY 8 HOURS
Qty: 0 | Refills: 0 | Status: DISCONTINUED | OUTPATIENT
Start: 2019-03-04 | End: 2019-03-05

## 2019-03-04 RX ORDER — ONDANSETRON 8 MG/1
4 TABLET, FILM COATED ORAL ONCE
Qty: 0 | Refills: 0 | Status: DISCONTINUED | OUTPATIENT
Start: 2019-03-04 | End: 2019-03-04

## 2019-03-04 RX ORDER — ATROPA BELLADONNA AND OPIUM 16.2; 6 MG/1; MG/1
1 SUPPOSITORY RECTAL
Qty: 0 | Refills: 0 | Status: DISCONTINUED | OUTPATIENT
Start: 2019-03-04 | End: 2019-03-05

## 2019-03-04 RX ORDER — AMIODARONE HYDROCHLORIDE 400 MG/1
100 TABLET ORAL DAILY
Qty: 0 | Refills: 0 | Status: DISCONTINUED | OUTPATIENT
Start: 2019-03-04 | End: 2019-03-05

## 2019-03-04 RX ORDER — MONTELUKAST 4 MG/1
10 TABLET, CHEWABLE ORAL DAILY
Qty: 0 | Refills: 0 | Status: DISCONTINUED | OUTPATIENT
Start: 2019-03-04 | End: 2019-03-05

## 2019-03-04 RX ORDER — ATORVASTATIN CALCIUM 80 MG/1
80 TABLET, FILM COATED ORAL AT BEDTIME
Qty: 0 | Refills: 0 | Status: DISCONTINUED | OUTPATIENT
Start: 2019-03-04 | End: 2019-03-05

## 2019-03-04 RX ORDER — ACETAMINOPHEN 500 MG
650 TABLET ORAL EVERY 6 HOURS
Qty: 0 | Refills: 0 | Status: DISCONTINUED | OUTPATIENT
Start: 2019-03-04 | End: 2019-03-05

## 2019-03-04 RX ORDER — FENTANYL CITRATE 50 UG/ML
50 INJECTION INTRAVENOUS
Qty: 0 | Refills: 0 | Status: DISCONTINUED | OUTPATIENT
Start: 2019-03-04 | End: 2019-03-04

## 2019-03-04 RX ORDER — HEPARIN SODIUM 5000 [USP'U]/ML
5000 INJECTION INTRAVENOUS; SUBCUTANEOUS EVERY 8 HOURS
Qty: 0 | Refills: 0 | Status: DISCONTINUED | OUTPATIENT
Start: 2019-03-04 | End: 2019-03-05

## 2019-03-04 RX ADMIN — HEPARIN SODIUM 5000 UNIT(S): 5000 INJECTION INTRAVENOUS; SUBCUTANEOUS at 14:59

## 2019-03-04 RX ADMIN — ATROPA BELLADONNA AND OPIUM 1 SUPPOSITORY(S): 16.2; 6 SUPPOSITORY RECTAL at 11:00

## 2019-03-04 RX ADMIN — SENNA PLUS 2 TABLET(S): 8.6 TABLET ORAL at 21:41

## 2019-03-04 RX ADMIN — Medication 100 MILLIGRAM(S): at 21:41

## 2019-03-04 RX ADMIN — TAMSULOSIN HYDROCHLORIDE 0.4 MILLIGRAM(S): 0.4 CAPSULE ORAL at 21:41

## 2019-03-04 RX ADMIN — DONEPEZIL HYDROCHLORIDE 10 MILLIGRAM(S): 10 TABLET, FILM COATED ORAL at 21:42

## 2019-03-04 RX ADMIN — Medication 100 MILLIGRAM(S): at 19:02

## 2019-03-04 RX ADMIN — FENTANYL CITRATE 50 MICROGRAM(S): 50 INJECTION INTRAVENOUS at 11:45

## 2019-03-04 RX ADMIN — MONTELUKAST 10 MILLIGRAM(S): 4 TABLET, CHEWABLE ORAL at 21:43

## 2019-03-04 RX ADMIN — HEPARIN SODIUM 5000 UNIT(S): 5000 INJECTION INTRAVENOUS; SUBCUTANEOUS at 21:41

## 2019-03-04 RX ADMIN — ATORVASTATIN CALCIUM 80 MILLIGRAM(S): 80 TABLET, FILM COATED ORAL at 21:41

## 2019-03-04 RX ADMIN — Medication 100 MILLIGRAM(S): at 18:56

## 2019-03-04 RX ADMIN — ATROPA BELLADONNA AND OPIUM 1 SUPPOSITORY(S): 16.2; 6 SUPPOSITORY RECTAL at 11:30

## 2019-03-04 RX ADMIN — FENTANYL CITRATE 50 MICROGRAM(S): 50 INJECTION INTRAVENOUS at 11:30

## 2019-03-04 RX ADMIN — Medication 100 MILLIGRAM(S): at 14:59

## 2019-03-04 NOTE — CONSULT NOTE ADULT - SUBJECTIVE AND OBJECTIVE BOX
Patient is a 89y old  Male who presents with a chief complaint of     HPI: 89 y.o. M with h/o A-fib on Eliquis, CVA, HTN, admitted for TURBT today. S/P OR. POD#0. Currently, only c/o mild pain at supra pubic  region. Denies any CP or SOB      History limited due to: [ ] Dementia  [ ] Delirium  [ ] Condition    Pain Symptoms if applicable:             	                        	   mild          Pain:	                            	    1-3	       Location:	supra pubic   Modifying factors:	  Associated symptoms:	    Function: [ ] Independent  [x ] Assistance  [ ] Total care  [ ] Non-ambulatory    Allergies    No Known Allergies    Intolerances        HOME MEDICATIONS: [x ] Reviewed    MEDICATIONS  (STANDING):  amiodarone    Tablet 100 milliGRAM(s) Oral daily  atorvastatin 80 milliGRAM(s) Oral at bedtime  ceFAZolin   IVPB 1000 milliGRAM(s) IV Intermittent every 8 hours  docusate sodium 100 milliGRAM(s) Oral three times a day  donepezil 10 milliGRAM(s) Oral at bedtime  furosemide    Tablet 20 milliGRAM(s) Oral <User Schedule>  heparin  Injectable 5000 Unit(s) SubCutaneous every 8 hours  lactated ringers. 1000 milliLiter(s) (75 mL/Hr) IV Continuous <Continuous>  lisinopril 10 milliGRAM(s) Oral daily  metoprolol tartrate 100 milliGRAM(s) Oral two times a day  montelukast 10 milliGRAM(s) Oral daily  pantoprazole    Tablet 40 milliGRAM(s) Oral before breakfast  senna 2 Tablet(s) Oral at bedtime  tamsulosin 0.4 milliGRAM(s) Oral at bedtime    MEDICATIONS  (PRN):  acetaminophen   Tablet .. 650 milliGRAM(s) Oral every 6 hours PRN Temp greater or equal to 38C (100.4F), Mild Pain (1 - 3)  belladonna 16.2 mG/opium 30 mg Suppository 1 Suppository(s) Rectal two times a day PRN bladder spasms  fentaNYL    Injectable 50 MICROGram(s) IV Push every 10 minutes PRN Severe Pain (7 - 10)  ondansetron Injectable 4 milliGRAM(s) IV Push once PRN Nausea and/or Vomiting      PAST MEDICAL & SURGICAL HISTORY:  Memory loss  Murmur, cardiac  UTI (urinary tract infection): as of 2-20-19, patient on cipro  Hearing loss: bilateral  Snoring: MAYA precautions -- responds affirmatively to STOP BANG questionnaire  Alcohol abuse: quit in 2012 after first CVA -- had been heavy drinker  Irregular heart beat: Pacemaker--Medtronic (advisa) placed 10-16-15; model #A3SR01; serial number [id number]  Hypertension  Erosion of penile prosthesis  Bladder mass  Dementia  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  BPH (benign prostatic hypertrophy)  Hematuria  Asthma, mild intermittent: last use of inhalers greater than 6 months ago  Dyslipidemia  HTN (hypertension), benign  Atrial fibrillation: diagnosed approximately 2008,  treated medically, on coumadin  Pain: penile prosthesis removed due to erosion  Eye abnormality: post surgery had ptosis left eye (ptosis NOT from CVA)  Bladder tumor: cysto, TURBT  History of permanent cardiac pacemaker placement: Medtronic (advisa) placed 10-16-15; model # A3SR01; serial number [id number]  S/P TURP  [ x] Reviewed     SOCIAL HISTORY:  Residence: [ ] [facility]  [ ] SNF  [x ] Community  [ ] Substance abuse: denies  [ ] Tobacco: denies  [ ] Alcohol use: ex-alcohol abuse    FAMILY HISTORY:  Family history of breast cancer in sister (Sibling)  Family history of type II diabetes mellitus  Family history of myocardial infarction  [ ] No pertinent family history in first degree relatives     REVIEW OF SYSTEMS:    CONSTITUTIONAL: No fever, weight loss, or fatigue  EYES: No eye pain, visual disturbances, or discharge  ENMT:  No difficulty hearing, tinnitus, vertigo; No sinus or throat pain  NECK: No pain or stiffness  BREASTS: No pain, masses, or nipple discharge  RESPIRATORY: No cough, wheezing, chills or hemoptysis; No shortness of breath  CARDIOVASCULAR: No chest pain, palpitations, dizziness, or leg swelling  GASTROINTESTINAL: (+) lower abdominal pain. No nausea, vomiting, or hematemesis; No diarrhea or constipation. No melena or hematochezia. No flatus or BM after OR  GENITOURINARY: (+) dorantes  NEUROLOGICAL: No headaches, memory loss, loss of strength, numbness, or tremors  SKIN: No itching, burning, rashes, or lesions   LYMPH NODES: No enlarged glands  ENDOCRINE: No heat or cold intolerance; No hair loss  MUSCULOSKELETAL: No muscle or back pain  PSYCHIATRIC: No depression, anxiety, mood swings, or difficulty sleeping  HEME/LYMPH: No easy bruising, or bleeding gums  ALLERGY AND IMMUNOLOGIC: No hives or eczema    [  ] All other ROS negative  [  ] Unable to obtain due to poor mental status    Vital Signs Last 24 Hrs  T(C): 36.5 (04 Mar 2019 12:00), Max: 36.5 (04 Mar 2019 08:20)  T(F): 97.7 (04 Mar 2019 12:00), Max: 97.7 (04 Mar 2019 08:20)  HR: 60 (04 Mar 2019 12:00) (60 - 79)  BP: 133/73 (04 Mar 2019 12:00) (108/63 - 153/99)  BP(mean): --  RR: 14 (04 Mar 2019 12:00) (14 - 16)  SpO2: 99% (04 Mar 2019 12:00) (98% - 100%)    PHYSICAL EXAM:    GENERAL: NAD, well-groomed, well-developed  HEAD:  Atraumatic, Normocephalic  EYES: EOMI, PERRLA, conjunctiva and sclera clear  ENMT: Moist mucous membranes  NECK: Supple, No JVD  RESPIRATORY: Clear to auscultation bilaterally; No rales, rhonchi, wheezing, or rubs  CARDIOVASCULAR: Regular rate and rhythm; (+) 2/6 systolic murmurs at LSB, no rubs, or gallops  GASTROINTESTINAL: Soft, Nontender, Nondistended; Bowel sounds present  GENITOURINARY: (+) dorantes with blood tinged urine  EXTREMITIES:  2+ Peripheral Pulses, No clubbing, cyanosis, or edema  NERVOUS SYSTEM:  Alert & Oriented X3; Moving all 4 extremities; No gross sensory deficits  HEME/LYMPH: No lymphadenopathy noted  SKIN: No rashes or lesions; Incisions C/D/I    LABS:      Pre-op lab reviewed        CAPILLARY BLOOD GLUCOSE          RADIOLOGY & ADDITIONAL STUDIES:    EKG:   Personally Reviewed:  [X ] YES Paced rhythm     Imaging:   Personally Reviewed:  [ ] YES               Consultant(s) notes reviewed:  Cardiology clearance note: Pt held Eliquis 3 days prior to surgery, may resume Eliquis as soon as OK with  post OR as per Cardiology   Care Discussed with Consultant(s)/Other Providers:    Advanced Directives: [ ] DNR  [ ] No feeding tube  [ ] MOLST in chart  [ ] MOLST completed today  [x ] Unknown

## 2019-03-04 NOTE — CONSULT NOTE ADULT - PROBLEM SELECTOR RECOMMENDATION 2
S/P Pacemaker. [id number] Score=7  -Continue Amiodarone and Metoprolol  -Resume Eliquis as soon as OK with

## 2019-03-04 NOTE — CONSULT NOTE ADULT - PROBLEM SELECTOR RECOMMENDATION 9
S/P TURBT, POD#0  -Management as per   -Pain control  -Bowel regimen  -OOB  -Incentive spirometer  -DVT prophylaxis

## 2019-03-04 NOTE — ASU PATIENT PROFILE, ADULT - PSH
Bladder tumor  cysto, TURBT  Eye abnormality  post surgery had ptosis left eye (ptosis NOT from CVA)  History of permanent cardiac pacemaker placement  Medtronic (advisa) placed 10-16-15; model # A3SR01; serial number [id number]  Pain  penile prosthesis removed due to erosion  S/P TURP

## 2019-03-04 NOTE — CONSULT NOTE ADULT - ASSESSMENT
89 y.o. M with h/o A-fib on Eliquis, s/p PPM, HTN, CVA, bladder cancer, admitted for TURBT. s/p OR, POD#0

## 2019-03-04 NOTE — BRIEF OPERATIVE NOTE - PROCEDURE
<<-----Click on this checkbox to enter Procedure TURBT (transurethral resection of bladder tumor)  03/04/2019    Active  ANG5

## 2019-03-04 NOTE — PROGRESS NOTE ADULT - SUBJECTIVE AND OBJECTIVE BOX
Post op check    This 89 yo M is s/p TURBT  PMH: CVA  Pt is awake and alert  Has no c/o    Afeb 129/79 60 99%RA  Abd- soft; appropriately tender             wounds C&D  Warner clear emi 150 cc

## 2019-03-04 NOTE — ASU PATIENT PROFILE, ADULT - PMH
Alcohol abuse  quit in 2012 after first CVA -- had been heavy drinker  Asthma, mild intermittent  last use of inhalers greater than 6 months ago  Atrial fibrillation  diagnosed approximately 2008,  treated medically, on coumadin  Bladder mass    BPH (benign prostatic hypertrophy)    CVA (cerebral infarction)  9/2012 with visually disturbances  Dementia    Dyslipidemia    Erosion of penile prosthesis    Hearing loss  bilateral  Hematuria    HTN (hypertension), benign    Hypertension    Irregular heart beat  Pacemaker--Medtronic (advisa) placed 10-16-15; model #A3SR01; serial number [id number]  Memory loss    Murmur, cardiac    Snoring  MAYA precautions -- responds affirmatively to STOP BANG questionnaire  UTI (urinary tract infection)  as of 2-20-19, patient on cipro

## 2019-03-05 ENCOUNTER — TRANSCRIPTION ENCOUNTER (OUTPATIENT)
Age: 84
End: 2019-03-05

## 2019-03-05 VITALS
HEART RATE: 62 BPM | RESPIRATION RATE: 18 BRPM | TEMPERATURE: 98 F | SYSTOLIC BLOOD PRESSURE: 108 MMHG | DIASTOLIC BLOOD PRESSURE: 58 MMHG | OXYGEN SATURATION: 99 %

## 2019-03-05 PROCEDURE — 99233 SBSQ HOSP IP/OBS HIGH 50: CPT

## 2019-03-05 PROCEDURE — 74177 CT ABD & PELVIS W/CONTRAST: CPT | Mod: 26

## 2019-03-05 RX ORDER — RAMIPRIL 5 MG
1 CAPSULE ORAL
Qty: 0 | Refills: 0 | COMMUNITY

## 2019-03-05 RX ORDER — APIXABAN 2.5 MG/1
0 TABLET, FILM COATED ORAL
Qty: 0 | Refills: 0 | COMMUNITY

## 2019-03-05 RX ORDER — CEPHALEXIN 500 MG
1 CAPSULE ORAL
Qty: 6 | Refills: 0 | OUTPATIENT
Start: 2019-03-05 | End: 2019-03-07

## 2019-03-05 RX ADMIN — Medication 650 MILLIGRAM(S): at 15:23

## 2019-03-05 RX ADMIN — Medication 650 MILLIGRAM(S): at 06:04

## 2019-03-05 RX ADMIN — Medication 100 MILLIGRAM(S): at 09:29

## 2019-03-05 RX ADMIN — Medication 20 MILLIGRAM(S): at 06:06

## 2019-03-05 RX ADMIN — Medication 100 MILLIGRAM(S): at 06:04

## 2019-03-05 RX ADMIN — AMIODARONE HYDROCHLORIDE 100 MILLIGRAM(S): 400 TABLET ORAL at 06:04

## 2019-03-05 RX ADMIN — Medication 100 MILLIGRAM(S): at 01:04

## 2019-03-05 RX ADMIN — PANTOPRAZOLE SODIUM 40 MILLIGRAM(S): 20 TABLET, DELAYED RELEASE ORAL at 06:04

## 2019-03-05 RX ADMIN — Medication 650 MILLIGRAM(S): at 07:00

## 2019-03-05 RX ADMIN — HEPARIN SODIUM 5000 UNIT(S): 5000 INJECTION INTRAVENOUS; SUBCUTANEOUS at 06:03

## 2019-03-05 RX ADMIN — LISINOPRIL 10 MILLIGRAM(S): 2.5 TABLET ORAL at 06:04

## 2019-03-05 NOTE — PROGRESS NOTE ADULT - SUBJECTIVE AND OBJECTIVE BOX
Patient is a 89y old  Male who presents with a chief complaint of Bladder tumor (04 Mar 2019 16:24)      SUBJECTIVE / OVERNIGHT EVENTS: No complaints. denies CP, SOB. (+) flatus, (+) BM    MEDICATIONS  (STANDING):  amiodarone    Tablet 100 milliGRAM(s) Oral daily  atorvastatin 80 milliGRAM(s) Oral at bedtime  ceFAZolin   IVPB 1000 milliGRAM(s) IV Intermittent every 8 hours  docusate sodium 100 milliGRAM(s) Oral three times a day  donepezil 10 milliGRAM(s) Oral at bedtime  furosemide    Tablet 20 milliGRAM(s) Oral <User Schedule>  heparin  Injectable 5000 Unit(s) SubCutaneous every 8 hours  lisinopril 10 milliGRAM(s) Oral daily  metoprolol tartrate 100 milliGRAM(s) Oral two times a day  montelukast 10 milliGRAM(s) Oral daily  pantoprazole    Tablet 40 milliGRAM(s) Oral before breakfast  senna 2 Tablet(s) Oral at bedtime  tamsulosin 0.4 milliGRAM(s) Oral at bedtime    MEDICATIONS  (PRN):  acetaminophen   Tablet .. 650 milliGRAM(s) Oral every 6 hours PRN Temp greater or equal to 38C (100.4F), Mild Pain (1 - 3)  belladonna 16.2 mG/opium 30 mg Suppository 1 Suppository(s) Rectal two times a day PRN bladder spasms      Vital Signs Last 24 Hrs  T(C): 36.6 (05 Mar 2019 09:43), Max: 36.7 (05 Mar 2019 05:54)  T(F): 97.8 (05 Mar 2019 09:43), Max: 98 (05 Mar 2019 05:54)  HR: 62 (05 Mar 2019 09:43) (59 - 64)  BP: 108/58 (05 Mar 2019 09:43) (108/58 - 153/99)  BP(mean): --  RR: 18 (05 Mar 2019 09:43) (14 - 18)  SpO2: 99% (05 Mar 2019 09:43) (99% - 100%)  CAPILLARY BLOOD GLUCOSE        I&O's Summary    04 Mar 2019 07:01  -  05 Mar 2019 07:00  --------------------------------------------------------  IN: 150 mL / OUT: 395 mL / NET: -245 mL    05 Mar 2019 07:01  -  05 Mar 2019 10:29  --------------------------------------------------------  IN: 0 mL / OUT: 800 mL / NET: -800 mL        PHYSICAL EXAM:  GENERAL: NAD, well-developed  HEAD:  Atraumatic, Normocephalic  EYES: EOMI, PERRLA, conjunctiva and sclera clear  NECK: Supple, No JVD  CHEST/LUNG: Clear to auscultation bilaterally; No wheeze  HEART: Regular rate and rhythm; No murmurs, rubs, or gallops  ABDOMEN: Soft, Nontender, Nondistended; Bowel sounds present  : (+) Warner with clear urine  EXTREMITIES:  2+ Peripheral Pulses, No clubbing, cyanosis, or edema  PSYCH: AAOx3  NEUROLOGY: non-focal  SKIN: No rashes or lesions    LABS: No lab    CT of abd/pelvice Pending                    RADIOLOGY & ADDITIONAL TESTS:    Imaging Personally Reviewed:    Consultant(s) Notes Reviewed:      Care Discussed with Consultants/Other Providers:

## 2019-03-05 NOTE — DISCHARGE NOTE NURSING/CASE MANAGEMENT/SOCIAL WORK - NSDCPNINST_GEN_ALL_CORE
Call MD for c/o no void, or bloody urine to legbag or dorantes bag, fever, pain not relieved after medicated for pain and a return appointment.  Continue dorantes care and leg bag as instructed, keep bag below waist.  Change old ADDI drainage sit with gauze daily as instructed. Take over the counter stool softener to prevent constipation that can be a side effect from taking pain medication.  Patient educational reading material: warfarin, pantoprazole, clarthromycin, amoxicillin, tylenol, tamsulosin, urinary leg bag, dorantes catheter placement and care, know your medication side effects.

## 2019-03-05 NOTE — DISCHARGE NOTE NURSING/CASE MANAGEMENT/SOCIAL WORK - NSDCPEWEB_GEN_ALL_CORE
[facility] for Tobacco Control website --- [email or web address] website --- [email or web address]

## 2019-03-05 NOTE — DISCHARGE NOTE NURSING/CASE MANAGEMENT/SOCIAL WORK - NSDCPEEMAIL_GEN_ALL_CORE
[facility] for Tobacco Control email [email or web address]

## 2019-03-05 NOTE — PROGRESS NOTE ADULT - ASSESSMENT
89 y.o. M with h/o A-fib on Eliquis, s/p PPM, HTN, CVA, bladder cancer, admitted for TURBT. s/p OR, POD#1

## 2019-03-05 NOTE — PROGRESS NOTE ADULT - PROBLEM SELECTOR PLAN 1
S/P TURBT, POD#1, doing well  -Management as per   -Pain control  -Bowel regimen  -OOB  -DVT prophylaxis

## 2019-03-05 NOTE — DISCHARGE NOTE PROVIDER - NSDCCPTREATMENT_GEN_ALL_CORE_FT
PRINCIPAL PROCEDURE  Procedure: TURBT (transurethral resection of bladder tumor)  Findings and Treatment:

## 2019-03-05 NOTE — DISCHARGE NOTE PROVIDER - CARE PROVIDER_API CALL
Jimmy Rdz)  Urology  [address] [place] [address], [place] [address]  Phone: [phone]  Fax: [phone]  Follow Up Time: Routine

## 2019-03-05 NOTE — DISCHARGE NOTE PROVIDER - NSDCCPCAREPLAN_GEN_ALL_CORE_FT
PRINCIPAL DISCHARGE DIAGNOSIS  Problem: BPH (benign prostatic hyperplasia)  Assessment and Plan of Treatment: Drink plenty of fluids; dorantes care as instructed ; do not take eliquis for one week; see Dr. Rdz on Friday to remove dorantes; call office for appt

## 2019-03-05 NOTE — DISCHARGE NOTE NURSING/CASE MANAGEMENT/SOCIAL WORK - NSDCDPATPORTLINK_GEN_ALL_CORE
You can access the [facility], offered by [facility], by registering with the following website: [email or web address]

## 2019-03-06 PROBLEM — F10.10 ALCOHOL ABUSE, UNCOMPLICATED: Chronic | Status: ACTIVE | Noted: 2019-02-20

## 2019-03-06 PROBLEM — R06.83 SNORING: Chronic | Status: ACTIVE | Noted: 2019-02-20

## 2019-03-06 PROBLEM — H91.90 UNSPECIFIED HEARING LOSS, UNSPECIFIED EAR: Chronic | Status: ACTIVE | Noted: 2019-02-20

## 2019-03-06 PROBLEM — N39.0 URINARY TRACT INFECTION, SITE NOT SPECIFIED: Chronic | Status: ACTIVE | Noted: 2019-02-20

## 2019-03-06 PROBLEM — I10 ESSENTIAL (PRIMARY) HYPERTENSION: Chronic | Status: ACTIVE | Noted: 2019-02-20

## 2019-03-06 PROBLEM — R01.1 CARDIAC MURMUR, UNSPECIFIED: Chronic | Status: ACTIVE | Noted: 2019-02-20

## 2019-03-06 PROBLEM — R41.3 OTHER AMNESIA: Chronic | Status: ACTIVE | Noted: 2019-02-20

## 2019-03-07 LAB — SURGICAL PATHOLOGY STUDY: SIGNIFICANT CHANGE UP

## 2019-03-08 ENCOUNTER — INPATIENT (INPATIENT)
Facility: HOSPITAL | Age: 84
LOS: 6 days | Discharge: NURSING, REHAB OR LONG-TERM CARE | End: 2019-03-15
Attending: SURGERY | Admitting: SURGERY
Payer: MEDICARE

## 2019-03-08 ENCOUNTER — TRANSCRIPTION ENCOUNTER (OUTPATIENT)
Age: 84
End: 2019-03-08

## 2019-03-08 ENCOUNTER — APPOINTMENT (OUTPATIENT)
Dept: UROLOGY | Facility: CLINIC | Age: 84
End: 2019-03-08

## 2019-03-08 VITALS
SYSTOLIC BLOOD PRESSURE: 173 MMHG | HEART RATE: 84 BPM | RESPIRATION RATE: 17 BRPM | OXYGEN SATURATION: 100 % | DIASTOLIC BLOOD PRESSURE: 92 MMHG | TEMPERATURE: 98 F

## 2019-03-08 DIAGNOSIS — Z29.9 ENCOUNTER FOR PROPHYLACTIC MEASURES, UNSPECIFIED: ICD-10-CM

## 2019-03-08 DIAGNOSIS — R52 PAIN, UNSPECIFIED: Chronic | ICD-10-CM

## 2019-03-08 DIAGNOSIS — D49.4 NEOPLASM OF UNSPECIFIED BEHAVIOR OF BLADDER: Chronic | ICD-10-CM

## 2019-03-08 DIAGNOSIS — Q15.9 CONGENITAL MALFORMATION OF EYE, UNSPECIFIED: Chronic | ICD-10-CM

## 2019-03-08 DIAGNOSIS — I48.91 UNSPECIFIED ATRIAL FIBRILLATION: ICD-10-CM

## 2019-03-08 DIAGNOSIS — E78.5 HYPERLIPIDEMIA, UNSPECIFIED: ICD-10-CM

## 2019-03-08 DIAGNOSIS — G81.90 HEMIPLEGIA, UNSPECIFIED AFFECTING UNSPECIFIED SIDE: ICD-10-CM

## 2019-03-08 DIAGNOSIS — I10 ESSENTIAL (PRIMARY) HYPERTENSION: ICD-10-CM

## 2019-03-08 DIAGNOSIS — Z95.0 PRESENCE OF CARDIAC PACEMAKER: Chronic | ICD-10-CM

## 2019-03-08 DIAGNOSIS — Z98.89 OTHER SPECIFIED POSTPROCEDURAL STATES: Chronic | ICD-10-CM

## 2019-03-08 DIAGNOSIS — I73.9 PERIPHERAL VASCULAR DISEASE, UNSPECIFIED: ICD-10-CM

## 2019-03-08 DIAGNOSIS — R31.9 HEMATURIA, UNSPECIFIED: ICD-10-CM

## 2019-03-08 DIAGNOSIS — I63.9 CEREBRAL INFARCTION, UNSPECIFIED: ICD-10-CM

## 2019-03-08 LAB
ALBUMIN SERPL ELPH-MCNC: 3.1 G/DL — LOW (ref 3.3–5)
ALP SERPL-CCNC: 93 U/L — SIGNIFICANT CHANGE UP (ref 40–120)
ALT FLD-CCNC: 6 U/L — SIGNIFICANT CHANGE UP (ref 4–41)
ANION GAP SERPL CALC-SCNC: 8 MMO/L — SIGNIFICANT CHANGE UP (ref 7–14)
APPEARANCE UR: SIGNIFICANT CHANGE UP
APTT BLD: 116.4 SEC — HIGH (ref 27.5–36.3)
APTT BLD: 30.3 SEC — SIGNIFICANT CHANGE UP (ref 27.5–36.3)
AST SERPL-CCNC: 13 U/L — SIGNIFICANT CHANGE UP (ref 4–40)
BASOPHILS # BLD AUTO: 0.01 K/UL — SIGNIFICANT CHANGE UP (ref 0–0.2)
BASOPHILS NFR BLD AUTO: 0.3 % — SIGNIFICANT CHANGE UP (ref 0–2)
BILIRUB SERPL-MCNC: 0.3 MG/DL — SIGNIFICANT CHANGE UP (ref 0.2–1.2)
BILIRUB UR-MCNC: NEGATIVE — SIGNIFICANT CHANGE UP
BLOOD UR QL VISUAL: HIGH
BUN SERPL-MCNC: 14 MG/DL — SIGNIFICANT CHANGE UP (ref 7–23)
CALCIUM SERPL-MCNC: 8.8 MG/DL — SIGNIFICANT CHANGE UP (ref 8.4–10.5)
CHLORIDE SERPL-SCNC: 104 MMOL/L — SIGNIFICANT CHANGE UP (ref 98–107)
CO2 SERPL-SCNC: 27 MMOL/L — SIGNIFICANT CHANGE UP (ref 22–31)
COLOR SPEC: YELLOW — SIGNIFICANT CHANGE UP
CREAT SERPL-MCNC: 1.07 MG/DL — SIGNIFICANT CHANGE UP (ref 0.5–1.3)
EOSINOPHIL # BLD AUTO: 0.05 K/UL — SIGNIFICANT CHANGE UP (ref 0–0.5)
EOSINOPHIL NFR BLD AUTO: 1.3 % — SIGNIFICANT CHANGE UP (ref 0–6)
GLUCOSE SERPL-MCNC: 95 MG/DL — SIGNIFICANT CHANGE UP (ref 70–99)
GLUCOSE UR-MCNC: NEGATIVE — SIGNIFICANT CHANGE UP
HCT VFR BLD CALC: 31.6 % — LOW (ref 39–50)
HCT VFR BLD CALC: 32 % — LOW (ref 39–50)
HGB BLD-MCNC: 9.4 G/DL — LOW (ref 13–17)
HGB BLD-MCNC: 9.4 G/DL — LOW (ref 13–17)
IMM GRANULOCYTES NFR BLD AUTO: 0 % — SIGNIFICANT CHANGE UP (ref 0–1.5)
INR BLD: 1.18 — HIGH (ref 0.88–1.17)
KETONES UR-MCNC: NEGATIVE — SIGNIFICANT CHANGE UP
LEUKOCYTE ESTERASE UR-ACNC: SIGNIFICANT CHANGE UP
LYMPHOCYTES # BLD AUTO: 0.79 K/UL — LOW (ref 1–3.3)
LYMPHOCYTES # BLD AUTO: 21 % — SIGNIFICANT CHANGE UP (ref 13–44)
MCHC RBC-ENTMCNC: 26.4 PG — LOW (ref 27–34)
MCHC RBC-ENTMCNC: 26.4 PG — LOW (ref 27–34)
MCHC RBC-ENTMCNC: 29.4 % — LOW (ref 32–36)
MCHC RBC-ENTMCNC: 29.7 % — LOW (ref 32–36)
MCV RBC AUTO: 88.8 FL — SIGNIFICANT CHANGE UP (ref 80–100)
MCV RBC AUTO: 89.9 FL — SIGNIFICANT CHANGE UP (ref 80–100)
MONOCYTES # BLD AUTO: 0.59 K/UL — SIGNIFICANT CHANGE UP (ref 0–0.9)
MONOCYTES NFR BLD AUTO: 15.7 % — HIGH (ref 2–14)
NEUTROPHILS # BLD AUTO: 2.32 K/UL — SIGNIFICANT CHANGE UP (ref 1.8–7.4)
NEUTROPHILS NFR BLD AUTO: 61.7 % — SIGNIFICANT CHANGE UP (ref 43–77)
NITRITE UR-MCNC: NEGATIVE — SIGNIFICANT CHANGE UP
NRBC # FLD: 0 K/UL — LOW (ref 25–125)
NRBC # FLD: 0 K/UL — LOW (ref 25–125)
PH UR: 7 — SIGNIFICANT CHANGE UP (ref 5–8)
PLATELET # BLD AUTO: 127 K/UL — LOW (ref 150–400)
PLATELET # BLD AUTO: 137 K/UL — LOW (ref 150–400)
PMV BLD: 10.9 FL — SIGNIFICANT CHANGE UP (ref 7–13)
PMV BLD: 11.2 FL — SIGNIFICANT CHANGE UP (ref 7–13)
POTASSIUM SERPL-MCNC: 4.1 MMOL/L — SIGNIFICANT CHANGE UP (ref 3.5–5.3)
POTASSIUM SERPL-SCNC: 4.1 MMOL/L — SIGNIFICANT CHANGE UP (ref 3.5–5.3)
PROT SERPL-MCNC: 6.7 G/DL — SIGNIFICANT CHANGE UP (ref 6–8.3)
PROT UR-MCNC: 100 — HIGH
PROTHROM AB SERPL-ACNC: 13.5 SEC — HIGH (ref 9.8–13.1)
RBC # BLD: 3.56 M/UL — LOW (ref 4.2–5.8)
RBC # BLD: 3.56 M/UL — LOW (ref 4.2–5.8)
RBC # FLD: 16.7 % — HIGH (ref 10.3–14.5)
RBC # FLD: 16.7 % — HIGH (ref 10.3–14.5)
RBC CASTS # UR COMP ASSIST: >50 — HIGH (ref 0–?)
SODIUM SERPL-SCNC: 139 MMOL/L — SIGNIFICANT CHANGE UP (ref 135–145)
SP GR SPEC: 1.03 — SIGNIFICANT CHANGE UP (ref 1–1.04)
UROBILINOGEN FLD QL: NORMAL — SIGNIFICANT CHANGE UP
WBC # BLD: 3.76 K/UL — LOW (ref 3.8–10.5)
WBC # BLD: 4 K/UL — SIGNIFICANT CHANGE UP (ref 3.8–10.5)
WBC # FLD AUTO: 3.76 K/UL — LOW (ref 3.8–10.5)
WBC # FLD AUTO: 4 K/UL — SIGNIFICANT CHANGE UP (ref 3.8–10.5)
WBC UR QL: SIGNIFICANT CHANGE UP (ref 0–?)

## 2019-03-08 PROCEDURE — 99291 CRITICAL CARE FIRST HOUR: CPT

## 2019-03-08 PROCEDURE — 99223 1ST HOSP IP/OBS HIGH 75: CPT | Mod: 57

## 2019-03-08 PROCEDURE — 70496 CT ANGIOGRAPHY HEAD: CPT | Mod: 26

## 2019-03-08 PROCEDURE — 75635 CT ANGIO ABDOMINAL ARTERIES: CPT | Mod: 26

## 2019-03-08 PROCEDURE — 70498 CT ANGIOGRAPHY NECK: CPT | Mod: 26

## 2019-03-08 PROCEDURE — 99223 1ST HOSP IP/OBS HIGH 75: CPT

## 2019-03-08 RX ORDER — FOLIC ACID 0.8 MG
1 TABLET ORAL
Qty: 0 | Refills: 0 | COMMUNITY

## 2019-03-08 RX ORDER — METOPROLOL TARTRATE 50 MG
1 TABLET ORAL
Qty: 0 | Refills: 0 | COMMUNITY

## 2019-03-08 RX ORDER — PANTOPRAZOLE SODIUM 20 MG/1
1 TABLET, DELAYED RELEASE ORAL
Qty: 0 | Refills: 0 | COMMUNITY

## 2019-03-08 RX ORDER — ATORVASTATIN CALCIUM 80 MG/1
80 TABLET, FILM COATED ORAL AT BEDTIME
Qty: 0 | Refills: 0 | Status: DISCONTINUED | OUTPATIENT
Start: 2019-03-08 | End: 2019-03-15

## 2019-03-08 RX ORDER — ASPIRIN/CALCIUM CARB/MAGNESIUM 324 MG
300 TABLET ORAL DAILY
Qty: 0 | Refills: 0 | Status: DISCONTINUED | OUTPATIENT
Start: 2019-03-08 | End: 2019-03-08

## 2019-03-08 RX ORDER — TAMSULOSIN HYDROCHLORIDE 0.4 MG/1
0.4 CAPSULE ORAL AT BEDTIME
Qty: 0 | Refills: 0 | Status: DISCONTINUED | OUTPATIENT
Start: 2019-03-08 | End: 2019-03-15

## 2019-03-08 RX ORDER — DOCUSATE SODIUM 100 MG
100 CAPSULE ORAL THREE TIMES A DAY
Qty: 0 | Refills: 0 | Status: DISCONTINUED | OUTPATIENT
Start: 2019-03-08 | End: 2019-03-15

## 2019-03-08 RX ORDER — ATORVASTATIN CALCIUM 80 MG/1
80 TABLET, FILM COATED ORAL
Qty: 0 | Refills: 0 | COMMUNITY

## 2019-03-08 RX ORDER — APIXABAN 2.5 MG/1
1 TABLET, FILM COATED ORAL
Qty: 0 | Refills: 0 | COMMUNITY

## 2019-03-08 RX ORDER — HEPARIN SODIUM 5000 [USP'U]/ML
3000 INJECTION INTRAVENOUS; SUBCUTANEOUS EVERY 6 HOURS
Qty: 0 | Refills: 0 | Status: DISCONTINUED | OUTPATIENT
Start: 2019-03-08 | End: 2019-03-09

## 2019-03-08 RX ORDER — HEPARIN SODIUM 5000 [USP'U]/ML
6500 INJECTION INTRAVENOUS; SUBCUTANEOUS EVERY 6 HOURS
Qty: 0 | Refills: 0 | Status: DISCONTINUED | OUTPATIENT
Start: 2019-03-08 | End: 2019-03-09

## 2019-03-08 RX ORDER — MONTELUKAST 4 MG/1
1 TABLET, CHEWABLE ORAL
Qty: 0 | Refills: 0 | COMMUNITY

## 2019-03-08 RX ORDER — METOPROLOL TARTRATE 50 MG
100 TABLET ORAL
Qty: 0 | Refills: 0 | Status: DISCONTINUED | OUTPATIENT
Start: 2019-03-08 | End: 2019-03-15

## 2019-03-08 RX ORDER — MONTELUKAST 4 MG/1
10 TABLET, CHEWABLE ORAL DAILY
Qty: 0 | Refills: 0 | Status: DISCONTINUED | OUTPATIENT
Start: 2019-03-08 | End: 2019-03-15

## 2019-03-08 RX ORDER — DONEPEZIL HYDROCHLORIDE 10 MG/1
1 TABLET, FILM COATED ORAL
Qty: 0 | Refills: 0 | COMMUNITY

## 2019-03-08 RX ORDER — FUROSEMIDE 40 MG
20 TABLET ORAL DAILY
Qty: 0 | Refills: 0 | Status: DISCONTINUED | OUTPATIENT
Start: 2019-03-08 | End: 2019-03-09

## 2019-03-08 RX ORDER — PANTOPRAZOLE SODIUM 20 MG/1
40 TABLET, DELAYED RELEASE ORAL
Qty: 0 | Refills: 0 | Status: DISCONTINUED | OUTPATIENT
Start: 2019-03-08 | End: 2019-03-15

## 2019-03-08 RX ORDER — APIXABAN 2.5 MG/1
5 TABLET, FILM COATED ORAL EVERY 12 HOURS
Qty: 0 | Refills: 0 | Status: DISCONTINUED | OUTPATIENT
Start: 2019-03-08 | End: 2019-03-08

## 2019-03-08 RX ORDER — TAMSULOSIN HYDROCHLORIDE 0.4 MG/1
1 CAPSULE ORAL
Qty: 0 | Refills: 0 | COMMUNITY

## 2019-03-08 RX ORDER — SENNA PLUS 8.6 MG/1
1 TABLET ORAL
Qty: 0 | Refills: 0 | COMMUNITY

## 2019-03-08 RX ORDER — DOCUSATE SODIUM 100 MG
0 CAPSULE ORAL
Qty: 0 | Refills: 0 | COMMUNITY

## 2019-03-08 RX ORDER — SODIUM CHLORIDE 9 MG/ML
1000 INJECTION INTRAMUSCULAR; INTRAVENOUS; SUBCUTANEOUS
Qty: 0 | Refills: 0 | Status: DISCONTINUED | OUTPATIENT
Start: 2019-03-08 | End: 2019-03-09

## 2019-03-08 RX ORDER — CEPHALEXIN 500 MG
500 CAPSULE ORAL ONCE
Qty: 0 | Refills: 0 | Status: COMPLETED | OUTPATIENT
Start: 2019-03-08 | End: 2019-03-08

## 2019-03-08 RX ORDER — ATORVASTATIN CALCIUM 80 MG/1
1 TABLET, FILM COATED ORAL
Qty: 0 | Refills: 0 | COMMUNITY

## 2019-03-08 RX ORDER — AMIODARONE HYDROCHLORIDE 400 MG/1
100 TABLET ORAL DAILY
Qty: 0 | Refills: 0 | Status: DISCONTINUED | OUTPATIENT
Start: 2019-03-08 | End: 2019-03-09

## 2019-03-08 RX ORDER — AMIODARONE HYDROCHLORIDE 400 MG/1
1 TABLET ORAL
Qty: 0 | Refills: 0 | COMMUNITY

## 2019-03-08 RX ORDER — SENNA PLUS 8.6 MG/1
2 TABLET ORAL AT BEDTIME
Qty: 0 | Refills: 0 | Status: DISCONTINUED | OUTPATIENT
Start: 2019-03-08 | End: 2019-03-15

## 2019-03-08 RX ORDER — AMIODARONE HYDROCHLORIDE 400 MG/1
200 TABLET ORAL DAILY
Qty: 0 | Refills: 0 | Status: DISCONTINUED | OUTPATIENT
Start: 2019-03-08 | End: 2019-03-08

## 2019-03-08 RX ORDER — HEPARIN SODIUM 5000 [USP'U]/ML
INJECTION INTRAVENOUS; SUBCUTANEOUS
Qty: 25000 | Refills: 0 | Status: DISCONTINUED | OUTPATIENT
Start: 2019-03-08 | End: 2019-03-09

## 2019-03-08 RX ORDER — DONEPEZIL HYDROCHLORIDE 10 MG/1
10 TABLET, FILM COATED ORAL AT BEDTIME
Qty: 0 | Refills: 0 | Status: DISCONTINUED | OUTPATIENT
Start: 2019-03-08 | End: 2019-03-15

## 2019-03-08 RX ORDER — ACETAMINOPHEN 500 MG
650 TABLET ORAL ONCE
Qty: 0 | Refills: 0 | Status: COMPLETED | OUTPATIENT
Start: 2019-03-08 | End: 2019-03-08

## 2019-03-08 RX ORDER — FOLIC ACID 0.8 MG
1 TABLET ORAL DAILY
Qty: 0 | Refills: 0 | Status: DISCONTINUED | OUTPATIENT
Start: 2019-03-08 | End: 2019-03-15

## 2019-03-08 RX ADMIN — Medication 100 MILLIGRAM(S): at 17:22

## 2019-03-08 RX ADMIN — ATORVASTATIN CALCIUM 80 MILLIGRAM(S): 80 TABLET, FILM COATED ORAL at 22:27

## 2019-03-08 RX ADMIN — Medication 650 MILLIGRAM(S): at 13:42

## 2019-03-08 RX ADMIN — DONEPEZIL HYDROCHLORIDE 10 MILLIGRAM(S): 10 TABLET, FILM COATED ORAL at 22:27

## 2019-03-08 RX ADMIN — MONTELUKAST 10 MILLIGRAM(S): 4 TABLET, CHEWABLE ORAL at 17:22

## 2019-03-08 RX ADMIN — Medication 100 MILLIGRAM(S): at 22:27

## 2019-03-08 RX ADMIN — TAMSULOSIN HYDROCHLORIDE 0.4 MILLIGRAM(S): 0.4 CAPSULE ORAL at 22:27

## 2019-03-08 RX ADMIN — HEPARIN SODIUM 1500 UNIT(S)/HR: 5000 INJECTION INTRAVENOUS; SUBCUTANEOUS at 17:21

## 2019-03-08 RX ADMIN — Medication 1 MILLIGRAM(S): at 17:22

## 2019-03-08 RX ADMIN — Medication 500 MILLIGRAM(S): at 17:21

## 2019-03-08 RX ADMIN — Medication 20 MILLIGRAM(S): at 17:22

## 2019-03-08 RX ADMIN — SENNA PLUS 2 TABLET(S): 8.6 TABLET ORAL at 22:27

## 2019-03-08 RX ADMIN — Medication 650 MILLIGRAM(S): at 12:52

## 2019-03-08 RX ADMIN — SODIUM CHLORIDE 75 MILLILITER(S): 9 INJECTION INTRAMUSCULAR; INTRAVENOUS; SUBCUTANEOUS at 19:18

## 2019-03-08 NOTE — ED PROVIDER NOTE - OBJECTIVE STATEMENT
This is a 89 y/o M PMH HTN, dyslipidemia, atrial fibrillation (on Eliquis, though has not taken in +1 week due to procedure) distant CVA with residual left sided visual deficits, prior h/o bladder tumor with recent cystoscopy TURBT on 3-4-19 presents with left sided numbness and weakness. Last known normal 8:30pm last night. This morning woke up around 5am and was c/o numbness on the left side of the body as well as pain. Pt is mentating at his baseline per family members, does not have any change in his speech, and appears normal to them. Per family, pt has been c/o pain in the left leg since the procedure but had not c/o numbness until this morning. Pt lives at home with his wife and is independent of all ADLs and does not walk with assist. This morning pt had difficulty transferring from bed to chair due to gait instability. Of note, was taken off AC 3 days prior to procedure and is supposed to stay off until 2 weeks after. Denies any change in vision, headache, cp, sob, f/c, or recent illness. Still has dorantes cather from procedure.

## 2019-03-08 NOTE — H&P ADULT - PROBLEM SELECTOR PLAN 4
Continue statin   FLP Continue Metoprolol, donepezil   Monitor BP and adjust meds as needed   DASH diet

## 2019-03-08 NOTE — ED ADULT NURSE NOTE - OBJECTIVE STATEMENT
Patient received in room 16, alert and oriented x 4,[place] , respirations are even and unlabored, S1, S2 regular , 20 gauge saline lock on left basilic vein and received with dorantes 20 Portuguese in place draining red tinged urine with no clots noted, dorantes bag changed to new one in ED. AM blood drawn and sent. Belongings and family are at the bedside. Will follow up.

## 2019-03-08 NOTE — CONSULT NOTE ADULT - ATTENDING COMMENTS
I have seen and examined this patient with the stroke neurology team.     History was reviewed with the patient and/or available family members.   ROS: All negative except documented in the HPI.   Neurological exam was performed and agree with exam as documented above.   Laboratory results and imaging studies were reviewed by me.   I agree with the neurology resident note as documented above.    89 years old man with multiple vascular risk factors including atrial fibrillation not on therapeutic anticoagulation at the time of presentation and age is evaluated at [facility] for left-sided weakness and sensory paresthesia in the form of numbness noted in the morning of 3/8 with last known well been the night prior. CT brain on admission did not show any evidence of acute infarct or hemorrhage but showed old bilateral MCA distribution infarcts. CTA head and neck was grossly unremarkable. Neurological examination today shows mild left hemiparesis (LLE>LUE) and mild dysarthria. Of note, he reports to be on therapeutic anticoagulation with apixaban in the past, which was withheld on 3/1 in preparation of urological procedure on 3/4. He is reported to have continued hematuria since the procedure.    Impression:  Cerebral embolism with cerebral infarction. Probable right BROOKE distribution stroke-likely etiology being cardioembolism in the setting of atrial fibrillation and not being on therapeutic anticoagulation at the time of stroke    Plan:  - Not a candidate for IV tPA as he is out of the time window  - Not a candidate for mechanical embolectomy based on CTA head and neck findings  - Considered starting therapeutic anticoagulation for secondary stroke prevention once safe/cleared/not contraindicated from urological and medical standpoint. Could consider starting therapeutic anticoagulation with heparin drip and goal PTT being 50-70 to determine the status of hematuria while being on therapeutic anticoagulation and subsequently could consider switching to oral anticoagulant-like apixaban if deemed to have non-valvular atrial fibrillation. Risks versus benefits and adverse reactions/complications associated with therapeutic anticoagulation with apixaban including occipitally increased risk of stroke or MI upon abrupt discontinuation of the medication were discussed with him and available family members at bedside in detail  - Consider starting aspirin for secondary stroke prevention if therapeutic anticoagulation is contraindicated from urological standpoint. No indications to continue with antiplatelet therapy upon starting therapeutic anticoagulation from secondary stroke prevention standpoint unless he has a history of CAD or cardiac stents.  - Agree to continue with pharmacological DVT prophylaxis for now which will be discontinued upon starting therapeutic anticoagulation  - Atorvastatin 80 mg at bedtime once able to pass the swallow evaluation or enteral access is established; titrate the dose according to LDL  - HbA1C and LDL, continue with aggressive vascular risk factors modifications   - Permissive HTN up to 180/105 mmHg (in the setting of therapeutic anticoagulation use) for first 48-72 hours from symptom onset followed by gradual normotension  - Repeat CT brain in about 24-48 hours   - TTE with bubble study and continue with telemetry   - PT/OT/Speech and swallow/safety eval  - Stroke education    Above mentioned plan was discussed with patient and available family member in detail. All the questions were answered and concerns were addressed. More than 82 minutes were spent in evaluation and management of this critically ill and unstable patient with an acute stroke.
I performed a history and physical exam of the patient and discussed  the findings and plan with the house officer. I reviewed the resident note and agree with the findings and plan

## 2019-03-08 NOTE — H&P ADULT - MUSCULOSKELETAL
details… detailed exam normal/no joint erythema/no joint warmth/no joint swelling/diminished strength/ROM intact/calf tenderness/decreased ROM

## 2019-03-08 NOTE — H&P ADULT - NEGATIVE ALLERGY TYPES
no reactions to insect bites/no outdoor environmental allergies/no indoor environmental allergies/no reactions to animals/no reactions to medicines/no reactions to food

## 2019-03-08 NOTE — CONSULT NOTE ADULT - SUBJECTIVE AND OBJECTIVE BOX
Vascular Surgery Consult Note  Pager [phone]    HPI:  89-year-old man with afib (stopped Eliquis one week ago for TURBT on 3/4), dementia but independent in ADLs and can ambulate on his own, previous CVA, pacemaker presents with left leg pain and numbness since 530 on 3/8. Also had some left arm weakness/numbness on presentation so underwent a stroke workup with CTA head/neck which was negative. Was noted 9 hours after presentation to the ED with coldness of the left foot/ankle.      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  BPH (benign prostatic hypertrophy)  Hematuria  Asthma, mild intermittent: last use of inhalers greater than 6 months ago  Dyslipidemia  HTN (hypertension), benign  Atrial fibrillation: diagnosed approximately ,  treated medically, on coumadin  Pain: penile prosthesis removed due to erosion  Eye abnormality: post surgery had ptosis left eye (ptosis NOT from CVA)  Bladder tumor: cysto, TURBT  History of permanent cardiac pacemaker placement: Medtronic (advisa) placed 10-16-15; model # A3SR01; serial number [id number]  S/P TURP      ALLERGIES:  NKA      HOME MEDICATIONS:  amiodarone 100 mg oral tablet: 1 tab(s) orally once a day (08 Mar 2019 15:30)  atorvastatin 80 mg oral tablet: 1 tab(s) orally once a day (08 Mar 2019 15:30)  docusate sodium 100 mg oral tablet: orally 3 times a day (08 Mar 2019 15:30)  donepezil 10 mg oral tablet: 1 tab(s) orally once a day (at bedtime) (08 Mar 2019 15:30)  Eliquis 5 mg oral tablet: 1 tab(s) orally 2 times a day (08 Mar 2019 15:30)  folic acid 0.4 mg oral tablet: 1 tab(s) orally once a day (08 Mar 2019 15:30)  furosemide 20 mg oral tablet: 1 tab(s) orally every other day (08 Mar 2019 15:30)  Metoprolol Tartrate 100 mg oral tablet: 1 tab(s) orally 2 times a day (08 Mar 2019 15:30)  montelukast 10 mg oral tablet: 1 tab(s) orally once a day (08 Mar 2019 15:30)  pantoprazole 40 mg oral delayed release tablet: 1 tab(s) orally once a day (08 Mar 2019 15:30)  ramipril 2.5 mg oral capsule: 1 cap(s) orally once a day    Held prior to TURB (08 Mar 2019 15:30)  Senna 8.6 mg oral tablet: 1 tab(s) orally once a day (at bedtime) (08 Mar 2019 15:30)  tamsulosin 0.4 mg oral capsule: 1 cap(s) orally once a day (08 Mar 2019 15:30)  triple magnesium complex: 400 milligram(s)  once a day (08 Mar 2019 15:30)  Tylenol 325 mg oral capsule: 1 cap(s) orally 3 times a day, As Needed (08 Mar 2019 15:30)    MEDICATIONS  (STANDING):  amiodarone    Tablet 100 milliGRAM(s) Oral daily  atorvastatin 80 milliGRAM(s) Oral at bedtime  docusate sodium 100 milliGRAM(s) Oral three times a day  donepezil 10 milliGRAM(s) Oral at bedtime  folic acid 1 milliGRAM(s) Oral daily  furosemide    Tablet 20 milliGRAM(s) Oral daily  heparin  Infusion.  Unit(s)/Hr (15 mL/Hr) IV Continuous <Continuous>  metoprolol tartrate 100 milliGRAM(s) Oral two times a day  montelukast 10 milliGRAM(s) Oral daily  pantoprazole    Tablet 40 milliGRAM(s) Oral before breakfast  senna 2 Tablet(s) Oral at bedtime  sodium chloride 0.9%. 1000 milliLiter(s) (75 mL/Hr) IV Continuous <Continuous>  tamsulosin 0.4 milliGRAM(s) Oral at bedtime      SOCIAL HISTORY:  Former smoker, quit over 15 years ago. Lives with wife.      FAMILY HISTORY:  No pertinent history in first degree relatives.  ___________________________________________  REVIEW OF SYSTEMS:  Constitutional: No fevers, chills, no recent weight loss  ENMT: No changes in hearing, no changes in vision, no sore throat, no cough  Respiratory: No shortness of breath  Cardiovascular: No chest pain, palpitations  Gastrointestinal: No abdominal pain, no diarrhea/constipation  Genitourinary: No dysuria, frequency, or urgency    Extremities: No joint swelling, no limited range of movement  Neurological: Numbness of left foot  Skin: No rashes  ___________________________________________  PHYSICAL EXAM:  Vital Signs Last 24 Hrs  T(C): 36.4 (08 Mar 2019 17:15), Max: 36.4 (08 Mar 2019 06:46)  T(F): 97.6 (08 Mar 2019 17:15), Max: 97.6 (08 Mar 2019 15:40)  HR: 99 (08 Mar 2019 20:06) (60 - 99)  BP: 158/87 (08 Mar 2019 20:06) (140/77 - 188/88)  BP(mean): --  RR: 17 (08 Mar 2019 20:06) (16 - 19)  SpO2: 99% (08 Mar 2019 20:06) (99% - 100%)CAPILLARY BLOOD GLUCOSE      POCT Blood Glucose.: 109 mg/dL (08 Mar 2019 06:48)      General: NAD  Neuro: Motor and sensory grossly intact.  HEENT: Anicteric sclerae.  Respiratory: Unlabored breathing.   CVS: Regular rate and rhythm.  Abdomen: Soft, non-distended, non-tender.  Extremities: Right - Palpable DP. Left - no DP/PT, popliteal pulse or signal, femoral pulse.  MSK: Can move left toes and dorsi/plantarflex  ____________________________________________  LABS:  CBC Full  -  ( 08 Mar 2019 08:00 )  WBC Count : 3.76 K/uL  Hemoglobin : 9.4 g/dL  Hematocrit : 31.6 %  Platelet Count - Automated : 137 K/uL  Mean Cell Volume : 88.8 fL  Mean Cell Hemoglobin : 26.4 pg  Mean Cell Hemoglobin Concentration : 29.7 %  Auto Neutrophil # : 2.32 K/uL  Auto Lymphocyte # : 0.79 K/uL  Auto Monocyte # : 0.59 K/uL  Auto Eosinophil # : 0.05 K/uL  Auto Basophil # : 0.01 K/uL  Auto Neutrophil % : 61.7 %  Auto Lymphocyte % : 21.0 %  Auto Monocyte % : 15.7 %  Auto Eosinophil % : 1.3 %  Auto Basophil % : 0.3 %        139  |  104  |  14  ----------------------------<  95  4.1   |  27  |  1.07    Ca    8.8      08 Mar 2019 08:00    TPro  6.7  /  Alb  3.1<L>  /  TBili  0.3  /  DBili  x   /  AST  13  /  ALT  6   /  AlkPhos  93  03-08    LIVER FUNCTIONS - ( 08 Mar 2019 08:00 )  Alb: 3.1 g/dL / Pro: 6.7 g/dL / ALK PHOS: 93 u/L / ALT: 6 u/L / AST: 13 u/L / GGT: x           PT/INR - ( 08 Mar 2019 08:00 )   PT: 13.5 SEC;   INR: 1.18          PTT - ( 08 Mar 2019 08:00 )  PTT:30.3 SEC  Urinalysis Basic - ( 08 Mar 2019 11:00 )    Color: YELLOW / Appearance: Lt TURBID / S.027 / pH: 7.0  Gluc: NEGATIVE / Ketone: NEGATIVE  / Bili: NEGATIVE / Urobili: NORMAL   Blood: LARGE / Protein: 100 / Nitrite: NEGATIVE   Leuk Esterase: SMALL / RBC: >50 / WBC 5-10   Sq Epi: x / Non Sq Epi: x / Bacteria: x    ________________________________  RADIOLOGY:  CT Angio Abd Aorta w/run-off w/ IV Cont (19 @ 18:30)   IMPRESSION:     1.  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 The remainder of the   arterial system is opacified either via slow flow or retrograde flow.  2.  No right lower extremity significant stenosis is shown. The right   lower extremity system below the knee is opacified on the late phase   imaging, however, this could be secondary to the rate of acquisition   rather than slow flow given the absence of a proximal right-sided   stenosis.  3.  Redemonstration of a right hepatic lesion measuring 4.7x 4.6 cm is   better evaluated on recent portal venous phase CT.   4.  Complex multiloculated collection in the anterior wall appears   unchanged measuring 12.9 x 5.4 cm in its largest component.  5.  Unchanged heterogeneous mass adjacent to the leftexternal iliac   vessels.

## 2019-03-08 NOTE — CONSULT NOTE ADULT - SUBJECTIVE AND OBJECTIVE BOX
JUNAID [id number] is a 89y old  Male who presents with a chief complaint of     HPI: 89 yo M with PMH of           MEDICATIONS  (STANDING):    MEDICATIONS  (PRN):    PAST MEDICAL & SURGICAL HISTORY:  Memory loss  Murmur, cardiac  UTI (urinary tract infection): as of 2-20-19, patient on cipro  Hearing loss: bilateral  Snoring: MAYA precautions -- responds affirmatively to STOP BANG questionnaire  Alcohol abuse: quit in 2012 after first CVA -- had been heavy drinker  Irregular heart beat: Pacemaker--Medtronic (advisa) placed 10-16-15; model #A3SR01; serial number [id number]  Hypertension  Erosion of penile prosthesis  Bladder mass  Dementia  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  BPH (benign prostatic hypertrophy)  Hematuria  Asthma, mild intermittent: last use of inhalers greater than 6 months ago  Dyslipidemia  HTN (hypertension), benign  Atrial fibrillation: diagnosed approximately 2008,  treated medically, on coumadin  Pain: penile prosthesis removed due to erosion  Eye abnormality: post surgery had ptosis left eye (ptosis NOT from CVA)  Bladder tumor: cysto, TURBT  History of permanent cardiac pacemaker placement: Medtronic (advisa) placed 10-16-15; model # A3SR01; serial number [id number]  S/P TURP    FAMILY HISTORY:  Family history of breast cancer in sister (Sibling)  Family history of type II diabetes mellitus  Family history of myocardial infarction    Allergies    No Known Allergies    Intolerances        SHx - No smoking, No ETOH, No drug abuse      Review of Systems:    see hpi    Vital Signs Last 24 Hrs  T(C): 36.4 (08 Mar 2019 06:46), Max: 36.4 (08 Mar 2019 06:46)  T(F): 97.5 (08 Mar 2019 06:46), Max: 97.5 (08 Mar 2019 06:46)  HR: 84 (08 Mar 2019 06:46) (84 - 84)  BP: 173/92 (08 Mar 2019 06:46) (173/92 - 173/92)  BP(mean): --  RR: 17 (08 Mar 2019 06:46) (17 - 17)  SpO2: 100% (08 Mar 2019 06:46) (100% - 100%)    Neurological Exam:  Mental Status: Orientated to self, date and place.  Attention intact.  No dysarthria. Speech fluent.  Cranial Nerves:  EOMI, VFF, no nystagmus.   dec sensation to light touch on left .  No facial asymmetry.  Hearing intact to finger rub bilaterally.  Tongue, uvula and palate midline.  Sternocleidomastoid and Trapezius intact bilaterally.    Motor:   Tone: normal.                  Strength:     [] Upper extremity                      Delt       Bicep    Tricep                                                  R         5/5 5/5 5/5 5/5                                               L          5/5 5/5 5/5 5/5  [] Lower extremity                       HF          KE          KF        DF         PF                                               R        5/5 5/5 5/5 5/5 5/5                                               L         5/5 5/5 5/5 5/5 5/5  Pronator drift: none                 Dysmetria: None to finger-nose-finger or heel-shin-heel  No truncal ataxia.    Tremor: No resting, postural or action tremor.  No myoclonus.    Sensation: intact to light touch, pinprick, vibration and proprioception    Deep Tendon Reflexes:     Biceps          Triceps      BR        Patellar        Ankle         Babinski                                         R       2+                   2+           2+            2+               2+           downgoing                                         L        2+                  2+           2+            2+               2+           downgoing    Gait: normal.      Other:    03-08    139  |  104  |  14  ----------------------------<  95  4.1   |  27  |  1.07    Ca    8.8      08 Mar 2019 08:00    TPro  6.7  /  Alb  3.1<L>  /  TBili  0.3  /  DBili  x   /  AST  13  /  ALT  6   /  AlkPhos  93  03-08                            9.4    3.76  )-----------( 137      ( 08 Mar 2019 08:00 )             31.6       Radiology    CT:   MRI  EKG:  tele:  TTE:  EEG: JUNAID [id number] is a 89y old  Male who presents with a chief complaint of     HPI: 89 yo M with PMH of a-fib on Eliquis (recently held due to TURBT procedure for bladder tumor), HTN< HLD, hx of acute ischemic stroke in the past, PPM, presents to the ER for left sided numbness and weakness since this morning. LKW time 3/7/19 at 8:30 pm. Patient reports that he woke up at 5:30 am 3/8/19, and he woke up with left leg pain, along with left leg numbness and left arm numbness. Numbness then progressed to numbness and weakness in the left arm and left leg. He also endorsed dizziness. In the ER, his symptoms began to improve. NIHSS 0. MRS 0.    MEDICATIONS  (STANDING):    MEDICATIONS  (PRN):    PAST MEDICAL & SURGICAL HISTORY:  Memory loss  Murmur, cardiac  UTI (urinary tract infection): as of 2-20-19, patient on cipro  Hearing loss: bilateral  Snoring: MAYA precautions -- responds affirmatively to STOP BANG questionnaire  Alcohol abuse: quit in 2012 after first CVA -- had been heavy drinker  Irregular heart beat: Pacemaker--Medtronic (advisa) placed 10-16-15; model #A3SR01; serial number [id number]  Hypertension  Erosion of penile prosthesis  Bladder mass  Dementia  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  BPH (benign prostatic hypertrophy)  Hematuria  Asthma, mild intermittent: last use of inhalers greater than 6 months ago  Dyslipidemia  HTN (hypertension), benign  Atrial fibrillation: diagnosed approximately 2008,  treated medically, on coumadin  Pain: penile prosthesis removed due to erosion  Eye abnormality: post surgery had ptosis left eye (ptosis NOT from CVA)  Bladder tumor: cysto, TURBT  History of permanent cardiac pacemaker placement: Medtronic (advisa) placed 10-16-15; model # A3SR01; serial number [id number]  S/P TURP    FAMILY HISTORY:  Family history of breast cancer in sister (Sibling)  Family history of type II diabetes mellitus  Family history of myocardial infarction    Allergies    No Known Allergies    Intolerances        SHx - No smoking, No ETOH, No drug abuse      Review of Systems:    see hpi    Vital Signs Last 24 Hrs  T(C): 36.4 (08 Mar 2019 06:46), Max: 36.4 (08 Mar 2019 06:46)  T(F): 97.5 (08 Mar 2019 06:46), Max: 97.5 (08 Mar 2019 06:46)  HR: 84 (08 Mar 2019 06:46) (84 - 84)  BP: 173/92 (08 Mar 2019 06:46) (173/92 - 173/92)  BP(mean): --  RR: 17 (08 Mar 2019 06:46) (17 - 17)  SpO2: 100% (08 Mar 2019 06:46) (100% - 100%)    Neurological Exam:  Mental Status: Orientated to self, date and place.  Attention intact.  No dysarthria. Speech fluent.  Cranial Nerves:  EOMI, VFF, no nystagmus.    No facial asymmetry.  Tongue midline.          Strength: no drift b/l UE. arm rolling test shows mild orbiting around the left arm           Dysmetria: None to finger-nose-finger    Sensation: decreased sensation on left leg compare to right leg. sensation to LT intact in b/l UE.    Other:    03-08    139  |  104  |  14  ----------------------------<  95  4.1   |  27  |  1.07    Ca    8.8      08 Mar 2019 08:00    TPro  6.7  /  Alb  3.1<L>  /  TBili  0.3  /  DBili  x   /  AST  13  /  ALT  6   /  AlkPhos  93  03-08                            9.4    3.76  )-----------( 137      ( 08 Mar 2019 08:00 )             31.6       Radiology    CT: < from: CT Angio Head w/ IV Cont (03.08.19 @ 10:26) >  Impression: CTA of the neck demonstrates no significant stenosis.    CT angiogram of the [place] demonstrates no significant stenosis   or aneurysms.    < end of copied text >    MRI  EKG:  tele:  TTE:  EEG: JUNAID [id number] is a 89y old  Male who presents with a chief complaint of     HPI: 89 yo M with PMH of a-fib on Eliquis (recently held due to TURBT procedure for bladder tumor), HTN< HLD, hx of acute ischemic stroke in the past, PPM, presents to the ER for left sided numbness and weakness since this morning. LKW time 3/7/19 at 8:30 pm. Patient reports that he woke up at 5:30 am 3/8/19, and he woke up with left leg pain, along with left leg numbness and left arm numbness. Numbness then progressed to numbness and weakness in the left arm and left leg. He also endorsed dizziness. In the ER, his symptoms began to improve. NIHSS 1. MRS 0.    MEDICATIONS  (STANDING):    MEDICATIONS  (PRN):    PAST MEDICAL & SURGICAL HISTORY:  Memory loss  Murmur, cardiac  UTI (urinary tract infection): as of 2-20-19, patient on cipro  Hearing loss: bilateral  Snoring: MAYA precautions -- responds affirmatively to STOP BANG questionnaire  Alcohol abuse: quit in 2012 after first CVA -- had been heavy drinker  Irregular heart beat: Pacemaker--Medtronic (advisa) placed 10-16-15; model #A3SR01; serial number [id number]  Hypertension  Erosion of penile prosthesis  Bladder mass  Dementia  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  BPH (benign prostatic hypertrophy)  Hematuria  Asthma, mild intermittent: last use of inhalers greater than 6 months ago  Dyslipidemia  HTN (hypertension), benign  Atrial fibrillation: diagnosed approximately 2008,  treated medically, on coumadin  Pain: penile prosthesis removed due to erosion  Eye abnormality: post surgery had ptosis left eye (ptosis NOT from CVA)  Bladder tumor: cysto, TURBT  History of permanent cardiac pacemaker placement: Medtronic (advisa) placed 10-16-15; model # A3SR01; serial number [id number]  S/P TURP    FAMILY HISTORY:  Family history of breast cancer in sister (Sibling)  Family history of type II diabetes mellitus  Family history of myocardial infarction    Allergies    No Known Allergies    Intolerances        SHx - No smoking, No ETOH, No drug abuse      Review of Systems:    see hpi    Vital Signs Last 24 Hrs  T(C): 36.4 (08 Mar 2019 06:46), Max: 36.4 (08 Mar 2019 06:46)  T(F): 97.5 (08 Mar 2019 06:46), Max: 97.5 (08 Mar 2019 06:46)  HR: 84 (08 Mar 2019 06:46) (84 - 84)  BP: 173/92 (08 Mar 2019 06:46) (173/92 - 173/92)  BP(mean): --  RR: 17 (08 Mar 2019 06:46) (17 - 17)  SpO2: 100% (08 Mar 2019 06:46) (100% - 100%)    Neurological Exam:  Mental Status: Orientated to self, date and place.  Attention intact.  No dysarthria. Speech fluent.  Cranial Nerves:  EOMI, VFF, no nystagmus.    No facial asymmetry.  Tongue midline.          Strength: no drift b/l UE. arm rolling test shows mild orbiting around the left arm           Dysmetria: None to finger-nose-finger    Sensation: decreased sensation on left leg compare to right leg. sensation to LT intact in b/l UE.    Other:    03-08    139  |  104  |  14  ----------------------------<  95  4.1   |  27  |  1.07    Ca    8.8      08 Mar 2019 08:00    TPro  6.7  /  Alb  3.1<L>  /  TBili  0.3  /  DBili  x   /  AST  13  /  ALT  6   /  AlkPhos  93  03-08                            9.4    3.76  )-----------( 137      ( 08 Mar 2019 08:00 )             31.6       Radiology    CT: < from: CT Angio Head w/ IV Cont (03.08.19 @ 10:26) >  Impression: CTA of the neck demonstrates no significant stenosis.    CT angiogram of the [place] demonstrates no significant stenosis   or aneurysms.    < end of copied text >    MRI  EKG:  tele:  TTE:  EEG: JUNAID [id number] is a 89y old  Male who presents with a chief complaint of     HPI: 89 yo M with PMH of a-fib on Eliquis (recently held due to TURBT procedure for bladder tumor), HTN< HLD, hx of acute ischemic stroke in the past, PPM, presents to the ER for left sided numbness and weakness since this morning. LKW time 3/7/19 at 8:30 pm. Patient reports that he woke up at 5:30 am 3/8/19, and he woke up with left leg pain, along with left leg numbness and left arm numbness. Numbness then progressed to numbness and weakness in the left arm and left leg. He also endorsed dizziness. He was off Eliquis 3 days prior to his TURBT procedure (3/4/19), and has not yet resumed his Eliquis. In the ER, his symptoms began to improve. NIHSS 1. MRS 0.    MEDICATIONS  (STANDING):    MEDICATIONS  (PRN):    PAST MEDICAL & SURGICAL HISTORY:  Memory loss  Murmur, cardiac  UTI (urinary tract infection): as of 2-20-19, patient on cipro  Hearing loss: bilateral  Snoring: MAYA precautions -- responds affirmatively to STOP BANG questionnaire  Alcohol abuse: quit in 2012 after first CVA -- had been heavy drinker  Irregular heart beat: Pacemaker--Medtronic (advisa) placed 10-16-15; model #A3SR01; serial number [id number]  Hypertension  Erosion of penile prosthesis  Bladder mass  Dementia  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  BPH (benign prostatic hypertrophy)  Hematuria  Asthma, mild intermittent: last use of inhalers greater than 6 months ago  Dyslipidemia  HTN (hypertension), benign  Atrial fibrillation: diagnosed approximately 2008,  treated medically, on coumadin  Pain: penile prosthesis removed due to erosion  Eye abnormality: post surgery had ptosis left eye (ptosis NOT from CVA)  Bladder tumor: cysto, TURBT  History of permanent cardiac pacemaker placement: Medtronic (advisa) placed 10-16-15; model # A3SR01; serial number [id number]  S/P TURP    FAMILY HISTORY:  Family history of breast cancer in sister (Sibling)  Family history of type II diabetes mellitus  Family history of myocardial infarction    Allergies    No Known Allergies    Intolerances        SHx - No smoking, No ETOH, No drug abuse      Review of Systems:    see hpi    Vital Signs Last 24 Hrs  T(C): 36.4 (08 Mar 2019 06:46), Max: 36.4 (08 Mar 2019 06:46)  T(F): 97.5 (08 Mar 2019 06:46), Max: 97.5 (08 Mar 2019 06:46)  HR: 84 (08 Mar 2019 06:46) (84 - 84)  BP: 173/92 (08 Mar 2019 06:46) (173/92 - 173/92)  BP(mean): --  RR: 17 (08 Mar 2019 06:46) (17 - 17)  SpO2: 100% (08 Mar 2019 06:46) (100% - 100%)    Neurological Exam:  Mental Status: Orientated to self, date and place.  Attention intact.  No dysarthria. Speech fluent.  Cranial Nerves:  EOMI, VFF, no nystagmus.    No facial asymmetry.  Tongue midline.          Strength: no drift b/l UE. arm rolling test shows mild orbiting around the left arm           Dysmetria: None to finger-nose-finger    Sensation: decreased sensation on left leg compare to right leg. sensation to LT intact in b/l UE.    Other:    03-08    139  |  104  |  14  ----------------------------<  95  4.1   |  27  |  1.07    Ca    8.8      08 Mar 2019 08:00    TPro  6.7  /  Alb  3.1<L>  /  TBili  0.3  /  DBili  x   /  AST  13  /  ALT  6   /  AlkPhos  93  03-08                            9.4    3.76  )-----------( 137      ( 08 Mar 2019 08:00 )             31.6       Radiology    CT: < from: CT Angio Head w/ IV Cont (03.08.19 @ 10:26) >  Impression: CTA of the neck demonstrates no significant stenosis.    CT angiogram of the [place] demonstrates no significant stenosis   or aneurysms.    < end of copied text >    MRI  EKG:  tele:  TTE:  EEG:

## 2019-03-08 NOTE — H&P ADULT - PROBLEM SELECTOR PLAN 2
Admit to telemetry for monitoring   Serial EKGs  Trend cardiac enzymes   Hold Elliquis as per urology   CHADS2: 4 Admit to telemetry for monitoring   Serial EKGs  Trend cardiac enzymes   Hold Elliquis until urology follow up  CHADS2-Vasc: 5 Lifelong AC as indicated Left limb noted to be cool and dusky; unclear when started  Possible acute ischemic limb 2/2 emboli due to history of Afib while anticoagulation held for TURB  Vasc sx consult called  Arterial US ordered  Likely CTA of LE will be needed but had CTA H/N Today - will defer to vascular for timing  Heparin gtt ordered for Afib

## 2019-03-08 NOTE — H&P ADULT - ATTENDING COMMENTS
Patient was seen and examined personally by me. I have discussed the plan and reviewed the PA's note and agree with the above physical exam findings including assessment and plan except as indicated below. Lab and imagining reviewed.     89M with PMHx HTN, HLD, Afib (currently off AC due to urologic procedure), CVA x2, s/p PPM presented with acute onset LLE pain/numbness and Left sided hemiparesis. Left hemiparesis now resolved since arrival in ED, but LLE pain/numbness remain. CT head showing new chronic lacunar infarct. Labs unremarkable except for stable anemia. Hematuria, s/p dorantes with small clots. On exam patient had LLE tenderness on palpation. skin is cold and dusky below the knee on the left leg, unable to palpate pulses from Left DP and PT when compared to the Right. bedside doppler was unable to detect flow. Vascular consulted for concern of arterial emboli. Cant get CTA LE due to recent CTA H/N. will get US LE with doppler for evaluation. Start Hep gtt now. monitor hematuria and h/h.  following. TTE to evaluate for cardiac thrombus. CVA w/u per neurology (can't get MRI due to PPM), repeat CTH in 48hrs. c/w home meds, hold lasix.

## 2019-03-08 NOTE — ED ADULT TRIAGE NOTE - CHIEF COMPLAINT QUOTE
Pt brought in by EMS c/o left sided numbness/pain since 5am upon waking up. Pt s/p tumor removal from bladder on Monday. Speech clear. No droop noted. . TONIA for stroke eval. No stroke code called. PMHx bladder ca (not on chemo/radiation), CVA, dementia, htn, afib.

## 2019-03-08 NOTE — H&P ADULT - NEGATIVE GENERAL GENITOURINARY SYMPTOMS
no incontinence/no flank pain R/no bladder infections/no renal colic/no gas in urine/no flank pain L

## 2019-03-08 NOTE — ED PROVIDER NOTE - ATTENDING CONTRIBUTION TO CARE
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 Last known normal 8:30pm last night (11 hours pta). This morning woke up around 5am (2 hours pta) and was c/o numbness on the left side of the body as well as pain. Pt is mentating at his baseline per family members, does not have any change in his speech, and appears normal to them. Per family, pt has been c/o pain in the left leg since the procedure but had not c/o numbness until this morning. Pt lives at home with his wife and is independent of all ADLs and does not walk with assist. This morning pt had difficulty transferring from bed to chair due to gait instability. Of note, was taken off AC 3 days prior to procedure and is supposed to stay off until 2 weeks after. Denies any change in vision, headache, cp, sob, f/c, or recent illness. Still has dorantes catheter from procedure.  EXAM: Left eye droop, left UE/LE weakness 3/5 compared to right 5/5, heart RRR, lungs ctab, abd soft nontender.   MDM: concern for CVA. Off his anticoags for procedure so possible CVA due to this cause. Pt out of window for TPA as last known normal was approx 11 hours ago. Will obtain stroke workup, consult neuro, will need to be admitted for further care and mgmt.

## 2019-03-08 NOTE — H&P ADULT - NEGATIVE GENERAL SYMPTOMS
no chills/no sweating/no anorexia/no weight loss/no weight gain/no polyphagia/no polyuria/no fatigue/no malaise/no fever/no polydipsia

## 2019-03-08 NOTE — H&P ADULT - RS GEN PE MLT RESP DETAILS PC
normal/good air movement/respirations non-labored/airway patent/no subcutaneous emphysema/clear to auscultation bilaterally/no intercostal retractions/no rales/breath sounds equal/no chest wall tenderness/no rhonchi/no wheezes

## 2019-03-08 NOTE — H&P ADULT - NEGATIVE MUSCULOSKELETAL SYMPTOMS
no muscle cramps/no arm pain L/no arm pain R/no arthritis/no joint swelling/no myalgia/no stiffness/no muscle weakness/no neck pain/no back pain/no arthralgia

## 2019-03-08 NOTE — H&P ADULT - PROBLEM SELECTOR PROBLEM 1
Hemiparesis, unspecified hemiparesis etiology, unspecified laterality Cerebrovascular accident (CVA), unspecified mechanism

## 2019-03-08 NOTE — ED PROVIDER NOTE - PHYSICAL EXAMINATION
Gen: NAD, non-toxic, conversational  Eyes: PERRLA, EOMI   HENT: Normocephalic, atraumatic. External ears normal, no rhinorrhea, moist mucous membranes.   CV: RRR, no M/R/G  Resp: CTAB, non-labored, speaking without difficulty on room air  Abd: soft, non tender, non rigid, no guarding or rebound tenderness  Skin: dry, wwp   Neuro: as below

## 2019-03-08 NOTE — H&P ADULT - NEGATIVE GASTROINTESTINAL SYMPTOMS
no flatulence/no steatorrhea/no jaundice/no diarrhea/no change in bowel habits/no hematochezia/no hiccoughs/no melena/no nausea/no vomiting/no constipation

## 2019-03-08 NOTE — H&P ADULT - NEGATIVE OPHTHALMOLOGIC SYMPTOMS
no diplopia/no photophobia/no blurred vision R/no discharge R/no pain L/no irritation R/no lacrimation R/no blurred vision L/no discharge L/no lacrimation L/no pain R/no scleral injection L/no irritation L/no scleral injection R

## 2019-03-08 NOTE — H&P ADULT - PROBLEM SELECTOR PLAN 6
Patient to start full AC once cleared by urology, until then use SCDs. Due to recent TURB   called for consult - advising to keep Warner in place for now and wait for further follow up before resuming anticoagulation  Will need continued outpatient follow up with Dr. Zaragoza.

## 2019-03-08 NOTE — CONSULT NOTE ADULT - ASSESSMENT
89 yo M hx of Afib on xarelto (currently being held) and bladder mass s/p TURBT 3/4 now presenting with left sided weakness/numbness. Improving    - Leave dorantes catheter in place  - Monitor I/Os  - Will discuss with Dr. Rdz 89 yo M hx of Afib on eliquis (currently being held) and bladder mass s/p TURBT 3/4 now presenting with left sided weakness/numbness. Improving    - Leave dorantes catheter in place  - Monitor I/Os  - Will discuss with Dr. Rdz 89 yo M hx of Afib on eliquis (currently being held) and bladder mass s/p TURBT 3/4 now presenting with left sided weakness/numbness. Improving    - Leave dorantes catheter in place for now  - Okay to restart anticoagulation from a urologic perspective  - Monitor urine color; may need to irrigate prn  - Will follow 89 yo M hx of Afib on eliquis (currently being held) and bladder mass s/p TURBT 3/4 now presenting with left sided weakness/numbness. Improving    - Leave dorantes catheter in place for now. Likely voiding trial 3/10 or 3/11.  - Restart anticoagulation  - Monitor urine color; may need to irrigate prn  - Will follow

## 2019-03-08 NOTE — H&P ADULT - NEGATIVE CARDIOVASCULAR SYMPTOMS
no palpitations/no paroxysmal nocturnal dyspnea/no peripheral edema/no dyspnea on exertion/no orthopnea/no claudication/no chest pain

## 2019-03-08 NOTE — CONSULT NOTE ADULT - ASSESSMENT
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   Still has motor function.   On heparin gtt.    Explained to patient and family risks/benefits of an emergent declotting procedure. Though patient has baseline dementia, he verbalized that he would not want to undergo surgery at this time. Patient's wife and daughter, who are HCPs, are at bedside, and wants to discuss with patient and the rest of the family further before making a final decision. They understand that the longer that an operation is delayed, the higher likelihood of limb loss.     - Continue heparin gtt with therapeutic PTTs  - Keep NPO for now in case patient/family changes mind  - Vascular surgery available at [phone] if agreeable to surgery    Seen with Vascular Fellow, Dr. Rincon  Discussed with Dr. Thomas  Pager [phone]

## 2019-03-08 NOTE — CONSULT NOTE ADULT - SUBJECTIVE AND OBJECTIVE BOX
HPI  Mr Hernandez is a 89 yo M with hx of bladder tumor s/p TURBT 3/4 and hx Afib on xarelto. His xarelto was held in preparation of the procedure and has continued to be held postop. He presents to the hospital today due to numbness and weakness of his entire left side of his body that began suddenly this morning. He also reports a sharp pins and needles pain on his left side. In the ED, this has began to resolve and he now only notes numbness in his left leg from his calf to his foot. He denies any fevers or chills. He continues to have some discomfort at the urethral meatus from the dorantes and was scheduled for a follow-up procedure today with Dr. Rdz.    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  BPH (benign prostatic hypertrophy)  Hematuria  Asthma, mild intermittent: last use of inhalers greater than 6 months ago  Dyslipidemia  HTN (hypertension), benign  Atrial fibrillation: diagnosed approximately ,  treated medically, on coumadin  Pain: penile prosthesis removed due to erosion  Eye abnormality: post surgery had ptosis left eye (ptosis NOT from CVA)  Bladder tumor: cysto, TURBT  History of permanent cardiac pacemaker placement: Medtronic (advisa) placed 10-16-15; model # A3SR01; serial number [id number]  S/P TURP      MEDICATIONS  (STANDING):  aspirin Suppository 300 milliGRAM(s) Rectal daily    MEDICATIONS  (PRN):      FAMILY HISTORY:  Family history of breast cancer in sister (Sibling)  Family history of type II diabetes mellitus  Family history of myocardial infarction      Allergies  No Known Allergies      REVIEW OF SYSTEMS:   Otherwise negative as stated in HPI    Physical Exam  Vital signs  T(C): 36.1 (19 @ 11:20), Max: 36.4 (19 @ 06:46)  HR: 61 (19 @ 11:20)  BP: 188/88 (19 @ 11:20)  SpO2: 100% (19 @ 11:20)  Wt(kg): --      Gen:  NAD    Pulm:  No respiratory distress  	  GI:  S/ND/NT    :  Indwelling dorantes in place draining clear urine    Neuro:  CN 2-12 grossly intact  5/5 strength in upper extremities  5/5 with hip flexion  Decreased sensation at LLE from the mid-calf to foot  Normal sensation in RLE and upper extremities      LABS:   @ 08:00    WBC 3.76  / Hct 31.6  / SCr 1.07         139  |  104  |  14  ----------------------------<  95  4.1   |  27  |  1.07    Ca    8.8      08 Mar 2019 08:00    TPro  6.7  /  Alb  3.1<L>  /  TBili  0.3  /  DBili  x   /  AST  13  /  ALT  6   /  AlkPhos  93      PT/INR - ( 08 Mar 2019 08:00 )   PT: 13.5 SEC;   INR: 1.18          PTT - ( 08 Mar 2019 08:00 )  PTT:30.3 SEC  Urinalysis Basic - ( 08 Mar 2019 11:00 )    Color: YELLOW / Appearance: Lt TURBID / S.027 / pH: 7.0  Gluc: NEGATIVE / Ketone: NEGATIVE  / Bili: NEGATIVE / Urobili: NORMAL   Blood: LARGE / Protein: 100 / Nitrite: NEGATIVE   Leuk Esterase: SMALL / RBC: >50 / WBC 5-10   Sq Epi: x / Non Sq Epi: x / Bacteria: x      RADIOLOGY:  < from: CT Angio Head w/ IV Cont (19 @ 10:26) >  Multiple axial sections were performed from base of skull to vertex   without contrast enhancement. The patient was injected with Omnipaque 350   IV and multiple axial sections were performed through the neck and [place] for purposes of CT angiogram. Coronal and sagittal reconstructions   were performed. 3-D reconstructions were performed.    This exam is compared with prior noncontrast head CT performed on 2016 and CT angiogram of the neck and [place] performed on   2016.    Parenchymal volume loss and chronic microvascular ischemic changes are  identified.    Abnormal area of low-attenuation is again seen involving the left   posterior frontal/parietal cortical subcortical region. This is   compatible with an area encephalomalacia and gliosis secondary to an old   infarct.    Old lacunar infarct involving the left basal ganglia region is   identified. This is new when compared with the prior head CT though   appears chronic in nature.    There is no evidence acute hemorrhage mass or mass effect in the   posterior fossa or supratentorial region.    Evaluation of the osseous structures with the appropriate window appears   unremarkable    Calcification is seen involving both carotid bifurcation region.    Both distal common carotid arteries appear normal.    Evaluation of the proximal right internal/external carotid arteries   appear normal    There is slight decrease caliber seen involving the left internal carotid   artery seen though no significant stenosis is identified. Evaluation of   the proximal left external carotid artery appears normal.    Evaluation of both distal internal carotid arteries appear normal.   Hypoplastic left A1 segment is seen. Evaluation of both anterior   cerebral, middle cerebral, basilar and bilateral posterior cerebral   arteries appear normalwithout evidence of an aneurysm or significant   stenosis.    Impression: CTA of the neck demonstrates no significant stenosis.    CT angiogram of the [place] demonstrates no significant stenosis   or aneurysms.    < end of copied text > HPI  Mr Hernandez is a 89 yo M with hx of bladder tumor s/p TURBT 3/4 and hx Afib on eliquis. His eliquis was held in preparation of the procedure and has continued to be held postop. He presents to the hospital today due to numbness and weakness of his entire left side of his body that began suddenly this morning. He also reports a sharp pins and needles pain on his left side. In the ED, this has began to resolve and he now only notes numbness in his left leg from his calf to his foot. He denies any fevers or chills. He continues to have some discomfort at the urethral meatus from the dorantes and was scheduled for a follow-up procedure today with Dr. Rdz.    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  BPH (benign prostatic hypertrophy)  Hematuria  Asthma, mild intermittent: last use of inhalers greater than 6 months ago  Dyslipidemia  HTN (hypertension), benign  Atrial fibrillation: diagnosed approximately ,  treated medically, on coumadin  Pain: penile prosthesis removed due to erosion  Eye abnormality: post surgery had ptosis left eye (ptosis NOT from CVA)  Bladder tumor: cysto, TURBT  History of permanent cardiac pacemaker placement: Medtronic (advisa) placed 10-16-15; model # A3SR01; serial number [id number]  S/P TURP      MEDICATIONS  (STANDING):  aspirin Suppository 300 milliGRAM(s) Rectal daily    MEDICATIONS  (PRN):      FAMILY HISTORY:  Family history of breast cancer in sister (Sibling)  Family history of type II diabetes mellitus  Family history of myocardial infarction      Allergies  No Known Allergies      REVIEW OF SYSTEMS:   Otherwise negative as stated in HPI    Physical Exam  Vital signs  T(C): 36.1 (19 @ 11:20), Max: 36.4 (19 @ 06:46)  HR: 61 (19 @ 11:20)  BP: 188/88 (19 @ 11:20)  SpO2: 100% (19 @ 11:20)  Wt(kg): --      Gen:  NAD    Pulm:  No respiratory distress  	  GI:  S/ND/NT    :  Indwelling dorantes in place draining clear urine    Neuro:  CN 2-12 grossly intact  5/5 strength in upper extremities  5/5 with hip flexion  Decreased sensation at LLE from the mid-calf to foot  Normal sensation in RLE and upper extremities      LABS:   @ 08:00    WBC 3.76  / Hct 31.6  / SCr 1.07         139  |  104  |  14  ----------------------------<  95  4.1   |  27  |  1.07    Ca    8.8      08 Mar 2019 08:00    TPro  6.7  /  Alb  3.1<L>  /  TBili  0.3  /  DBili  x   /  AST  13  /  ALT  6   /  AlkPhos  93      PT/INR - ( 08 Mar 2019 08:00 )   PT: 13.5 SEC;   INR: 1.18          PTT - ( 08 Mar 2019 08:00 )  PTT:30.3 SEC  Urinalysis Basic - ( 08 Mar 2019 11:00 )    Color: YELLOW / Appearance: Lt TURBID / S.027 / pH: 7.0  Gluc: NEGATIVE / Ketone: NEGATIVE  / Bili: NEGATIVE / Urobili: NORMAL   Blood: LARGE / Protein: 100 / Nitrite: NEGATIVE   Leuk Esterase: SMALL / RBC: >50 / WBC 5-10   Sq Epi: x / Non Sq Epi: x / Bacteria: x      RADIOLOGY:  < from: CT Angio Head w/ IV Cont (19 @ 10:26) >  Multiple axial sections were performed from base of skull to vertex   without contrast enhancement. The patient was injected with Omnipaque 350   IV and multiple axial sections were performed through the neck and [place] for purposes of CT angiogram. Coronal and sagittal reconstructions   were performed. 3-D reconstructions were performed.    This exam is compared with prior noncontrast head CT performed on 2016 and CT angiogram of the neck and [place] performed on   2016.    Parenchymal volume loss and chronic microvascular ischemic changes are  identified.    Abnormal area of low-attenuation is again seen involving the left   posterior frontal/parietal cortical subcortical region. This is   compatible with an area encephalomalacia and gliosis secondary to an old   infarct.    Old lacunar infarct involving the left basal ganglia region is   identified. This is new when compared with the prior head CT though   appears chronic in nature.    There is no evidence acute hemorrhage mass or mass effect in the   posterior fossa or supratentorial region.    Evaluation of the osseous structures with the appropriate window appears   unremarkable    Calcification is seen involving both carotid bifurcation region.    Both distal common carotid arteries appear normal.    Evaluation of the proximal right internal/external carotid arteries   appear normal    There is slight decrease caliber seen involving the left internal carotid   artery seen though no significant stenosis is identified. Evaluation of   the proximal left external carotid artery appears normal.    Evaluation of both distal internal carotid arteries appear normal.   Hypoplastic left A1 segment is seen. Evaluation of both anterior   cerebral, middle cerebral, basilar and bilateral posterior cerebral   arteries appear normalwithout evidence of an aneurysm or significant   stenosis.    Impression: CTA of the neck demonstrates no significant stenosis.    CT angiogram of the [place] demonstrates no significant stenosis   or aneurysms.    < end of copied text >

## 2019-03-08 NOTE — H&P ADULT - NEGATIVE NEUROLOGICAL SYMPTOMS
no generalized seizures/no syncope/no vertigo/no loss of sensation/no loss of consciousness/no hemiparesis/no transient paralysis/no headache/no facial palsy/no tremors/no focal seizures

## 2019-03-08 NOTE — H&P ADULT - NSHPLABSRESULTS_GEN_ALL_CORE
9.4    3.76  )-----------( 137      ( 08 Mar 2019 08:00 )             31.6     03-08    139  |  104  |  14  ----------------------------<  95  4.1   |  27  |  1.07    Ca    8.8      08 Mar 2019 08:00    TPro  6.7  /  Alb  3.1<L>  /  TBili  0.3  /  DBili  x   /  AST  13  /  ALT  6   /  AlkPhos  93  03-08      EKG: ventricular paced rhythm @ 87 bpm with PVC's     CTA H/N: negative     CTH: Possible lacunar infarct

## 2019-03-08 NOTE — H&P ADULT - NEGATIVE PSYCHIATRIC SYMPTOMS
no suicidal ideation/no depression/no insomnia/no agitation/no anxiety/no memory loss/no visual hallucinations/no hyperactivity/no paranoia/no mood swings

## 2019-03-08 NOTE — ED PROVIDER NOTE - NS ED ROS FT
General: No fevers / chills  HENT: No head trauma, ear pain, runny nose, or sore throat  Eyes: No visual changes  CP: No chest pain, palpitations, or light headedness  Resp: No shortness of breath, no cough  GI: No abdominal pain, diarrhea, constipation, nausea, or vomiting  : No urinary fz, dysuria, or hematuria  Neuro: +numbness, +weakness

## 2019-03-08 NOTE — H&P ADULT - ASSESSMENT
89 y/o male with a PMHx of HTN, HLD, A. 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 Patient admitted to telemetry for further workup to rule out CVA.

## 2019-03-08 NOTE — H&P ADULT - VASCULAR
Patient seen and examined at bedside 6/11.  No abdominal complaints.  Abdomen soft NT ND.    No need for surgical intervention at this time for rectus sheath hematoma.  Management as per primary team. detailed exam

## 2019-03-08 NOTE — H&P ADULT - GASTROINTESTINAL DETAILS
no masses palpable/bowel sounds normal/no bruit/no rigidity/nontender/no distention/no guarding/no organomegaly/normal/no rebound tenderness/soft

## 2019-03-08 NOTE — H&P ADULT - NEGATIVE ENMT SYMPTOMS
no ear pain/no recurrent cold sores/no throat pain/no dysphagia/no tinnitus/no nasal discharge/no post-nasal discharge/no nose bleeds/no dry mouth/no nasal congestion/no nasal obstruction/no abnormal taste sensation/no gum bleeding/no vertigo/no sinus symptoms

## 2019-03-08 NOTE — H&P ADULT - PSH
Bladder tumor  cysto, TURBT  Eye abnormality  post surgery had ptosis left eye (ptosis NOT from CVA)  History of permanent cardiac pacemaker placement  Medtronic (advisa) placed 10-16-15; model # A3SR01; serial number [id number]  Pain  penile prosthesis removed due to erosion  S/P TURP

## 2019-03-08 NOTE — H&P ADULT - NSHPSOCIALHISTORY_GEN_ALL_CORE
Marital Status:      Living Situation: Lives with wife and daughter in house     Occupation: Retired (Prior [employer])    Tobacco Use: Prior smoker - 100 pack years     Substance Use: Denies     Alcohol Use: 3 drinks - 1 bottle of scotch every day x 40 years (quit in 2012 after CVA)     Ambulates: With minimal assistance Marital Status:      Living Situation: Lives with wife and daughter in house     Occupation: Retired (Prior [employer])    Tobacco Use: Prior smoker - 100 pack years     Substance Use: Denies     Alcohol Use: 3 drinks - 1 bottle of scotch every day x 40 years (quit in 2012 after CVA) Denies CAGE questions    Ambulates: With minimal assistance

## 2019-03-08 NOTE — H&P ADULT - HISTORY OF PRESENT ILLNESS
89 y/o male with a PMHx of HTN, HLD, A. 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 Patient reports severe 10/10 that began at his ankle and then radiated all the way up the left side of his body including his entire LUE. Pain began when patient was laying down in his bed. Patients wife then helped him out of bed into a chair and called an ambulance. Patient then reported feeling numbness and tingling throughout the left side of his body. He also reported weakness to the left leg and felt as though he was unable to stand up or walk. At baseline patient ambulates with minimal assistance. Patients wife reports she gave him Tylenol before arrival to the ED but it did not provide him with any relief. Patient denies N/V/D, chest pain, SOB, headache, dizziness, syncope, diaphoresis, changes in vision, and abdominal pain. Since arrival to the ED patients pain and weakness has improved but not resolved completely. Patient still feels numbness from his left foot up to his left hip.

## 2019-03-08 NOTE — H&P ADULT - PMH
Alcohol abuse  quit in 2012 after first CVA -- had been heavy drinker  Asthma, mild intermittent  last use of inhalers greater than 6 months ago  Atrial fibrillation  diagnosed approximately 2008,  treated medically, on coumadin  Bladder mass    BPH (benign prostatic hypertrophy)    CVA (cerebral infarction)  9/2012 with visually disturbances  Dementia    Dyslipidemia    Erosion of penile prosthesis    Hearing loss  bilateral  Hematuria    HTN (hypertension), benign    Hypertension    Irregular heart beat  Pacemaker--Medtronic (advisa) placed 10-16-15; model #A3SR01; serial number [id number]  Memory loss    Murmur, cardiac    Snoring  MAYA precautions -- responds affirmatively to STOP BANG questionnaire  UTI (urinary tract infection)  as of 2-20-19, patient on cipro

## 2019-03-08 NOTE — ED PROVIDER NOTE - PMH
Alcohol abuse  quit in 2012 after first CVA -- had been heavy drinker  Asthma, mild intermittent  last use of inhalers greater than 6 months ago  Atrial fibrillation  diagnosed approximately 2008,  treated medically, on coumadin  Bladder mass    BPH (benign prostatic hypertrophy)    CVA (cerebral infarction)  9/2012 with visually disturbances  Dementia    Dyslipidemia    Erosion of penile prosthesis    Hearing loss  bilateral  Hematuria    HTN (hypertension), benign    Hypertension    Irregular heart beat  Pacemaker--Medtronic (advisa) placed 10-16-15; model #A3SR01; serial number [id number]  Memory loss    Murmur, cardiac    Snoring  MAYA precautions -- responds affirmatively to STOP BANG questionnaire  UTI (urinary tract infection)  as of 2-20-19, patient on cipro

## 2019-03-08 NOTE — H&P ADULT - PROBLEM SELECTOR PLAN 5
consult urology Due to recent TURB   called for consult - advising to keep Warner in place for now and wait for further follow up before resuming anticoagulation  Will need continued outpatient follow up with Dr. Zaragoza. Continue statin   FLP

## 2019-03-08 NOTE — H&P ADULT - PROBLEM SELECTOR PLAN 3
Continue Metoprolol, donepezil   Monitor BP and adjust meds as needed   DASH diet Admit to telemetry for monitoring   Serial EKGs  Trend cardiac enzymes   Hold Elliquis until urology follow up  CHADS2-Vasc: 5 Lifelong AC as indicated

## 2019-03-08 NOTE — CONSULT NOTE ADULT - ASSESSMENT
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    Plan:  -c/w Eliquis from stroke perspective, but decision should be conferred with urology given recent urological surgery  -MRI brain w/o contrast  -HbA1c and LDL   -PT/OT/PMR Impression: Could have new R thalamocapsular infarct with improving symptoms. Could also be R MCA distribution infarct as well. Etiology is cardioembolic in the setting of afib. CTA head and neck unremarkable for large vessel disease    Plan:  -recommend starting heparin gtt for 48 hours, and if without severe bleeding, patient could be restarted on Eliquis, if cleared from urological and medicine perspective given recent urological surgery  -repeat CT head scan in 48 hours to assess for acute ischemic infarct  -TTE to assess for possible valvular cause of atrial fibrillation  -PT/OT/speech and swallow

## 2019-03-08 NOTE — PHYSICAL THERAPY INITIAL EVALUATION ADULT - PLANNED THERAPY INTERVENTIONS, PT EVAL
stairs training/balance training/bed mobility training/strengthening/transfer training/gait training

## 2019-03-08 NOTE — ED PROVIDER NOTE - CLINICAL SUMMARY MEDICAL DECISION MAKING FREE TEXT BOX
Rule-out stroke, though out of the window for TPA based on family members last known normal of 8:30pm prior to bed last night. Pt appears well, A&O x 2-3 (his baseline), on neuro exam has numbness and weakness in the left upper and lower extremity concerning for right sided ischemic event. Pt is also currently off AC 2/2 procedure. Will check basic labs, neuro consult, Ct/Cta pending, will likely stay for MRI and continued care. No signs of infectious etiologies, HD stable and afebrile.   Malinda Jones, PGY-2 EM

## 2019-03-08 NOTE — H&P ADULT - NEUROLOGICAL DETAILS
responds to verbal commands/cranial nerves intact/sensation intact/strength decreased/b/l LE/alert and oriented x 3/responds to pain strength decreased/sensation intact/cranial nerves intact/alert and oriented x 3/responds to verbal commands

## 2019-03-08 NOTE — ED PROVIDER NOTE - PSH
Bladder tumor  cysto, TURBT  Eye abnormality  post surgery had ptosis left eye (ptosis NOT from CVA)  History of permanent cardiac pacemaker placement  Medtronic (advisa) placed 10-16-15; model # A3SR01; serial number [id number]  Pain  penile prosthesis removed due to erosion  S/P TURP

## 2019-03-08 NOTE — CHART NOTE - NSCHARTNOTEFT_GEN_A_CORE
Called by Vascular requesting urgent CTA A/P c LE run-off. Called Radiology and advised that despite CTA H/N performed earlier today there is no contraindication to subsequent study and test is protocoled. Patient transported to CT scan in ED by myself and RN to expedite test. Dr. Thomas made aware of plan of care.

## 2019-03-08 NOTE — H&P ADULT - NEGATIVE SKIN SYMPTOMS
no itching/no dryness/no hair loss/no rash/no change in size/color of mole/no tumor/no pitted nails/no brittle nails

## 2019-03-08 NOTE — ED ADULT NURSE REASSESSMENT NOTE - NS ED NURSE REASSESS COMMENT FT1
pt is in bed A and OX 3 forgetful at times. family at bedside, on monitor with VSS, PERRLA extremities are weak but equal.  pt is now admitted to telemetry. personal care performed pt is in bed A and OX 3 forgetful at times. family at bedside, on monitor with VSS, PERRLA extremities are weak but equal.  pt is now admitted to telemetry. personal care performed.  Warner catheter noted 20 fr draining blood tinged urine by gravity. no blood clots noted.

## 2019-03-08 NOTE — H&P ADULT - PROBLEM SELECTOR PLAN 1
Admit to telemetry for monitoring   Consult neurology   MRI   PT consult Admit to tele  Initial CTA H/N without intra/extracranial stenosis but showed multiple areas of possible CVA new from previous imaging  Patient was off Eliquis for ~1 week due to TURB so likely caused by underlying Afib  Neurology consultation appreciated - will check MRI Non-con  No need for permissive HTN  Patient passed bedside dysphagia screen - official S/S ordered  PT/OT  Aspirin/Statin  Per Neuro will await  Clearance for Anticoagulation prior to Hep gtt/Eliquis  A1C and LIpid profile

## 2019-03-09 ENCOUNTER — RESULT REVIEW (OUTPATIENT)
Age: 84
End: 2019-03-09

## 2019-03-09 DIAGNOSIS — I99.8 OTHER DISORDER OF CIRCULATORY SYSTEM: ICD-10-CM

## 2019-03-09 DIAGNOSIS — I74.9 EMBOLISM AND THROMBOSIS OF UNSPECIFIED ARTERY: ICD-10-CM

## 2019-03-09 LAB
ANION GAP SERPL CALC-SCNC: 11 MMO/L — SIGNIFICANT CHANGE UP (ref 7–14)
ANION GAP SERPL CALC-SCNC: 12 MMO/L — SIGNIFICANT CHANGE UP (ref 7–14)
APTT BLD: 154.8 SEC — CRITICAL HIGH (ref 27.5–36.3)
APTT BLD: 79.9 SEC — HIGH (ref 27.5–36.3)
BLD GP AB SCN SERPL QL: NEGATIVE — SIGNIFICANT CHANGE UP
BUN SERPL-MCNC: 10 MG/DL — SIGNIFICANT CHANGE UP (ref 7–23)
BUN SERPL-MCNC: 11 MG/DL — SIGNIFICANT CHANGE UP (ref 7–23)
CALCIUM SERPL-MCNC: 8.8 MG/DL — SIGNIFICANT CHANGE UP (ref 8.4–10.5)
CALCIUM SERPL-MCNC: 9.1 MG/DL — SIGNIFICANT CHANGE UP (ref 8.4–10.5)
CHLORIDE SERPL-SCNC: 104 MMOL/L — SIGNIFICANT CHANGE UP (ref 98–107)
CHLORIDE SERPL-SCNC: 104 MMOL/L — SIGNIFICANT CHANGE UP (ref 98–107)
CHOLEST SERPL-MCNC: 100 MG/DL — LOW (ref 120–199)
CO2 SERPL-SCNC: 25 MMOL/L — SIGNIFICANT CHANGE UP (ref 22–31)
CO2 SERPL-SCNC: 27 MMOL/L — SIGNIFICANT CHANGE UP (ref 22–31)
CREAT SERPL-MCNC: 0.96 MG/DL — SIGNIFICANT CHANGE UP (ref 0.5–1.3)
CREAT SERPL-MCNC: 1.06 MG/DL — SIGNIFICANT CHANGE UP (ref 0.5–1.3)
GLUCOSE SERPL-MCNC: 101 MG/DL — HIGH (ref 70–99)
GLUCOSE SERPL-MCNC: 94 MG/DL — SIGNIFICANT CHANGE UP (ref 70–99)
HBA1C BLD-MCNC: 5.4 % — SIGNIFICANT CHANGE UP (ref 4–5.6)
HCT VFR BLD CALC: 30.2 % — LOW (ref 39–50)
HCT VFR BLD CALC: 31.4 % — LOW (ref 39–50)
HDLC SERPL-MCNC: 52 MG/DL — SIGNIFICANT CHANGE UP (ref 35–55)
HGB BLD-MCNC: 9 G/DL — LOW (ref 13–17)
HGB BLD-MCNC: 9 G/DL — LOW (ref 13–17)
INR BLD: 1.2 — HIGH (ref 0.88–1.17)
LIPID PNL WITH DIRECT LDL SERPL: 46 MG/DL — SIGNIFICANT CHANGE UP
MAGNESIUM SERPL-MCNC: 1.7 MG/DL — SIGNIFICANT CHANGE UP (ref 1.6–2.6)
MAGNESIUM SERPL-MCNC: 1.7 MG/DL — SIGNIFICANT CHANGE UP (ref 1.6–2.6)
MCHC RBC-ENTMCNC: 25.9 PG — LOW (ref 27–34)
MCHC RBC-ENTMCNC: 26.2 PG — LOW (ref 27–34)
MCHC RBC-ENTMCNC: 28.7 % — LOW (ref 32–36)
MCHC RBC-ENTMCNC: 29.8 % — LOW (ref 32–36)
MCV RBC AUTO: 86.8 FL — SIGNIFICANT CHANGE UP (ref 80–100)
MCV RBC AUTO: 91.3 FL — SIGNIFICANT CHANGE UP (ref 80–100)
NRBC # FLD: 0 K/UL — LOW (ref 25–125)
NRBC # FLD: 0 K/UL — LOW (ref 25–125)
PHOSPHATE SERPL-MCNC: 2.9 MG/DL — SIGNIFICANT CHANGE UP (ref 2.5–4.5)
PLATELET # BLD AUTO: 128 K/UL — LOW (ref 150–400)
PLATELET # BLD AUTO: 130 K/UL — LOW (ref 150–400)
PMV BLD: 11.3 FL — SIGNIFICANT CHANGE UP (ref 7–13)
PMV BLD: 11.5 FL — SIGNIFICANT CHANGE UP (ref 7–13)
POTASSIUM SERPL-MCNC: 3.5 MMOL/L — SIGNIFICANT CHANGE UP (ref 3.5–5.3)
POTASSIUM SERPL-MCNC: 3.6 MMOL/L — SIGNIFICANT CHANGE UP (ref 3.5–5.3)
POTASSIUM SERPL-SCNC: 3.5 MMOL/L — SIGNIFICANT CHANGE UP (ref 3.5–5.3)
POTASSIUM SERPL-SCNC: 3.6 MMOL/L — SIGNIFICANT CHANGE UP (ref 3.5–5.3)
PROTHROM AB SERPL-ACNC: 13.7 SEC — HIGH (ref 9.8–13.1)
RBC # BLD: 3.44 M/UL — LOW (ref 4.2–5.8)
RBC # BLD: 3.48 M/UL — LOW (ref 4.2–5.8)
RBC # FLD: 16.7 % — HIGH (ref 10.3–14.5)
RBC # FLD: 16.8 % — HIGH (ref 10.3–14.5)
RH IG SCN BLD-IMP: POSITIVE — SIGNIFICANT CHANGE UP
SODIUM SERPL-SCNC: 141 MMOL/L — SIGNIFICANT CHANGE UP (ref 135–145)
SODIUM SERPL-SCNC: 142 MMOL/L — SIGNIFICANT CHANGE UP (ref 135–145)
TRIGL SERPL-MCNC: 38 MG/DL — SIGNIFICANT CHANGE UP (ref 10–149)
WBC # BLD: 4.03 K/UL — SIGNIFICANT CHANGE UP (ref 3.8–10.5)
WBC # BLD: 5.75 K/UL — SIGNIFICANT CHANGE UP (ref 3.8–10.5)
WBC # FLD AUTO: 4.03 K/UL — SIGNIFICANT CHANGE UP (ref 3.8–10.5)
WBC # FLD AUTO: 5.75 K/UL — SIGNIFICANT CHANGE UP (ref 3.8–10.5)

## 2019-03-09 PROCEDURE — 88304 TISSUE EXAM BY PATHOLOGIST: CPT | Mod: 26

## 2019-03-09 PROCEDURE — 34201 REMOVAL OF ARTERY CLOT: CPT | Mod: LT,59

## 2019-03-09 PROCEDURE — 71045 X-RAY EXAM CHEST 1 VIEW: CPT | Mod: 26

## 2019-03-09 PROCEDURE — 35302 RECHANNELING OF ARTERY: CPT | Mod: LT

## 2019-03-09 PROCEDURE — 99233 SBSQ HOSP IP/OBS HIGH 50: CPT

## 2019-03-09 RX ORDER — SODIUM CHLORIDE 9 MG/ML
1000 INJECTION, SOLUTION INTRAVENOUS
Qty: 0 | Refills: 0 | Status: DISCONTINUED | OUTPATIENT
Start: 2019-03-09 | End: 2019-03-14

## 2019-03-09 RX ORDER — POTASSIUM CHLORIDE 20 MEQ
10 PACKET (EA) ORAL ONCE
Qty: 0 | Refills: 0 | Status: COMPLETED | OUTPATIENT
Start: 2019-03-09 | End: 2019-03-09

## 2019-03-09 RX ORDER — ATROPA BELLADONNA AND OPIUM 16.2; 6 MG/1; MG/1
1 SUPPOSITORY RECTAL EVERY 6 HOURS
Qty: 0 | Refills: 0 | Status: DISCONTINUED | OUTPATIENT
Start: 2019-03-09 | End: 2019-03-12

## 2019-03-09 RX ORDER — HEPARIN SODIUM 5000 [USP'U]/ML
600 INJECTION INTRAVENOUS; SUBCUTANEOUS
Qty: 25000 | Refills: 0 | Status: DISCONTINUED | OUTPATIENT
Start: 2019-03-09 | End: 2019-03-10

## 2019-03-09 RX ORDER — HYDROMORPHONE HYDROCHLORIDE 2 MG/ML
0.25 INJECTION INTRAMUSCULAR; INTRAVENOUS; SUBCUTANEOUS
Qty: 0 | Refills: 0 | Status: DISCONTINUED | OUTPATIENT
Start: 2019-03-09 | End: 2019-03-10

## 2019-03-09 RX ORDER — SODIUM CHLORIDE 9 MG/ML
1000 INJECTION INTRAMUSCULAR; INTRAVENOUS; SUBCUTANEOUS
Qty: 0 | Refills: 0 | Status: DISCONTINUED | OUTPATIENT
Start: 2019-03-09 | End: 2019-03-09

## 2019-03-09 RX ORDER — MAGNESIUM SULFATE 500 MG/ML
1 VIAL (ML) INJECTION ONCE
Qty: 0 | Refills: 0 | Status: COMPLETED | OUTPATIENT
Start: 2019-03-09 | End: 2019-03-09

## 2019-03-09 RX ORDER — LIDOCAINE 4 G/100G
1 CREAM TOPICAL EVERY 4 HOURS
Qty: 0 | Refills: 0 | Status: DISCONTINUED | OUTPATIENT
Start: 2019-03-09 | End: 2019-03-12

## 2019-03-09 RX ORDER — AMIODARONE HYDROCHLORIDE 400 MG/1
100 TABLET ORAL DAILY
Qty: 0 | Refills: 0 | Status: DISCONTINUED | OUTPATIENT
Start: 2019-03-09 | End: 2019-03-10

## 2019-03-09 RX ORDER — ACETAMINOPHEN 500 MG
1000 TABLET ORAL ONCE
Qty: 0 | Refills: 0 | Status: COMPLETED | OUTPATIENT
Start: 2019-03-09 | End: 2019-03-09

## 2019-03-09 RX ADMIN — Medication 20 MILLIGRAM(S): at 06:35

## 2019-03-09 RX ADMIN — HEPARIN SODIUM 6 UNIT(S)/HR: 5000 INJECTION INTRAVENOUS; SUBCUTANEOUS at 08:58

## 2019-03-09 RX ADMIN — SODIUM CHLORIDE 75 MILLILITER(S): 9 INJECTION INTRAMUSCULAR; INTRAVENOUS; SUBCUTANEOUS at 11:07

## 2019-03-09 RX ADMIN — Medication 100 MILLIGRAM(S): at 22:29

## 2019-03-09 RX ADMIN — Medication 100 MILLIEQUIVALENT(S): at 11:09

## 2019-03-09 RX ADMIN — Medication 100 MILLIGRAM(S): at 06:35

## 2019-03-09 RX ADMIN — TAMSULOSIN HYDROCHLORIDE 0.4 MILLIGRAM(S): 0.4 CAPSULE ORAL at 22:29

## 2019-03-09 RX ADMIN — SENNA PLUS 2 TABLET(S): 8.6 TABLET ORAL at 22:30

## 2019-03-09 RX ADMIN — SODIUM CHLORIDE 50 MILLILITER(S): 9 INJECTION, SOLUTION INTRAVENOUS at 22:35

## 2019-03-09 RX ADMIN — HEPARIN SODIUM 7 UNIT(S)/HR: 5000 INJECTION INTRAVENOUS; SUBCUTANEOUS at 21:52

## 2019-03-09 RX ADMIN — HEPARIN SODIUM 1300 UNIT(S)/HR: 5000 INJECTION INTRAVENOUS; SUBCUTANEOUS at 00:09

## 2019-03-09 RX ADMIN — ATROPA BELLADONNA AND OPIUM 1 SUPPOSITORY(S): 16.2; 6 SUPPOSITORY RECTAL at 23:55

## 2019-03-09 RX ADMIN — ATORVASTATIN CALCIUM 80 MILLIGRAM(S): 80 TABLET, FILM COATED ORAL at 22:29

## 2019-03-09 RX ADMIN — PANTOPRAZOLE SODIUM 40 MILLIGRAM(S): 20 TABLET, DELAYED RELEASE ORAL at 06:35

## 2019-03-09 RX ADMIN — Medication 100 GRAM(S): at 11:06

## 2019-03-09 RX ADMIN — AMIODARONE HYDROCHLORIDE 100 MILLIGRAM(S): 400 TABLET ORAL at 06:36

## 2019-03-09 RX ADMIN — DONEPEZIL HYDROCHLORIDE 10 MILLIGRAM(S): 10 TABLET, FILM COATED ORAL at 23:59

## 2019-03-09 RX ADMIN — Medication 400 MILLIGRAM(S): at 23:48

## 2019-03-09 NOTE — OCCUPATIONAL THERAPY INITIAL EVALUATION ADULT - PLANNED THERAPY INTERVENTIONS, OT EVAL
balance training/ROM/strengthening/ADL retraining/bed mobility training/motor coordination training/transfer training/neuromuscular re-education

## 2019-03-09 NOTE — PROGRESS NOTE ADULT - PROBLEM SELECTOR PLAN 2
CTA LE showing complete occlusion of L femoral artery   vacular following- scheduled for clot removal in OR today. will f/u post-op recs  cont Heparin gtt for now

## 2019-03-09 NOTE — SWALLOW BEDSIDE ASSESSMENT ADULT - COMMENTS
SLP attempted to see patient however as per chart note and nursing, patient scheduled for surgery this afternoon and therefore is NPO for procedure. This department to reattempt as schedule permits.

## 2019-03-09 NOTE — PROGRESS NOTE ADULT - SUBJECTIVE AND OBJECTIVE BOX
Patient is a 89y old  Male who presents with a chief complaint of Possible CVA (09 Mar 2019 16:30)      SUBJECTIVE / OVERNIGHT EVENTS:    Pt found to have complete occlusion of L femoral artery o/n, now scheduled for OR today for clot removal. Still has LLE pain and weakness same as yesterday, as well as gross hematuria     Review of Systems:    RESPIRATORY: No cough, wheezing, chills or hemoptysis; No shortness of breath  CARDIOVASCULAR: No chest pain, palpitations, dizziness, or leg swelling  GASTROINTESTINAL: No abdominal or epigastric pain. No nausea, vomiting, or hematemesis; No diarrhea or constipation. No melena or hematochezia.      MEDICATIONS  (STANDING):  amiodarone    Tablet 100 milliGRAM(s) Oral daily  atorvastatin 80 milliGRAM(s) Oral at bedtime  docusate sodium 100 milliGRAM(s) Oral three times a day  donepezil 10 milliGRAM(s) Oral at bedtime  folic acid 1 milliGRAM(s) Oral daily  furosemide    Tablet 20 milliGRAM(s) Oral daily  heparin  Infusion 600 Unit(s)/Hr (6 mL/Hr) IV Continuous <Continuous>  metoprolol tartrate 100 milliGRAM(s) Oral two times a day  montelukast 10 milliGRAM(s) Oral daily  pantoprazole    Tablet 40 milliGRAM(s) Oral before breakfast  senna 2 Tablet(s) Oral at bedtime  sodium chloride 0.9%. 1000 milliLiter(s) (75 mL/Hr) IV Continuous <Continuous>  tamsulosin 0.4 milliGRAM(s) Oral at bedtime    MEDICATIONS  (PRN):      PHYSICAL EXAM:  T(C): 37.3 (19 @ 15:14), Max: 37.3 (19 @ 10:05)  HR: 62 (19 @ 15:14) (61 - 99)  BP: 165/83 (19 @ 15:14) (140/77 - 172/88)  RR: 18 (19 @ 15:14) (17 - 18)  SpO2: 100% (19 @ 15:14) (99% - 100%)  I&O's Summary    08 Mar 2019 07:01  -  09 Mar 2019 07:00  --------------------------------------------------------  IN: 0 mL / OUT: 1500 mL / NET: -1500 mL      GENERAL: elderly male  lying in bed in NAD   MENTAL STATUS/PSYCH:  AAO x2-3   HEAD:  Atraumatic, Normocephalic  EYES: L eye ptosis EOMI, PERRLA, conjunctiva and sclera clear  NECK: Supple, No elevated JVD  CHEST/LUNG: Clear to auscultation bilaterally; No wheeze  HEART: Regular rate and rhythm; No murmurs, rubs, or gallops  ABDOMEN: Soft, Nontender, Nondistended; Bowel sounds present. dorantes in place draining bloody urine   EXTREMITIES:  b/l LE cool to touch, unable to palpate DP pulse  NEUROLOGY: CN II-XII grossly intact, moving all extremities  SKIN: No rashes or lesions    LABS:  CAPILLARY BLOOD GLUCOSE                              9.0    4.03  )-----------( 130      ( 09 Mar 2019 07:00 )             30.2     03-    141  |  104  |  11  ----------------------------<  101<H>  3.5   |  25  |  0.96    Ca    8.8      09 Mar 2019 07:00  Mg     1.7     -    TPro  6.7  /  Alb  3.1<L>  /  TBili  0.3  /  DBili  x   /  AST  13  /  ALT  6   /  AlkPhos  93  03-08    PT/INR - ( 08 Mar 2019 08:00 )   PT: 13.5 SEC;   INR: 1.18          PTT - ( 09 Mar 2019 07:00 )  PTT:154.8 SEC      Urinalysis Basic - ( 08 Mar 2019 11:00 )    Color: YELLOW / Appearance: Lt TURBID / S.027 / pH: 7.0  Gluc: NEGATIVE / Ketone: NEGATIVE  / Bili: NEGATIVE / Urobili: NORMAL   Blood: LARGE / Protein: 100 / Nitrite: NEGATIVE   Leuk Esterase: SMALL / RBC: >50 / WBC 5-10   Sq Epi: x / Non Sq Epi: x / Bacteria: x        RADIOLOGY & ADDITIONAL TESTS:    Imaging Personally Reviewed:    Consultant(s) Notes Reviewed:      Care Discussed with Consultants/Other Providers:

## 2019-03-09 NOTE — PROGRESS NOTE ADULT - SUBJECTIVE AND OBJECTIVE BOX
Subjective - Pt started on hep gtt and started to have hematuria overnight. Changed pt dorantes to 3 way for possible irrigations.     Objective    Vital signs  T(F): , Max: 99.2 (03-09-19 @ 10:05)  HR: 62 (03-09-19 @ 15:14)  BP: 165/83 (03-09-19 @ 15:14)  SpO2: 100% (03-09-19 @ 15:14)  Wt(kg): --    Output     03-08 @ 07:01  -  03-09 @ 07:00  --------------------------------------------------------  IN: 0 mL / OUT: 1500 mL / NET: -1500 mL        Gen nad  Abd soft. NT   Dorantes in place draining pink urine after exchange    Labs      03-09 @ 07:00    WBC 4.03  / Hct 30.2  / SCr 0.96     03-08 @ 23:00    WBC 4.00  / Hct 32.0  / SCr --

## 2019-03-09 NOTE — PROGRESS NOTE ADULT - PROBLEM SELECTOR PLAN 6
Due to recent TURB- path + invasive urothelial carcinoma    called for consult - advising to keep Warner in place for now   Will need continued outpatient follow up with Dr. Zaragoza.

## 2019-03-09 NOTE — PROGRESS NOTE ADULT - ASSESSMENT
89 yo M hx of Afib on eliquis (currently being held) and bladder mass s/p TURBT 3/4 now presenting with left sided weakness/numbness. Improving    - Continue with dorantes  - Will monitor color while on hep gtt and irrigate PRN vs start CBI  - Anticoagulation per primary team  - Will follow

## 2019-03-09 NOTE — OCCUPATIONAL THERAPY INITIAL EVALUATION ADULT - PERTINENT HX OF CURRENT PROBLEM, REHAB EVAL
89 y/o male with a PMHx of HTN, HLD, A. 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 Patient admitted to telemetry for further workup to rule out CVA. 89 y/o male with a PMHx of HTN, HLD, A. 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 Patient admitted to telemetry for further workup to rule out CVA. Pt found to have left leg acute limb ischemia 2/2 occlusion of CFA thru proximal SFA and scheduled for sx 3/9.

## 2019-03-09 NOTE — PROGRESS NOTE ADULT - SUBJECTIVE AND OBJECTIVE BOX
Vascular Surgery Progress Note:    Pt and family now agreeable to surgery.    Subjective:  Patient examined at bedside.  No new c/o.    Objective:  Exam:  Gen: alert, oriented, in NAD  Neuro: motor and sensory grossly intact  Cards: regular  Resp: non-labored breathing  Pulses: Right - Palpable DP. Left - no DP/PT, popliteal pulse or signal, femoral pulse.  MSK: Can move left toes and dorsi/plantarflex    MEDICATIONS  (STANDING):  amiodarone    Tablet 100 milliGRAM(s) Oral daily  atorvastatin 80 milliGRAM(s) Oral at bedtime  docusate sodium 100 milliGRAM(s) Oral three times a day  donepezil 10 milliGRAM(s) Oral at bedtime  folic acid 1 milliGRAM(s) Oral daily  furosemide    Tablet 20 milliGRAM(s) Oral daily  heparin  Infusion 600 Unit(s)/Hr (6 mL/Hr) IV Continuous <Continuous>  metoprolol tartrate 100 milliGRAM(s) Oral two times a day  montelukast 10 milliGRAM(s) Oral daily  pantoprazole    Tablet 40 milliGRAM(s) Oral before breakfast  senna 2 Tablet(s) Oral at bedtime  tamsulosin 0.4 milliGRAM(s) Oral at bedtime    MEDICATIONS  (PRN):      Vital Signs Last 24 Hrs  T(C): 36.5 (09 Mar 2019 06:34), Max: 36.5 (08 Mar 2019 20:06)  T(F): 97.7 (09 Mar 2019 06:34), Max: 97.7 (08 Mar 2019 20:06)  HR: 61 (09 Mar 2019 06:34) (60 - 99)  BP: 150/82 (09 Mar 2019 06:34) (140/77 - 188/88)  BP(mean): --  RR: 17 (09 Mar 2019 06:34) (16 - 19)  SpO2: 99% (09 Mar 2019 06:34) (99% - 100%)    I&O's Detail    08 Mar 2019 07:01  -  09 Mar 2019 07:00  --------------------------------------------------------  IN:  Total IN: 0 mL    OUT:    Indwelling Catheter - Urethral: 1500 mL  Total OUT: 1500 mL    Total NET: -1500 mL          LABS:                        9.0    4.03  )-----------( 130      ( 09 Mar 2019 07:00 )             30.2     03-    141  |  104  |  11  ----------------------------<  101<H>  3.5   |  25  |  0.96    Ca    8.8      09 Mar 2019 07:00  Mg     1.7         TPro  6.7  /  Alb  3.1<L>  /  TBili  0.3  /  DBili  x   /  AST  13  /  ALT  6   /  AlkPhos  93  03-08    PT/INR - ( 08 Mar 2019 08:00 )   PT: 13.5 SEC;   INR: 1.18          PTT - ( 09 Mar 2019 07:00 )  PTT:154.8 SEC  Urinalysis Basic - ( 08 Mar 2019 11:00 )    Color: YELLOW / Appearance: Lt TURBID / S.027 / pH: 7.0  Gluc: NEGATIVE / Ketone: NEGATIVE  / Bili: NEGATIVE / Urobili: NORMAL   Blood: LARGE / Protein: 100 / Nitrite: NEGATIVE   Leuk Esterase: SMALL / RBC: >50 / WBC 5-10   Sq Epi: x / Non Sq Epi: x / Bacteria: x

## 2019-03-09 NOTE — PROGRESS NOTE ADULT - ASSESSMENT
89y Male with afib and recent cessation of DOAC in setting of TURBT presents with left acute limb ischemia secondary to occlusion of common femora through proximal SFA. Still has motor function. On heparin gtt.    Pt and family now agreeable to surgery, discussed risks/benefits of procedure with all (family and pt) at bedside, pt signed consent form for left leg angiogram, thrombectomy, possible bypass, possible fasciotomies. Consent in chart.    - Continue heparin gtt at 600 u/hr - no titration of drip. CONTINUE hep gtt into OR. DO NOT HOLD.  - Keep NPO.  - Pre-op labs done. 4u pRBC on hold for OR.  - Rec continuous bladder irrigation - discussed with urology need for hep gtt before and during OR and that pt will likely need to get pushes of hep in OR despite gross hematuria.    Pager [phone]

## 2019-03-09 NOTE — CHART NOTE - NSCHARTNOTEFT_GEN_A_CORE
PRE OPERATIVE NOTE    Pre-op Diagnosis: Left leg acute limb ischemia 2/2 occlusion of CFA thru proximal SFA  Procedure: Left leg angiogram, thrombectomy, possible bypass, possible fasciotomies  Surgeon: Martha Arteaga    4.03  )-----------( 130      ( 09 Mar 2019 07:00 )             30.2     03-09    141  |  104  |  11  ----------------------------<  101<H>  3.5   |  25  |  0.96    Ca    8.8      09 Mar 2019 07:00  Mg     1.7     03-09    TPro  6.7  /  Alb  3.1<L>  /  TBili  0.3  /  DBili  x   /  AST  13  /  ALT  6   /  AlkPhos  93  03-08    LIVER FUNCTIONS - ( 08 Mar 2019 08:00 )  Alb: 3.1 g/dL / Pro: 6.7 g/dL / ALK PHOS: 93 u/L / ALT: 6 u/L / AST: 13 u/L / GGT: x           PT/INR - ( 08 Mar 2019 08:00 )   PT: 13.5 SEC;   INR: 1.18          PTT - ( 09 Mar 2019 07:00 )  PTT:154.8 SEC  CAPILLARY BLOOD GLUCOSE      POCT Blood Glucose.: 109 mg/dL (08 Mar 2019 06:48)      Type & Screen: 3/9  CXR: 3/9 to be done  EKG: 3/9 to be done       89y Male with afib and recent cessation of DOAC in setting of TURBT presents with left acute limb ischemia secondary to occlusion of common femora through proximal SFA. Still has motor function. On heparin gtt.    Pt and family now agreeable to surgery, discussed risks/benefits of procedure with all (family and pt) at bedside, pt signed consent form for left leg angiogram, thrombectomy, possible bypass, possible fasciotomies. Consent in chart.    - Continue heparin gtt at 600 u/hr - no titration of drip. CONTINUE hep gtt into OR. DO NOT HOLD.  - Keep NPO / IVF.  - Pre-op labs done. 4u pRBC on hold for OR.  - Pre-op CXR/EKG to be done.  - Rec continuous bladder irrigation - discussed with urology need for hep gtt before and during OR and that pt will likely need to get pushes of hep in OR despite gross hematuria.    Vascular Surgery  Pager [phone] PRE OPERATIVE NOTE    Pre-op Diagnosis: Left leg acute limb ischemia 2/2 occlusion of CFA thru proximal SFA  Procedure: Left leg angiogram, thrombectomy, possible bypass, possible fasciotomies  Surgeon: Martha  Time: today, likely this afternoon                        9.0    4.03  )-----------( 130      ( 09 Mar 2019 07:00 )             30.2     03-09    141  |  104  |  11  ----------------------------<  101<H>  3.5   |  25  |  0.96    Ca    8.8      09 Mar 2019 07:00  Mg     1.7     03-09    TPro  6.7  /  Alb  3.1<L>  /  TBili  0.3  /  DBili  x   /  AST  13  /  ALT  6   /  AlkPhos  93  03-08    LIVER FUNCTIONS - ( 08 Mar 2019 08:00 )  Alb: 3.1 g/dL / Pro: 6.7 g/dL / ALK PHOS: 93 u/L / ALT: 6 u/L / AST: 13 u/L / GGT: x           PT/INR - ( 08 Mar 2019 08:00 )   PT: 13.5 SEC;   INR: 1.18          PTT - ( 09 Mar 2019 07:00 )  PTT:154.8 SEC  CAPILLARY BLOOD GLUCOSE      POCT Blood Glucose.: 109 mg/dL (08 Mar 2019 06:48)      Type & Screen: 3/9  CXR: 3/9 to be done  EKG: 3/9 to be done       89y Male with afib and recent cessation of DOAC in setting of TURBT presents with left acute limb ischemia secondary to occlusion of common femora through proximal SFA. Still has motor function. On heparin gtt.    Pt and family now agreeable to surgery, discussed risks/benefits of procedure with all (family and pt) at bedside, pt signed consent form for left leg angiogram, thrombectomy, possible bypass, possible fasciotomies. Consent in chart.    - Continue heparin gtt at 600 u/hr - no titration of drip. CONTINUE hep gtt into OR. DO NOT HOLD.  - Keep NPO / IVF.  - Pre-op labs done. 4u pRBC on hold for OR.  - Pre-op CXR/EKG to be done.  - Rec continuous bladder irrigation - discussed with urology need for hep gtt before and during OR and that pt will likely need to get pushes of hep in OR despite gross hematuria.    Pt to go to OR today, likely this afternoon.    Vascular Surgery  Pager [phone]

## 2019-03-09 NOTE — PROGRESS NOTE ADULT - ASSESSMENT
89 y/o male with a PMHx of HTN, HLD, A. 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

## 2019-03-09 NOTE — PROGRESS NOTE ADULT - PROBLEM SELECTOR PLAN 1
Initial CTA H/N without intra/extracranial stenosis but showed multiple areas of possible CVA new from previous imaging  Patient was off Eliquis for ~1 week due to TURB so likely caused by underlying Afib  Neurology consultation appreciated - will order repeat CT head in 48hr as unable to get MR d/t PPM  No need for permissive HTN  Patient passed bedside dysphagia screen - f/u S/S   PT/OT  Aspirin/Statin

## 2019-03-10 LAB
ANION GAP SERPL CALC-SCNC: 17 MMO/L — HIGH (ref 7–14)
APTT BLD: 45.8 SEC — HIGH (ref 27.5–36.3)
APTT BLD: 69.1 SEC — HIGH (ref 27.5–36.3)
APTT BLD: 72.6 SEC — HIGH (ref 27.5–36.3)
BUN SERPL-MCNC: 10 MG/DL — SIGNIFICANT CHANGE UP (ref 7–23)
CALCIUM SERPL-MCNC: 8.7 MG/DL — SIGNIFICANT CHANGE UP (ref 8.4–10.5)
CHLORIDE SERPL-SCNC: 104 MMOL/L — SIGNIFICANT CHANGE UP (ref 98–107)
CO2 SERPL-SCNC: 21 MMOL/L — LOW (ref 22–31)
CREAT SERPL-MCNC: 0.99 MG/DL — SIGNIFICANT CHANGE UP (ref 0.5–1.3)
GLUCOSE SERPL-MCNC: 90 MG/DL — SIGNIFICANT CHANGE UP (ref 70–99)
HCT VFR BLD CALC: 30.1 % — LOW (ref 39–50)
HGB BLD-MCNC: 9 G/DL — LOW (ref 13–17)
MAGNESIUM SERPL-MCNC: 1.7 MG/DL — SIGNIFICANT CHANGE UP (ref 1.6–2.6)
MCHC RBC-ENTMCNC: 26.8 PG — LOW (ref 27–34)
MCHC RBC-ENTMCNC: 29.9 % — LOW (ref 32–36)
MCV RBC AUTO: 89.6 FL — SIGNIFICANT CHANGE UP (ref 80–100)
NRBC # FLD: 0 K/UL — LOW (ref 25–125)
PHOSPHATE SERPL-MCNC: 3 MG/DL — SIGNIFICANT CHANGE UP (ref 2.5–4.5)
PLATELET # BLD AUTO: 119 K/UL — LOW (ref 150–400)
PMV BLD: 11.8 FL — SIGNIFICANT CHANGE UP (ref 7–13)
POTASSIUM SERPL-MCNC: 4.8 MMOL/L — SIGNIFICANT CHANGE UP (ref 3.5–5.3)
POTASSIUM SERPL-SCNC: 4.8 MMOL/L — SIGNIFICANT CHANGE UP (ref 3.5–5.3)
RBC # BLD: 3.36 M/UL — LOW (ref 4.2–5.8)
RBC # FLD: 16.8 % — HIGH (ref 10.3–14.5)
SODIUM SERPL-SCNC: 142 MMOL/L — SIGNIFICANT CHANGE UP (ref 135–145)
WBC # BLD: 5.81 K/UL — SIGNIFICANT CHANGE UP (ref 3.8–10.5)
WBC # FLD AUTO: 5.81 K/UL — SIGNIFICANT CHANGE UP (ref 3.8–10.5)

## 2019-03-10 PROCEDURE — 99233 SBSQ HOSP IP/OBS HIGH 50: CPT

## 2019-03-10 RX ORDER — AMIODARONE HYDROCHLORIDE 400 MG/1
100 TABLET ORAL DAILY
Qty: 0 | Refills: 0 | Status: DISCONTINUED | OUTPATIENT
Start: 2019-03-10 | End: 2019-03-15

## 2019-03-10 RX ORDER — HEPARIN SODIUM 5000 [USP'U]/ML
900 INJECTION INTRAVENOUS; SUBCUTANEOUS
Qty: 25000 | Refills: 0 | Status: DISCONTINUED | OUTPATIENT
Start: 2019-03-10 | End: 2019-03-11

## 2019-03-10 RX ORDER — MAGNESIUM SULFATE 500 MG/ML
2 VIAL (ML) INJECTION ONCE
Qty: 0 | Refills: 0 | Status: COMPLETED | OUTPATIENT
Start: 2019-03-10 | End: 2019-03-10

## 2019-03-10 RX ORDER — ACETAMINOPHEN 500 MG
975 TABLET ORAL EVERY 6 HOURS
Qty: 0 | Refills: 0 | Status: DISCONTINUED | OUTPATIENT
Start: 2019-03-10 | End: 2019-03-12

## 2019-03-10 RX ADMIN — HEPARIN SODIUM 9 UNIT(S)/HR: 5000 INJECTION INTRAVENOUS; SUBCUTANEOUS at 04:23

## 2019-03-10 RX ADMIN — TAMSULOSIN HYDROCHLORIDE 0.4 MILLIGRAM(S): 0.4 CAPSULE ORAL at 21:14

## 2019-03-10 RX ADMIN — HEPARIN SODIUM 9 UNIT(S)/HR: 5000 INJECTION INTRAVENOUS; SUBCUTANEOUS at 21:19

## 2019-03-10 RX ADMIN — ATROPA BELLADONNA AND OPIUM 1 SUPPOSITORY(S): 16.2; 6 SUPPOSITORY RECTAL at 00:30

## 2019-03-10 RX ADMIN — PANTOPRAZOLE SODIUM 40 MILLIGRAM(S): 20 TABLET, DELAYED RELEASE ORAL at 06:20

## 2019-03-10 RX ADMIN — MONTELUKAST 10 MILLIGRAM(S): 4 TABLET, CHEWABLE ORAL at 11:55

## 2019-03-10 RX ADMIN — AMIODARONE HYDROCHLORIDE 100 MILLIGRAM(S): 400 TABLET ORAL at 12:02

## 2019-03-10 RX ADMIN — Medication 1 MILLIGRAM(S): at 11:56

## 2019-03-10 RX ADMIN — DONEPEZIL HYDROCHLORIDE 10 MILLIGRAM(S): 10 TABLET, FILM COATED ORAL at 21:14

## 2019-03-10 RX ADMIN — Medication 50 GRAM(S): at 11:58

## 2019-03-10 RX ADMIN — LIDOCAINE 1 APPLICATION(S): 4 CREAM TOPICAL at 23:55

## 2019-03-10 RX ADMIN — Medication 1000 MILLIGRAM(S): at 01:00

## 2019-03-10 RX ADMIN — SODIUM CHLORIDE 50 MILLILITER(S): 9 INJECTION, SOLUTION INTRAVENOUS at 21:18

## 2019-03-10 RX ADMIN — Medication 975 MILLIGRAM(S): at 14:34

## 2019-03-10 RX ADMIN — Medication 975 MILLIGRAM(S): at 14:04

## 2019-03-10 RX ADMIN — Medication 100 MILLIGRAM(S): at 21:14

## 2019-03-10 RX ADMIN — ATORVASTATIN CALCIUM 80 MILLIGRAM(S): 80 TABLET, FILM COATED ORAL at 21:14

## 2019-03-10 RX ADMIN — Medication 975 MILLIGRAM(S): at 23:55

## 2019-03-10 RX ADMIN — Medication 100 MILLIGRAM(S): at 11:57

## 2019-03-10 RX ADMIN — Medication 975 MILLIGRAM(S): at 22:34

## 2019-03-10 NOTE — PROGRESS NOTE ADULT - ASSESSMENT
89 y/o male with a PMHx of HTN, HLD, A. 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

## 2019-03-10 NOTE — CHART NOTE - NSCHARTNOTEFT_GEN_A_CORE
POST-OPERATIVE NOTE    Subjective:  Patient is s/p L femoral cutdown, thrombectomy . Recovering appropriately. Pain well controlled.     Vital Signs Last 24 Hrs  T(C): 36.4 (10 Mar 2019 06:18), Max: 37.3 (09 Mar 2019 10:05)  T(F): 97.6 (10 Mar 2019 06:18), Max: 99.2 (09 Mar 2019 10:05)  HR: 59 (10 Mar 2019 06:18) (59 - 75)  BP: 134/61 (10 Mar 2019 06:18) (134/61 - 180/86)  BP(mean): 95 (10 Mar 2019 00:00) (87 - 114)  RR: 18 (10 Mar 2019 06:18) (12 - 20)  SpO2: 100% (10 Mar 2019 06:18) (95% - 100%)  I&O's Detail    08 Mar 2019 07:01  -  09 Mar 2019 07:00  --------------------------------------------------------  IN:  Total IN: 0 mL    OUT:    Indwelling Catheter - Urethral: 1500 mL  Total OUT: 1500 mL    Total NET: -1500 mL      09 Mar 2019 06:01  -  10 Mar 2019 06:40  --------------------------------------------------------  IN:    heparin  Infusion.: 10.5 mL    heparin Infusion: 21 mL    lactated ringers.: 360 mL    Oral Fluid: 200 mL  Total IN: 591.5 mL    OUT:    Indwelling Catheter - Urethral: 6000 mL  Total OUT: 6000 mL    Total NET: -5408.5 mL        amiodarone    Tablet 100  heparin  Infusion 600  metoprolol tartrate 100  tamsulosin 0.4    PAST MEDICAL & SURGICAL HISTORY:  Memory loss  Murmur, cardiac  UTI (urinary tract infection): as of 2-20-19, patient on cipro  Hearing loss: bilateral  Snoring: MAYA precautions -- responds affirmatively to STOP BANG questionnaire  Alcohol abuse: quit in 2012 after first CVA -- had been heavy drinker  Irregular heart beat: Pacemaker--Medtronic (advisa) placed 10-16-15; model #A3SR01; serial number [id number]  Hypertension  Erosion of penile prosthesis  Bladder mass  Dementia  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  BPH (benign prostatic hypertrophy)  Hematuria  Asthma, mild intermittent: last use of inhalers greater than 6 months ago  Dyslipidemia  HTN (hypertension), benign  Atrial fibrillation: diagnosed approximately 2008,  treated medically, on coumadin  Pain: penile prosthesis removed due to erosion  Eye abnormality: post surgery had ptosis left eye (ptosis NOT from CVA)  Bladder tumor: cysto, TURBT  History of permanent cardiac pacemaker placement: Medtronic (advisa) placed 10-16-15; model # A3SR01; serial number [id number]  S/P TURP        Physical Exam:  General: NAD, resting comfortably in bed  Pulmonary: Nonlabored breathing, no respiratory distress  Cardiovascular: NSR  Abdominal: soft, NT/ND, L groin dressing CDI, no e/o of hematoma  : dorantes  LLE: Fem 1+, pop 1+, PT signal         LABS:                        9.0    5.75  )-----------( 128      ( 09 Mar 2019 19:50 )             31.4     03-10    142  |  104  |  10  ----------------------------<  90  4.8   |  21<L>  |  0.99    Ca    8.7      10 Mar 2019 03:23  Phos  3.0     03-10  Mg     1.7     03-10    TPro  6.7  /  Alb  3.1<L>  /  TBili  0.3  /  DBili  x   /  AST  13  /  ALT  6   /  AlkPhos  93  03-08    PT/INR - ( 09 Mar 2019 19:50 )   PT: 13.7 SEC;   INR: 1.20          PTT - ( 10 Mar 2019 03:23 )  PTT:45.8 SEC  CAPILLARY BLOOD GLUCOSE          Radiology and Additional Studies:    Assessment:  The patient is a 89y Male who is now several hours post-op from a s/p L femoral cutdown, thrombectomy     Plan:  - Therapeutic  Heparin PTT goal 59-99   - Pain control as needed  - DVT ppx  - OOB and ambulating as tolerated  - F/u AM labs    Team C [id number]

## 2019-03-10 NOTE — PROGRESS NOTE ADULT - PROBLEM SELECTOR PLAN 2
CTA LE showing complete occlusion of L femoral artery  s/p L femoral thrombectomy and vein patch angioplasty  cont heparin gtt as per vascular team

## 2019-03-10 NOTE — PROGRESS NOTE ADULT - SUBJECTIVE AND OBJECTIVE BOX
Vascular Surgery Progress Note:    24hr events: went to the OR yesterday for L femoral cutdown & thrombectomy. Urology changed dorantes & started CBI on pt.    Subjective:  No acute events overnight.  Patient examined at bedside.  No new c/o.    Objective:  Exam:  Gen: alert, oriented, in NAD  Neuro: motor and sensory grossly intact  Cards: regular  Resp: non-labored breathing  Groin: L groin site c/d/i  Pulses: Right - Palpable DP. Left - no DP/PT, palpable popliteal pulse  MSK: Can move left toes and dorsi/plantarflex    MEDICATIONS  (STANDING):  amiodarone    Tablet 100 milliGRAM(s) Oral daily  atorvastatin 80 milliGRAM(s) Oral at bedtime  docusate sodium 100 milliGRAM(s) Oral three times a day  donepezil 10 milliGRAM(s) Oral at bedtime  folic acid 1 milliGRAM(s) Oral daily  heparin  Infusion 900 Unit(s)/Hr (9 mL/Hr) IV Continuous <Continuous>  lactated ringers. 1000 milliLiter(s) (50 mL/Hr) IV Continuous <Continuous>  magnesium sulfate  IVPB 2 Gram(s) IV Intermittent once  metoprolol tartrate 100 milliGRAM(s) Oral two times a day  montelukast 10 milliGRAM(s) Oral daily  pantoprazole    Tablet 40 milliGRAM(s) Oral before breakfast  senna 2 Tablet(s) Oral at bedtime  tamsulosin 0.4 milliGRAM(s) Oral at bedtime    MEDICATIONS  (PRN):  acetaminophen   Tablet .. 975 milliGRAM(s) Oral every 6 hours PRN Mild Pain (1 - 3)  belladonna 16.2 mG/opium 30 mg Suppository 1 Suppository(s) Rectal every 6 hours PRN bladder spasms  lidocaine 2% Gel 1 Application(s) Topical every 4 hours PRN pain from dorantes      Vital Signs Last 24 Hrs  T(C): 37 (10 Mar 2019 10:08), Max: 37.3 (09 Mar 2019 15:14)  T(F): 98.6 (10 Mar 2019 10:08), Max: 99.2 (09 Mar 2019 15:14)  HR: 59 (10 Mar 2019 10:08) (59 - 75)  BP: 151/71 (10 Mar 2019 10:08) (134/61 - 180/86)  BP(mean): 95 (10 Mar 2019 00:00) (87 - 114)  RR: 18 (10 Mar 2019 10:08) (12 - 20)  SpO2: 100% (10 Mar 2019 10:08) (95% - 100%)    I&O's Detail    09 Mar 2019 06:01  -  10 Mar 2019 07:00  --------------------------------------------------------  IN:    heparin  Infusion.: 10.5 mL    heparin Infusion: 21 mL    lactated ringers.: 360 mL    Oral Fluid: 200 mL  Total IN: 591.5 mL    OUT:    Indwelling Catheter - Urethral: 9000 mL  Total OUT: 9000 mL    Total NET: -8408.5 mL          LABS:                        9.0    5.81  )-----------( 119      ( 10 Mar 2019 06:00 )             30.1     03-10    142  |  104  |  10  ----------------------------<  90  4.8   |  21<L>  |  0.99    Ca    8.7      10 Mar 2019 03:23  Phos  3.0     03-10  Mg     1.7     03-10      PT/INR - ( 09 Mar 2019 19:50 )   PT: 13.7 SEC;   INR: 1.20          PTT - ( 10 Mar 2019 03:23 )  PTT:45.8 SEC

## 2019-03-10 NOTE — PROGRESS NOTE ADULT - ASSESSMENT
89y Male with afib and recent cessation of DOAC in setting of TURBT presents with left acute limb ischemia secondary to occlusion of common femora through proximal SFA. Still has motor function.  CBI running clear pink    - Anticoagulation per primary team  - Will wean CBI as tolerated, currently clear pink on fast drip

## 2019-03-10 NOTE — PROGRESS NOTE ADULT - SUBJECTIVE AND OBJECTIVE BOX
Patient is a 89y old  Male who presents with a chief complaint of Possible CVA (09 Mar 2019 16:35)      Vascular Surgery Attending Progress Note    Interval HPI: pt states left leg is feeling better    Medications:  acetaminophen   Tablet .. 975 milliGRAM(s) Oral every 6 hours PRN  amiodarone    Tablet 100 milliGRAM(s) Oral daily  atorvastatin 80 milliGRAM(s) Oral at bedtime  belladonna 16.2 mG/opium 30 mg Suppository 1 Suppository(s) Rectal every 6 hours PRN  docusate sodium 100 milliGRAM(s) Oral three times a day  donepezil 10 milliGRAM(s) Oral at bedtime  folic acid 1 milliGRAM(s) Oral daily  heparin  Infusion 900 Unit(s)/Hr IV Continuous <Continuous>  lactated ringers. 1000 milliLiter(s) IV Continuous <Continuous>  lidocaine 2% Gel 1 Application(s) Topical every 4 hours PRN  magnesium sulfate  IVPB 2 Gram(s) IV Intermittent once  metoprolol tartrate 100 milliGRAM(s) Oral two times a day  montelukast 10 milliGRAM(s) Oral daily  pantoprazole    Tablet 40 milliGRAM(s) Oral before breakfast  senna 2 Tablet(s) Oral at bedtime  tamsulosin 0.4 milliGRAM(s) Oral at bedtime      Vital Signs Last 24 Hrs  T(C): 36.4 (10 Mar 2019 06:18), Max: 37.3 (09 Mar 2019 10:05)  T(F): 97.6 (10 Mar 2019 06:18), Max: 99.2 (09 Mar 2019 10:05)  HR: 59 (10 Mar 2019 06:18) (59 - 75)  BP: 134/61 (10 Mar 2019 06:18) (134/61 - 180/86)  BP(mean): 95 (10 Mar 2019 00:00) (87 - 114)  RR: 18 (10 Mar 2019 06:18) (12 - 20)  SpO2: 100% (10 Mar 2019 06:18) (95% - 100%)  I&O's Summary    09 Mar 2019 06:01  -  10 Mar 2019 07:00  --------------------------------------------------------  IN: 591.5 mL / OUT: 9000 mL / NET: -8408.5 mL        Physical Exam:  Neuro  A&Ox3 VSS  Vascular:  lle warm well perfused, dressing     LABS:                        9.0    5.81  )-----------( 119      ( 10 Mar 2019 06:00 )             30.1     03-10    142  |  104  |  10  ----------------------------<  90  4.8   |  21<L>  |  0.99    Ca    8.7      10 Mar 2019 03:23  Phos  3.0     03-10  Mg     1.7     03-10      PT/INR - ( 09 Mar 2019 19:50 )   PT: 13.7 SEC;   INR: 1.20          PTT - ( 10 Mar 2019 03:23 )  PTT:45.8 SEC    YOKO COPELAND MD  [phone]

## 2019-03-10 NOTE — PROGRESS NOTE ADULT - PROBLEM SELECTOR PLAN 1
Initial CTA H/N without intra/extracranial stenosis but showed multiple areas of possible CVA new from previous imaging  Patient was off Eliquis for ~1 week due to TURB so likely caused by underlying Afib  Neurology consultation appreciated - will need repeat CT head  as unable to get MR d/t PPM  No need for permissive HTN  Patient passed bedside dysphagia screen - f/u S/S   PT/OT  Aspirin/Statin

## 2019-03-10 NOTE — PROGRESS NOTE ADULT - SUBJECTIVE AND OBJECTIVE BOX
Patient is a 89y old  Male who presents with a chief complaint of Possible CVA (10 Mar 2019 14:03)      SUBJECTIVE / OVERNIGHT EVENTS:    pt is s/p L femoral thrombectomy and vein patch angioplasty. also started on CBI. No new complaint     Review of Systems:    RESPIRATORY: No cough, wheezing, chills or hemoptysis; No shortness of breath  CARDIOVASCULAR: No chest pain, palpitations, dizziness, or leg swelling  GASTROINTESTINAL: No abdominal or epigastric pain. No nausea, vomiting, or hematemesis; No diarrhea or constipation. No melena or hematochezia.    MEDICATIONS  (STANDING):  amiodarone    Tablet 100 milliGRAM(s) Oral daily  atorvastatin 80 milliGRAM(s) Oral at bedtime  docusate sodium 100 milliGRAM(s) Oral three times a day  donepezil 10 milliGRAM(s) Oral at bedtime  folic acid 1 milliGRAM(s) Oral daily  heparin  Infusion 900 Unit(s)/Hr (9 mL/Hr) IV Continuous <Continuous>  lactated ringers. 1000 milliLiter(s) (50 mL/Hr) IV Continuous <Continuous>  metoprolol tartrate 100 milliGRAM(s) Oral two times a day  montelukast 10 milliGRAM(s) Oral daily  pantoprazole    Tablet 40 milliGRAM(s) Oral before breakfast  senna 2 Tablet(s) Oral at bedtime  tamsulosin 0.4 milliGRAM(s) Oral at bedtime    MEDICATIONS  (PRN):  acetaminophen   Tablet .. 975 milliGRAM(s) Oral every 6 hours PRN Mild Pain (1 - 3)  belladonna 16.2 mG/opium 30 mg Suppository 1 Suppository(s) Rectal every 6 hours PRN bladder spasms  lidocaine 2% Gel 1 Application(s) Topical every 4 hours PRN pain from dorantes      PHYSICAL EXAM:  T(C): 36.7 (03-10-19 @ 14:21), Max: 37 (03-09-19 @ 18:35)  HR: 72 (03-10-19 @ 14:21) (59 - 75)  BP: 141/72 (03-10-19 @ 14:21) (134/61 - 180/86)  RR: 16 (03-10-19 @ 14:21) (12 - 20)  SpO2: 100% (03-10-19 @ 14:21) (95% - 100%)  I&O's Summary    09 Mar 2019 06:01  -  10 Mar 2019 07:00  --------------------------------------------------------  IN: 591.5 mL / OUT: 9000 mL / NET: -8408.5 mL      GENERAL: elderly male  lying in bed in NAD   MENTAL STATUS/PSYCH:  AAO x2-3   HEAD:  Atraumatic, Normocephalic  EYES: L eye ptosis EOMI, PERRLA, conjunctiva and sclera clear  NECK: Supple, No elevated JVD  CHEST/LUNG: Clear to auscultation bilaterally; No wheeze  HEART: Regular rate and rhythm; No murmurs, rubs, or gallops  ABDOMEN: Soft, Nontender, Nondistended; Bowel sounds present. CBI light pink urine   EXTREMITIES: L groin site c/d/o  unable to palpate L DP pulse  NEUROLOGY: CN II-XII grossly intact, moving all extremities  SKIN: No rashes or lesions    LABS:  CAPILLARY BLOOD GLUCOSE                              9.0    5.81  )-----------( 119      ( 10 Mar 2019 06:00 )             30.1     03-10    142  |  104  |  10  ----------------------------<  90  4.8   |  21<L>  |  0.99    Ca    8.7      10 Mar 2019 03:23  Phos  3.0     03-10  Mg     1.7     03-10      PT/INR - ( 09 Mar 2019 19:50 )   PT: 13.7 SEC;   INR: 1.20          PTT - ( 10 Mar 2019 11:28 )  PTT:72.6 SEC          RADIOLOGY & ADDITIONAL TESTS:    Imaging Personally Reviewed:    Consultant(s) Notes Reviewed:      Care Discussed with Consultants/Other Providers:

## 2019-03-10 NOTE — PROGRESS NOTE ADULT - ASSESSMENT
89y Male with hx of HTN, HLD, A. 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    - Continue hep gtt, goal ptt 58-99. Check PTT q6h until therapeutic x3.  - Regular diet.  - Urology c/s, appreciate recs. Continue CBI.  - Will hold off on long term anticoagulation until CBI urine clear.  - Appreciate medicine recs.  - Appreciate speech & swallow recs.    - PT c/s, appreciate recs.  OT recs: rehab    Pager [phone]

## 2019-03-10 NOTE — PROGRESS NOTE ADULT - SUBJECTIVE AND OBJECTIVE BOX
Subjective    s/p L femoral thrombectomy and vein patch angioplasty.  CBI running clear pink on fast drip.    Objective    Vital signs  T(F): , Max: 99.2 (03-09-19 @ 15:14)  HR: 59 (03-10-19 @ 10:08)  BP: 151/71 (03-10-19 @ 10:08)  SpO2: 100% (03-10-19 @ 10:08)  Wt(kg): --    Output     03-09 @ 06:01  -  03-10 @ 07:00  --------------------------------------------------------  IN: 591.5 mL / OUT: 9000 mL / NET: -8408.5 mL    Physical exam  Gen NAD  Abd soft, NT, ND   Warner secured, draining clear pink urine on fast drip CBI    Labs      03-10 @ 06:00    WBC 5.81  / Hct 30.1  / SCr --       03-10 @ 03:23    WBC --    / Hct --    / SCr 0.99     Urine Cx: Culture - Urine (02.20.19 @ 15:54)    Culture - Urine:   Culture grew 3 or more types of organisms which indicate  collection contamination; consider recollection only if  clinically indicated.    Specimen Source: URINE MIDSTREAM

## 2019-03-10 NOTE — PROGRESS NOTE ADULT - ASSESSMENT
89y Male with afib and recent cessation of DOAC in setting of TURBT presents with left acute limb ischemia secondary to occlusion of common femora through proximal SFA. Still has motor function. On heparin gtt.    - Continue heparin gtt at 600 u/hr - no titration of drip. CONTINUE hep gtt into OR. DO NOT HOLD.  - continue CBI  will hold on long term anticoag till cbi clear  pt/ot

## 2019-03-11 LAB
ANION GAP SERPL CALC-SCNC: 14 MMO/L — SIGNIFICANT CHANGE UP (ref 7–14)
APTT BLD: 62.2 SEC — HIGH (ref 27.5–36.3)
BUN SERPL-MCNC: 14 MG/DL — SIGNIFICANT CHANGE UP (ref 7–23)
CALCIUM SERPL-MCNC: 8.5 MG/DL — SIGNIFICANT CHANGE UP (ref 8.4–10.5)
CHLORIDE SERPL-SCNC: 102 MMOL/L — SIGNIFICANT CHANGE UP (ref 98–107)
CO2 SERPL-SCNC: 25 MMOL/L — SIGNIFICANT CHANGE UP (ref 22–31)
CREAT SERPL-MCNC: 1.18 MG/DL — SIGNIFICANT CHANGE UP (ref 0.5–1.3)
GLUCOSE SERPL-MCNC: 104 MG/DL — HIGH (ref 70–99)
HCT VFR BLD CALC: 24.8 % — LOW (ref 39–50)
HGB BLD-MCNC: 7.4 G/DL — LOW (ref 13–17)
INR BLD: 1.24 — HIGH (ref 0.88–1.17)
MAGNESIUM SERPL-MCNC: 2 MG/DL — SIGNIFICANT CHANGE UP (ref 1.6–2.6)
MCHC RBC-ENTMCNC: 26.1 PG — LOW (ref 27–34)
MCHC RBC-ENTMCNC: 29.8 % — LOW (ref 32–36)
MCV RBC AUTO: 87.3 FL — SIGNIFICANT CHANGE UP (ref 80–100)
NRBC # FLD: 0 K/UL — LOW (ref 25–125)
PHOSPHATE SERPL-MCNC: 2.8 MG/DL — SIGNIFICANT CHANGE UP (ref 2.5–4.5)
PLATELET # BLD AUTO: 120 K/UL — LOW (ref 150–400)
PMV BLD: 12 FL — SIGNIFICANT CHANGE UP (ref 7–13)
POTASSIUM SERPL-MCNC: 3.6 MMOL/L — SIGNIFICANT CHANGE UP (ref 3.5–5.3)
POTASSIUM SERPL-SCNC: 3.6 MMOL/L — SIGNIFICANT CHANGE UP (ref 3.5–5.3)
PROTHROM AB SERPL-ACNC: 14.2 SEC — HIGH (ref 9.8–13.1)
RBC # BLD: 2.84 M/UL — LOW (ref 4.2–5.8)
RBC # FLD: 17 % — HIGH (ref 10.3–14.5)
SODIUM SERPL-SCNC: 141 MMOL/L — SIGNIFICANT CHANGE UP (ref 135–145)
WBC # BLD: 7.57 K/UL — SIGNIFICANT CHANGE UP (ref 3.8–10.5)
WBC # FLD AUTO: 7.57 K/UL — SIGNIFICANT CHANGE UP (ref 3.8–10.5)

## 2019-03-11 PROCEDURE — 99233 SBSQ HOSP IP/OBS HIGH 50: CPT

## 2019-03-11 PROCEDURE — 70450 CT HEAD/BRAIN W/O DYE: CPT | Mod: 26

## 2019-03-11 RX ORDER — APIXABAN 2.5 MG/1
5 TABLET, FILM COATED ORAL EVERY 12 HOURS
Qty: 0 | Refills: 0 | Status: DISCONTINUED | OUTPATIENT
Start: 2019-03-11 | End: 2019-03-15

## 2019-03-11 RX ORDER — APIXABAN 2.5 MG/1
5 TABLET, FILM COATED ORAL EVERY 12 HOURS
Qty: 0 | Refills: 0 | Status: DISCONTINUED | OUTPATIENT
Start: 2019-03-11 | End: 2019-03-11

## 2019-03-11 RX ADMIN — SODIUM CHLORIDE 50 MILLILITER(S): 9 INJECTION, SOLUTION INTRAVENOUS at 21:18

## 2019-03-11 RX ADMIN — Medication 100 MILLIGRAM(S): at 10:16

## 2019-03-11 RX ADMIN — PANTOPRAZOLE SODIUM 40 MILLIGRAM(S): 20 TABLET, DELAYED RELEASE ORAL at 06:46

## 2019-03-11 RX ADMIN — MONTELUKAST 10 MILLIGRAM(S): 4 TABLET, CHEWABLE ORAL at 11:58

## 2019-03-11 RX ADMIN — SENNA PLUS 2 TABLET(S): 8.6 TABLET ORAL at 21:19

## 2019-03-11 RX ADMIN — ATROPA BELLADONNA AND OPIUM 1 SUPPOSITORY(S): 16.2; 6 SUPPOSITORY RECTAL at 13:51

## 2019-03-11 RX ADMIN — ATROPA BELLADONNA AND OPIUM 1 SUPPOSITORY(S): 16.2; 6 SUPPOSITORY RECTAL at 14:21

## 2019-03-11 RX ADMIN — AMIODARONE HYDROCHLORIDE 100 MILLIGRAM(S): 400 TABLET ORAL at 05:07

## 2019-03-11 RX ADMIN — Medication 100 MILLIGRAM(S): at 21:19

## 2019-03-11 RX ADMIN — APIXABAN 5 MILLIGRAM(S): 2.5 TABLET, FILM COATED ORAL at 18:54

## 2019-03-11 RX ADMIN — ATORVASTATIN CALCIUM 80 MILLIGRAM(S): 80 TABLET, FILM COATED ORAL at 21:19

## 2019-03-11 RX ADMIN — Medication 1 MILLIGRAM(S): at 11:57

## 2019-03-11 RX ADMIN — DONEPEZIL HYDROCHLORIDE 10 MILLIGRAM(S): 10 TABLET, FILM COATED ORAL at 21:19

## 2019-03-11 RX ADMIN — Medication 100 MILLIGRAM(S): at 05:07

## 2019-03-11 RX ADMIN — Medication 100 MILLIGRAM(S): at 13:51

## 2019-03-11 RX ADMIN — TAMSULOSIN HYDROCHLORIDE 0.4 MILLIGRAM(S): 0.4 CAPSULE ORAL at 21:19

## 2019-03-11 RX ADMIN — Medication 975 MILLIGRAM(S): at 10:47

## 2019-03-11 RX ADMIN — Medication 975 MILLIGRAM(S): at 10:17

## 2019-03-11 NOTE — PROGRESS NOTE ADULT - PROBLEM SELECTOR PLAN 1
Initial CTA H/N without intra/extracranial stenosis but showed multiple areas of possible CVA new from previous imaging  Patient was off Eliquis for ~1 week due to TURB so likely caused by underlying Afib  Neurology consultation appreciated - will need repeat CT head  as unable to get MR d/t PPM  No need for permissive HTN  Patient passed bedside dysphagia screen - f/u S/S   PT/OT  Aspirin/Statin CTA LE showing complete occlusion of L femoral artery  s/p L femoral thrombectomy and vein patch angioplasty  cont heparin gtt as per vascular team

## 2019-03-11 NOTE — PROGRESS NOTE ADULT - PROBLEM SELECTOR PLAN 6
Due to recent TURB- path + invasive urothelial carcinoma   cont CBI as per urology Continue statin   FLP

## 2019-03-11 NOTE — PROGRESS NOTE ADULT - ASSESSMENT
89y Male with afib and recent cessation of DOAC in setting of TURBT presents with left acute limb ischemia secondary to occlusion of common femora through proximal SFA. Still has motor function now s/p left femoral cutdown and thrombectomy on 3/9.       - manually irrigated this AM w/return of minimal clot  - c/w CBI, manually irrigate PRN  - Will wean CBI as tolerated, currently clear pink on mod/fast drip  - Anticoagulation per primary team 89y Male with afib and recent cessation of DOAC in setting of TURBT presents with left acute limb ischemia secondary to occlusion of common femora through proximal SFA. Still has motor function now s/p left femoral cutdown and thrombectomy on 3/9.       - manually irrigated this AM w/return of minimal clot  - c/w CBI, manually irrigate PRN  - Will wean CBI as tolerated, currently clear pink on mod/fast drip  - c/w B&O suppositories, lidocaine jelly to tip of penis PRN  - Anticoagulation per primary team

## 2019-03-11 NOTE — PROGRESS NOTE ADULT - SUBJECTIVE AND OBJECTIVE BOX
JUNAID NESBITT  [id number]    Subjective:    Patient seen and examined, no complaints.   Remains on CBI with pink draining urine. VSS.        Objective:  T(C): 37.2 (03-11-19 @ 10:10), Max: 37.8 (03-10-19 @ 17:40)  HR: 69 (03-11-19 @ 10:10) (60 - 72)  BP: 124/57 (03-11-19 @ 10:10) (106/58 - 153/80)  RR: 16 (03-11-19 @ 10:10) (16 - 20)  SpO2: 100% (03-11-19 @ 10:10) (99% - 100%)  Wt(kg): --   03-11    141  |  102  |  14  ----------------------------<  104<H>  3.6   |  25  |  1.18    Ca    8.5      11 Mar 2019 06:18  Phos  2.8     03-11  Mg     2.0     03-11                          7.4    7.57  )-----------( 120      ( 11 Mar 2019 06:18 )             24.8       03-10 @ 07:01  -  03-11 @ 07:00  --------------------------------------------------------  IN: 20795 mL / OUT: 86070 mL / NET: 1749 mL      PHYSICAL EXAM:    Objective:  Exam:  Gen: alert, oriented, in NAD  Neuro: motor and sensory grossly intact  Cards: regular  Resp: non-labored breathing  Groin: L groin site c/d/i  Pulses: Right - Palpable DP. Left - no DP/PT, palpable popliteal pulse  MSK: Can move left toes and dorsi/plantarflex  Neuro: Pinpoint pupils BL, L sided ptosis, chronic.             MEDICATIONS  (STANDING):  amiodarone    Tablet 100 milliGRAM(s) Oral daily  apixaban 5 milliGRAM(s) Oral every 12 hours  atorvastatin 80 milliGRAM(s) Oral at bedtime  docusate sodium 100 milliGRAM(s) Oral three times a day  donepezil 10 milliGRAM(s) Oral at bedtime  folic acid 1 milliGRAM(s) Oral daily  lactated ringers. 1000 milliLiter(s) (50 mL/Hr) IV Continuous <Continuous>  metoprolol tartrate 100 milliGRAM(s) Oral two times a day  montelukast 10 milliGRAM(s) Oral daily  pantoprazole    Tablet 40 milliGRAM(s) Oral before breakfast  senna 2 Tablet(s) Oral at bedtime  tamsulosin 0.4 milliGRAM(s) Oral at bedtime    MEDICATIONS  (PRN):  acetaminophen   Tablet .. 975 milliGRAM(s) Oral every 6 hours PRN Mild Pain (1 - 3)  belladonna 16.2 mG/opium 30 mg Suppository 1 Suppository(s) Rectal every 6 hours PRN bladder spasms  lidocaine 2% Gel 1 Application(s) Topical every 4 hours PRN pain from dorantes

## 2019-03-11 NOTE — PROVIDER CONTACT NOTE (OTHER) - SITUATION
MD Villalobos was called because RN noticed that patient left pupil was pinpoint and fixed. Right eye 1mm. RN wanted to clarify if this is the patient norm

## 2019-03-11 NOTE — PROGRESS NOTE ADULT - PROBLEM SELECTOR PLAN 2
CTA LE showing complete occlusion of L femoral artery  s/p L femoral thrombectomy and vein patch angioplasty  cont heparin gtt as per vascular team Due to recent TURB- path + invasive urothelial carcinoma    CBI as per urology  consider 1 u prbc given hematuria 9/30-->7.4/24.8 keep H/H >7/21

## 2019-03-11 NOTE — PROGRESS NOTE ADULT - SUBJECTIVE AND OBJECTIVE BOX
Patient is a 89y old  Male who presents with a chief complaint of Possible CVA (11 Mar 2019 09:06)      SUBJECTIVE / OVERNIGHT EVENTS:    MEDICATIONS  (STANDING):  amiodarone    Tablet 100 milliGRAM(s) Oral daily  atorvastatin 80 milliGRAM(s) Oral at bedtime  docusate sodium 100 milliGRAM(s) Oral three times a day  donepezil 10 milliGRAM(s) Oral at bedtime  folic acid 1 milliGRAM(s) Oral daily  heparin  Infusion 900 Unit(s)/Hr (9 mL/Hr) IV Continuous <Continuous>  lactated ringers. 1000 milliLiter(s) (50 mL/Hr) IV Continuous <Continuous>  metoprolol tartrate 100 milliGRAM(s) Oral two times a day  montelukast 10 milliGRAM(s) Oral daily  pantoprazole    Tablet 40 milliGRAM(s) Oral before breakfast  senna 2 Tablet(s) Oral at bedtime  tamsulosin 0.4 milliGRAM(s) Oral at bedtime    MEDICATIONS  (PRN):  acetaminophen   Tablet .. 975 milliGRAM(s) Oral every 6 hours PRN Mild Pain (1 - 3)  belladonna 16.2 mG/opium 30 mg Suppository 1 Suppository(s) Rectal every 6 hours PRN bladder spasms  lidocaine 2% Gel 1 Application(s) Topical every 4 hours PRN pain from dorantes        CAPILLARY BLOOD GLUCOSE        I&O's Summary    10 Mar 2019 07:01  -  11 Mar 2019 07:00  --------------------------------------------------------  IN: 04313 mL / OUT: 49609 mL / NET: 1749 mL        T(C): 36.6 (03-11-19 @ 05:03), Max: 37.8 (03-10-19 @ 17:40)  HR: 62 (03-11-19 @ 05:03) (59 - 72)  BP: 125/66 (03-11-19 @ 05:03) (106/58 - 153/80)  RR: 18 (03-11-19 @ 05:03) (16 - 20)  SpO2: 100% (03-11-19 @ 05:03) (99% - 100%)    PHYSICAL EXAM:  GENERAL: NAD, well-developed  HEAD:  Atraumatic, Normocephalic  EYES: EOMI, PERRLA, conjunctiva and sclera clear  NECK: Supple, No JVD  CHEST/LUNG: Clear to auscultation bilaterally; No wheeze  HEART: Regular rate and rhythm; No murmurs, rubs, or gallops  ABDOMEN: Soft, Nontender, Nondistended; Bowel sounds present  EXTREMITIES:  2+ Peripheral Pulses, No clubbing, cyanosis, or edema  PSYCH: AAOx3  NEUROLOGY: non-focal  SKIN: No rashes or lesions    LABS:                        7.4    7.57  )-----------( 120      ( 11 Mar 2019 06:18 )             24.8     03-11    141  |  102  |  14  ----------------------------<  104<H>  3.6   |  25  |  1.18    Ca    8.5      11 Mar 2019 06:18  Phos  2.8     03-11  Mg     2.0     03-11      PT/INR - ( 11 Mar 2019 06:18 )   PT: 14.2 SEC;   INR: 1.24          PTT - ( 11 Mar 2019 06:18 )  PTT:62.2 SEC            RADIOLOGY & ADDITIONAL TESTS:    Imaging Personally Reviewed:    Consultant(s) Notes Reviewed:      Care Discussed with Consultants/Other Providers: Patient is a 89y old  Male who presents with a chief complaint of Possible CVA (11 Mar 2019 09:06)      SUBJECTIVE / OVERNIGHT EVENTS: Pt in NAD no acute compliants this AM     MEDICATIONS  (STANDING):  amiodarone    Tablet 100 milliGRAM(s) Oral daily  atorvastatin 80 milliGRAM(s) Oral at bedtime  docusate sodium 100 milliGRAM(s) Oral three times a day  donepezil 10 milliGRAM(s) Oral at bedtime  folic acid 1 milliGRAM(s) Oral daily  heparin  Infusion 900 Unit(s)/Hr (9 mL/Hr) IV Continuous <Continuous>  lactated ringers. 1000 milliLiter(s) (50 mL/Hr) IV Continuous <Continuous>  metoprolol tartrate 100 milliGRAM(s) Oral two times a day  montelukast 10 milliGRAM(s) Oral daily  pantoprazole    Tablet 40 milliGRAM(s) Oral before breakfast  senna 2 Tablet(s) Oral at bedtime  tamsulosin 0.4 milliGRAM(s) Oral at bedtime    MEDICATIONS  (PRN):  acetaminophen   Tablet .. 975 milliGRAM(s) Oral every 6 hours PRN Mild Pain (1 - 3)  belladonna 16.2 mG/opium 30 mg Suppository 1 Suppository(s) Rectal every 6 hours PRN bladder spasms  lidocaine 2% Gel 1 Application(s) Topical every 4 hours PRN pain from dorantes        CAPILLARY BLOOD GLUCOSE        I&O's Summary    10 Mar 2019 07:01  -  11 Mar 2019 07:00  --------------------------------------------------------  IN: 11453 mL / OUT: 64044 mL / NET: 1749 mL        T(C): 36.6 (03-11-19 @ 05:03), Max: 37.8 (03-10-19 @ 17:40)  HR: 62 (03-11-19 @ 05:03) (59 - 72)  BP: 125/66 (03-11-19 @ 05:03) (106/58 - 153/80)  RR: 18 (03-11-19 @ 05:03) (16 - 20)  SpO2: 100% (03-11-19 @ 05:03) (99% - 100%)    PHYSICAL EXAM:  GENERAL: elderly male  lying in bed in NAD   MENTAL STATUS/PSYCH:  AAO x2-3   HEAD:  Atraumatic, Normocephalic  EYES: L eye ptosis EOMI, PERRLA, conjunctiva and sclera clear  NECK: Supple, No elevated JVD  CHEST/LUNG: Clear to auscultation bilaterally; No wheeze  HEART: Regular rate and rhythm; No murmurs, rubs, or gallops  ABDOMEN: Soft, Nontender, Nondistended; Bowel sounds present. CBI light pink urine   EXTREMITIES: L groin site c/d/o  unable to palpate L DP pulse  SKIN: No rashes or lesions    LABS:                        7.4    7.57  )-----------( 120      ( 11 Mar 2019 06:18 )             24.8     03-11    141  |  102  |  14  ----------------------------<  104<H>  3.6   |  25  |  1.18    Ca    8.5      11 Mar 2019 06:18  Phos  2.8     03-11  Mg     2.0     03-11      PT/INR - ( 11 Mar 2019 06:18 )   PT: 14.2 SEC;   INR: 1.24          PTT - ( 11 Mar 2019 06:18 )  PTT:62.2 SEC            RADIOLOGY & ADDITIONAL TESTS:    Imaging Personally Reviewed:    Consultant(s) Notes Reviewed:      Care Discussed with Consultants/Other Providers:

## 2019-03-11 NOTE — PROGRESS NOTE ADULT - SUBJECTIVE AND OBJECTIVE BOX
Subjective  No overnight events. Pt reports dorantes discomfort and some bladder spasms.    Objective    Vital signs  T(F): , Max: 100 (03-10-19 @ 17:40)  HR: 69 (03-11-19 @ 10:10)  BP: 124/57 (03-11-19 @ 10:10)  SpO2: 100% (03-11-19 @ 10:10)  Wt(kg): --    Output     03-10 @ 07:01  -  03-11 @ 07:00  --------------------------------------------------------  IN: 37951 mL / OUT: 21311 mL / NET: 1749 mL        Gen: NAD  Abd: soft, NT, ND  : dorantes output clear pink    Labs      03-11 @ 06:18    WBC 7.57  / Hct 24.8  / SCr 1.18     03-10 @ 06:00    WBC 5.81  / Hct 30.1  / SCr --         Imaging

## 2019-03-11 NOTE — CHART NOTE - NSCHARTNOTEFT_GEN_A_CORE
1 unit of pRBCs order for patients drop in Hgb today. Patient currently asymptomatic, denies dizziness, denies CP, denies SOB.   Patient refusing blood transfusion at this time due to "tired of being stuck" and would like to be given blood tomorrow.   Patient understands risks/benefits of refusing transfusion, will re-discuss in AM.

## 2019-03-11 NOTE — PROGRESS NOTE ADULT - PROBLEM SELECTOR PLAN 3
cont metoprolol and amiodarone  cont heparin gtt for AC Initial CTA H/N without intra/extracranial stenosis without any acute infarct   Patient was off Eliquis for ~1 week due to TURB so likely caused by underlying Afib  No need for permissive HTN  Patient passed bedside dysphagia screen - f/u S/S   PT/OT  Aspirin/Statin  Neurology consultation appreciated -  repeat CT head

## 2019-03-11 NOTE — PROGRESS NOTE ADULT - SUBJECTIVE AND OBJECTIVE BOX
ANESTHESIA POSTOP CHECK    89y Male POSTOP DAY 1 S/P left leg thrombectomy    Vital Signs Last 24 Hrs  T(C): 36.6 (11 Mar 2019 05:03), Max: 37.8 (10 Mar 2019 17:40)  T(F): 97.8 (11 Mar 2019 05:03), Max: 100 (10 Mar 2019 17:40)  HR: 62 (11 Mar 2019 05:03) (59 - 72)  BP: 125/66 (11 Mar 2019 05:03) (106/58 - 153/80)  BP(mean): --  RR: 18 (11 Mar 2019 05:03) (16 - 20)  SpO2: 100% (11 Mar 2019 05:03) (99% - 100%)  I&O's Summary    10 Mar 2019 07:01  -  11 Mar 2019 07:00  --------------------------------------------------------  IN: 16098 mL / OUT: 92607 mL / NET: 1749 mL        [x ] NO APPARENT ANESTHESIA COMPLICATIONS      Comments:

## 2019-03-11 NOTE — PROGRESS NOTE ADULT - ASSESSMENT
89 y/o male with a PMHx of HTN, HLD, A. 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

## 2019-03-11 NOTE — PROGRESS NOTE ADULT - PROBLEM SELECTOR PLAN 4
Continue Metoprolol, donepezil   Monitor BP and adjust meds as needed   DASH diet cont metoprolol and amiodarone  cont heparin gtt for AC

## 2019-03-11 NOTE — PROGRESS NOTE ADULT - ASSESSMENT
89y Male with hx of HTN, HLD, A. 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    - DC Heparin, restart Eliquis   - Continue regular diet.  - Continue CBI per Uro, appreciate recs   - Appreciate medicine recs.  - Appreciate speech & swallow recs.  - Repeat CT head today     - PT c/s, appreciate recs.  OT recs: rehab    Pager [phone]

## 2019-03-12 PROCEDURE — 99232 SBSQ HOSP IP/OBS MODERATE 35: CPT

## 2019-03-12 RX ORDER — ACETAMINOPHEN 500 MG
650 TABLET ORAL EVERY 6 HOURS
Qty: 0 | Refills: 0 | Status: DISCONTINUED | OUTPATIENT
Start: 2019-03-12 | End: 2019-03-15

## 2019-03-12 RX ORDER — ATROPA BELLADONNA AND OPIUM 16.2; 6 MG/1; MG/1
1 SUPPOSITORY RECTAL EVERY 6 HOURS
Qty: 0 | Refills: 0 | Status: DISCONTINUED | OUTPATIENT
Start: 2019-03-12 | End: 2019-03-14

## 2019-03-12 RX ORDER — LIDOCAINE 4 G/100G
1 CREAM TOPICAL EVERY 4 HOURS
Qty: 0 | Refills: 0 | Status: DISCONTINUED | OUTPATIENT
Start: 2019-03-12 | End: 2019-03-15

## 2019-03-12 RX ORDER — ATROPA BELLADONNA AND OPIUM 16.2; 6 MG/1; MG/1
1 SUPPOSITORY RECTAL EVERY 6 HOURS
Qty: 0 | Refills: 0 | Status: DISCONTINUED | OUTPATIENT
Start: 2019-03-12 | End: 2019-03-12

## 2019-03-12 RX ADMIN — MONTELUKAST 10 MILLIGRAM(S): 4 TABLET, CHEWABLE ORAL at 11:10

## 2019-03-12 RX ADMIN — Medication 100 MILLIGRAM(S): at 06:23

## 2019-03-12 RX ADMIN — ATROPA BELLADONNA AND OPIUM 1 SUPPOSITORY(S): 16.2; 6 SUPPOSITORY RECTAL at 23:52

## 2019-03-12 RX ADMIN — ATORVASTATIN CALCIUM 80 MILLIGRAM(S): 80 TABLET, FILM COATED ORAL at 22:30

## 2019-03-12 RX ADMIN — ATROPA BELLADONNA AND OPIUM 1 SUPPOSITORY(S): 16.2; 6 SUPPOSITORY RECTAL at 03:42

## 2019-03-12 RX ADMIN — LIDOCAINE 1 APPLICATION(S): 4 CREAM TOPICAL at 22:30

## 2019-03-12 RX ADMIN — Medication 650 MILLIGRAM(S): at 18:32

## 2019-03-12 RX ADMIN — Medication 100 MILLIGRAM(S): at 22:31

## 2019-03-12 RX ADMIN — SENNA PLUS 2 TABLET(S): 8.6 TABLET ORAL at 22:29

## 2019-03-12 RX ADMIN — ATROPA BELLADONNA AND OPIUM 1 SUPPOSITORY(S): 16.2; 6 SUPPOSITORY RECTAL at 03:12

## 2019-03-12 RX ADMIN — Medication 650 MILLIGRAM(S): at 23:52

## 2019-03-12 RX ADMIN — Medication 100 MILLIGRAM(S): at 13:26

## 2019-03-12 RX ADMIN — ATROPA BELLADONNA AND OPIUM 1 SUPPOSITORY(S): 16.2; 6 SUPPOSITORY RECTAL at 22:42

## 2019-03-12 RX ADMIN — SODIUM CHLORIDE 50 MILLILITER(S): 9 INJECTION, SOLUTION INTRAVENOUS at 22:33

## 2019-03-12 RX ADMIN — ATROPA BELLADONNA AND OPIUM 1 SUPPOSITORY(S): 16.2; 6 SUPPOSITORY RECTAL at 16:16

## 2019-03-12 RX ADMIN — TAMSULOSIN HYDROCHLORIDE 0.4 MILLIGRAM(S): 0.4 CAPSULE ORAL at 22:30

## 2019-03-12 RX ADMIN — Medication 100 MILLIGRAM(S): at 11:10

## 2019-03-12 RX ADMIN — APIXABAN 5 MILLIGRAM(S): 2.5 TABLET, FILM COATED ORAL at 17:36

## 2019-03-12 RX ADMIN — ATROPA BELLADONNA AND OPIUM 1 SUPPOSITORY(S): 16.2; 6 SUPPOSITORY RECTAL at 14:27

## 2019-03-12 RX ADMIN — LIDOCAINE 1 APPLICATION(S): 4 CREAM TOPICAL at 18:00

## 2019-03-12 RX ADMIN — DONEPEZIL HYDROCHLORIDE 10 MILLIGRAM(S): 10 TABLET, FILM COATED ORAL at 22:30

## 2019-03-12 RX ADMIN — AMIODARONE HYDROCHLORIDE 100 MILLIGRAM(S): 400 TABLET ORAL at 06:23

## 2019-03-12 RX ADMIN — APIXABAN 5 MILLIGRAM(S): 2.5 TABLET, FILM COATED ORAL at 06:24

## 2019-03-12 RX ADMIN — PANTOPRAZOLE SODIUM 40 MILLIGRAM(S): 20 TABLET, DELAYED RELEASE ORAL at 06:24

## 2019-03-12 RX ADMIN — Medication 1 MILLIGRAM(S): at 11:10

## 2019-03-12 RX ADMIN — Medication 100 MILLIGRAM(S): at 22:30

## 2019-03-12 RX ADMIN — Medication 650 MILLIGRAM(S): at 17:36

## 2019-03-12 NOTE — PROGRESS NOTE ADULT - SUBJECTIVE AND OBJECTIVE BOX
Patient is a 89y old  Male who presents with a chief complaint of Possible CVA (12 Mar 2019 07:42)      SUBJECTIVE / OVERNIGHT EVENTS: refused prbc last night to be given today     MEDICATIONS  (STANDING):  amiodarone    Tablet 100 milliGRAM(s) Oral daily  apixaban 5 milliGRAM(s) Oral every 12 hours  atorvastatin 80 milliGRAM(s) Oral at bedtime  docusate sodium 100 milliGRAM(s) Oral three times a day  donepezil 10 milliGRAM(s) Oral at bedtime  folic acid 1 milliGRAM(s) Oral daily  lactated ringers. 1000 milliLiter(s) (50 mL/Hr) IV Continuous <Continuous>  metoprolol tartrate 100 milliGRAM(s) Oral two times a day  montelukast 10 milliGRAM(s) Oral daily  pantoprazole    Tablet 40 milliGRAM(s) Oral before breakfast  senna 2 Tablet(s) Oral at bedtime  tamsulosin 0.4 milliGRAM(s) Oral at bedtime    MEDICATIONS  (PRN):  acetaminophen   Tablet .. 975 milliGRAM(s) Oral every 6 hours PRN Mild Pain (1 - 3)  belladonna 16.2 mG/opium 30 mg Suppository 1 Suppository(s) Rectal every 6 hours PRN bladder spasms  lidocaine 2% Gel 1 Application(s) Topical every 4 hours PRN pain from dorantes        CAPILLARY BLOOD GLUCOSE        I&O's Summary    11 Mar 2019 07:01  -  12 Mar 2019 07:00  --------------------------------------------------------  IN: 600 mL / OUT: 0 mL / NET: 600 mL        T(C): 36.5 (03-12-19 @ 06:17), Max: 37.2 (03-11-19 @ 10:10)  HR: 61 (03-12-19 @ 06:17) (61 - 79)  BP: 133/57 (03-12-19 @ 06:17) (110/58 - 151/58)  RR: 18 (03-12-19 @ 06:17) (16 - 18)  SpO2: 100% (03-12-19 @ 06:17) (96% - 100%)    PHYSICAL EXAM:  GENERAL: elderly male  lying in bed in NAD   HEAD:  Atraumatic, Normocephalic  EYES: L eye ptosis EOMI, PERRLA, conjunctiva and sclera clear  NECK: Supple, No elevated JVD  CHEST/LUNG: Clear to auscultation bilaterally; No wheeze  HEART: Regular rate and rhythm; No murmurs, rubs, or gallops  ABDOMEN: Soft, Nontender, Nondistended; Bowel sounds present. CBI light pink urine   EXTREMITIES: L groin site c/d/o   MENTAL STATUS/PSYCH:  AAO x2-3   SKIN: No rashes or lesions    LABS:                        7.4    7.57  )-----------( 120      ( 11 Mar 2019 06:18 )             24.8     03-11    141  |  102  |  14  ----------------------------<  104<H>  3.6   |  25  |  1.18    Ca    8.5      11 Mar 2019 06:18  Phos  2.8     03-11  Mg     2.0     03-11      PT/INR - ( 11 Mar 2019 06:18 )   PT: 14.2 SEC;   INR: 1.24          PTT - ( 11 Mar 2019 06:18 )  PTT:62.2 SEC            RADIOLOGY & ADDITIONAL TESTS:    Imaging Personally Reviewed:< from: CT Head No Cont (03.11.19 @ 15:53) >  EXAM:  CT BRAIN        PROCEDURE DATE:  Mar 11 2019         INTERPRETATION:  HISTORY: Stroke.    Technique: Noncontrast CT of the head was performed.    Multiple contiguous axial images were acquired from the skull base to the   vertex without the administration of intravenous contrast. Coronal and   sagittal reformations were made.    COMPARISON: CT head dated 8/21/2016.    FINDINGS:  Chronic right MCA parietal lobe and left MCA parietal lobe infarctions   are unchanged.    Prominence of the ventricles and sulci is consistent with mild cerebral   line loss. Periventricular and hemispheric white matter lucencies are   consistent with mild to moderate severity microvascular ischemic change.    There is no intraparenchymal hematoma, mass effect or midline shift. No   abnormal extra-axial fluid collections are present. There is no evidence   of acute transcortical territorial infarction.    The calvarium is intact. The visualized intraorbital compartments,   paranasal sinuses and tympanomastoid cavities appear free of acute   disease.    IMPRESSION:  No interval change.  No acute intracranial hemorrhage.    < end of copied text >      Consultant(s) Notes Reviewed:      Care Discussed with Consultants/Other Providers: Patient is a 89y old  Male who presents with a chief complaint of Possible CVA (12 Mar 2019 07:42)      SUBJECTIVE / OVERNIGHT EVENTS: refused prbc last night to be given today     MEDICATIONS  (STANDING):  amiodarone    Tablet 100 milliGRAM(s) Oral daily  apixaban 5 milliGRAM(s) Oral every 12 hours  atorvastatin 80 milliGRAM(s) Oral at bedtime  docusate sodium 100 milliGRAM(s) Oral three times a day  donepezil 10 milliGRAM(s) Oral at bedtime  folic acid 1 milliGRAM(s) Oral daily  lactated ringers. 1000 milliLiter(s) (50 mL/Hr) IV Continuous <Continuous>  metoprolol tartrate 100 milliGRAM(s) Oral two times a day  montelukast 10 milliGRAM(s) Oral daily  pantoprazole    Tablet 40 milliGRAM(s) Oral before breakfast  senna 2 Tablet(s) Oral at bedtime  tamsulosin 0.4 milliGRAM(s) Oral at bedtime    MEDICATIONS  (PRN):  acetaminophen   Tablet .. 975 milliGRAM(s) Oral every 6 hours PRN Mild Pain (1 - 3)  belladonna 16.2 mG/opium 30 mg Suppository 1 Suppository(s) Rectal every 6 hours PRN bladder spasms  lidocaine 2% Gel 1 Application(s) Topical every 4 hours PRN pain from dorantes        CAPILLARY BLOOD GLUCOSE        I&O's Summary    11 Mar 2019 07:01  -  12 Mar 2019 07:00  --------------------------------------------------------  IN: 600 mL / OUT: 0 mL / NET: 600 mL        T(C): 36.5 (03-12-19 @ 06:17), Max: 37.2 (03-11-19 @ 10:10)  HR: 61 (03-12-19 @ 06:17) (61 - 79)  BP: 133/57 (03-12-19 @ 06:17) (110/58 - 151/58)  RR: 18 (03-12-19 @ 06:17) (16 - 18)  SpO2: 100% (03-12-19 @ 06:17) (96% - 100%)    PHYSICAL EXAM:  GENERAL: elderly male  lying in bed in NAD   HEAD:  Atraumatic, Normocephalic  EYES: L eye ptosis chronic conjunctiva and sclera clear  NECK: Supple, No elevated JVD  CHEST/LUNG: Clear to auscultation bilaterally; No wheeze  HEART: Regular rate and rhythm; No murmurs, rubs, or gallops  ABDOMEN: Soft, Nontender, Nondistended; Bowel sounds present. CBI light pink urine   EXTREMITIES: L groin site c/d/o   MENTAL STATUS/PSYCH:  AAO x2   SKIN: No rashes or lesions    LABS:                        7.4    7.57  )-----------( 120      ( 11 Mar 2019 06:18 )             24.8     03-11    141  |  102  |  14  ----------------------------<  104<H>  3.6   |  25  |  1.18    Ca    8.5      11 Mar 2019 06:18  Phos  2.8     03-11  Mg     2.0     03-11      PT/INR - ( 11 Mar 2019 06:18 )   PT: 14.2 SEC;   INR: 1.24          PTT - ( 11 Mar 2019 06:18 )  PTT:62.2 SEC            RADIOLOGY & ADDITIONAL TESTS:    Imaging Personally Reviewed:< from: CT Head No Cont (03.11.19 @ 15:53) >  EXAM:  CT BRAIN        PROCEDURE DATE:  Mar 11 2019         INTERPRETATION:  HISTORY: Stroke.    Technique: Noncontrast CT of the head was performed.    Multiple contiguous axial images were acquired from the skull base to the   vertex without the administration of intravenous contrast. Coronal and   sagittal reformations were made.    COMPARISON: CT head dated 8/21/2016.    FINDINGS:  Chronic right MCA parietal lobe and left MCA parietal lobe infarctions   are unchanged.    Prominence of the ventricles and sulci is consistent with mild cerebral   line loss. Periventricular and hemispheric white matter lucencies are   consistent with mild to moderate severity microvascular ischemic change.    There is no intraparenchymal hematoma, mass effect or midline shift. No   abnormal extra-axial fluid collections are present. There is no evidence   of acute transcortical territorial infarction.    The calvarium is intact. The visualized intraorbital compartments,   paranasal sinuses and tympanomastoid cavities appear free of acute   disease.    IMPRESSION:  No interval change.  No acute intracranial hemorrhage.    < end of copied text >      Consultant(s) Notes Reviewed:      Care Discussed with Consultants/Other Providers:

## 2019-03-12 NOTE — PROGRESS NOTE ADULT - ASSESSMENT
89y Male with hx of HTN, HLD, A. 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    Plan  - Pain control: Tylenol 975mg PO q6h PRN  - Regular diet  - C/w Eliquis   - CBI stopped in AM per Uro   - Appreciate medicine recs      - Neuro comments pending for left side numbness  - Appreciate speech & swallow recs  - PT c/s, appreciate recs  OT recs: rehab

## 2019-03-12 NOTE — PROGRESS NOTE ADULT - ASSESSMENT
89y Male with afib and recent cessation of DOAC in setting of TURBT presents with left acute limb ischemia secondary to occlusion of common femora through proximal SFA. Still has motor function now s/p left femoral cutdown and thrombectomy on 3/9.       - CBI clamped, manually irrigate PRN  - c/w B&O suppositories, lidocaine jelly to tip of penis PRN  - Anticoagulation per primary team, now on Eliquis  - keep dorantes for now, will monitor color off CBI on Eliquis

## 2019-03-12 NOTE — PROGRESS NOTE ADULT - SUBJECTIVE AND OBJECTIVE BOX
C-Team Surgery Progress Note     Subjective/24hour Events: CT head negative. Patient refused lab draw for AM labs. Pain controlled. Tolerating regular diet. No N/V. No CP or SOB.    Vital Signs:  Vital Signs Last 24 Hrs  T(C): 36.8 (12 Mar 2019 09:51), Max: 37.1 (11 Mar 2019 21:28)  T(F): 98.2 (12 Mar 2019 09:51), Max: 98.7 (11 Mar 2019 21:28)  HR: 92 (12 Mar 2019 09:51) (61 - 92)  BP: 110/69 (12 Mar 2019 09:51) (110/58 - 151/58)  BP(mean): --  RR: 16 (12 Mar 2019 09:51) (16 - 18)  SpO2: 100% (12 Mar 2019 09:51) (96% - 100%)    CAPILLARY BLOOD GLUCOSE          I&O's Detail    11 Mar 2019 07:01  -  12 Mar 2019 07:00  --------------------------------------------------------  IN:    lactated ringers.: 600 mL  Total IN: 600 mL    OUT:  Total OUT: 0 mL    Total NET: 600 mL            MEDICATIONS  (STANDING):  amiodarone    Tablet 100 milliGRAM(s) Oral daily  apixaban 5 milliGRAM(s) Oral every 12 hours  atorvastatin 80 milliGRAM(s) Oral at bedtime  docusate sodium 100 milliGRAM(s) Oral three times a day  donepezil 10 milliGRAM(s) Oral at bedtime  folic acid 1 milliGRAM(s) Oral daily  lactated ringers. 1000 milliLiter(s) (50 mL/Hr) IV Continuous <Continuous>  metoprolol tartrate 100 milliGRAM(s) Oral two times a day  montelukast 10 milliGRAM(s) Oral daily  pantoprazole    Tablet 40 milliGRAM(s) Oral before breakfast  senna 2 Tablet(s) Oral at bedtime  tamsulosin 0.4 milliGRAM(s) Oral at bedtime    MEDICATIONS  (PRN):  acetaminophen   Tablet .. 975 milliGRAM(s) Oral every 6 hours PRN Mild Pain (1 - 3)  belladonna 16.2 mG/opium 30 mg Suppository 1 Suppository(s) Rectal every 6 hours PRN bladder spasms  lidocaine 2% Gel 1 Application(s) Topical every 4 hours PRN pain from dorantes        Physical Exam:  Gen: NAD  LS: nml respiratory effort  Card: pulse regularly present  GI: abd soft, nontender  Ext: LLE superior thigh incision w/ staples, left AT signal      Labs:    03-11    141  |  102  |  14  ----------------------------<  104<H>  3.6   |  25  |  1.18    Ca    8.5      11 Mar 2019 06:18  Phos  2.8     03-11  Mg     2.0     03-11                              7.4    7.57  )-----------( 120      ( 11 Mar 2019 06:18 )             24.8     PT/INR - ( 11 Mar 2019 06:18 )   PT: 14.2 SEC;   INR: 1.24          PTT - ( 11 Mar 2019 06:18 )  PTT:62.2 SEC          Imaging:  EXAM:  CT BRAIN      PROCEDURE DATE:  Mar 11 2019     INTERPRETATION:  HISTORY: Stroke.    Technique: Noncontrast CT of the head was performed.    Multiple contiguous axial images were acquired from the skull base to the   vertex without the administration of intravenous contrast. Coronal and   sagittal reformations were made.    COMPARISON: CT head dated 8/21/2016.    FINDINGS:  Chronic right MCA parietal lobe and left MCA parietal lobe infarctions   are unchanged.    Prominence of the ventricles and sulci is consistent with mild cerebral   line loss. Periventricular and hemispheric white matter lucencies are   consistent with mild to moderate severity microvascular ischemic change.    There is no intraparenchymal hematoma, mass effect or midline shift. No   abnormal extra-axial fluid collections are present. There is no evidence   of acute transcortical territorial infarction.    The calvarium is intact. The visualized intraorbital compartments,   paranasal sinuses and tympanomastoid cavities appear free of acute   disease.    IMPRESSION:  No interval change.  No acute intracranial hemorrhage.

## 2019-03-12 NOTE — PROGRESS NOTE ADULT - NSICDXPROBLEM_GEN_ALL_CORE_FT
PROBLEM DIAGNOSES  Problem: Arterial thromboembolism  Assessment and Plan: CTA LE showing complete occlusion of L femoral artery  s/p L femoral thrombectomy and vein patch angioplasty  now on eliquis    Problem: Cerebrovascular accident (CVA), unspecified mechanism  Assessment and Plan: Initial CTA H/N without intra/extracranial stenosis without any acute infarct   Patient was off Eliquis for ~1 week due to TURB so likely caused by underlying Afib  No need for permissive HTN  Patient passed bedside dysphagia screen - f/u S/S   PT/OT  Statin  Neurology consultation appreciated -  repeat CT head-neg for acute CVA neuro f/u     Problem: Hematuria  Assessment and Plan: Due to recent TURB- path + invasive urothelial carcinoma    CBI as per urology  transfusion today f/u repeat H/H     Problem: Dyslipidemia  Assessment and Plan: cont statin    Problem: Hypertension  Assessment and Plan: -cont metorpolol monitor BP    Problem: Atrial fibrillation  Assessment and Plan: -cont amiodarone metoprolol and eliquis

## 2019-03-12 NOTE — SWALLOW BEDSIDE ASSESSMENT ADULT - COMMENTS
89y Male with hx of HTN, HLD, A. 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    As per discussion with Vascular Team, clinical swallow evaluation is cancelled. Team will reconsult if needed. 89y Male with hx of HTN, HLD, A. 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    Of Note: Patient was attempted on 3/9/2019 (See Note) however Patient was NPO for procedure.   As per discussion with Vascular Team, clinical swallow evaluation is cancelled. Team will reconsult if needed. 89y Male with hx of HTN, HLD, A. 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    Of Note: Patient was attempted on 3/9/2019 (See Note) however Patient was NPO for procedure.     As per discussion with Vascular Team today, clinical swallow evaluation is cancelled. Team will reconsult if needed.

## 2019-03-12 NOTE — PROGRESS NOTE ADULT - SUBJECTIVE AND OBJECTIVE BOX
Subjective  No overnight events. Pt doing well, denies bladder spasms    Objective    Vital signs  T(F): , Max: 98.9 (03-11-19 @ 10:10)  HR: 61 (03-12-19 @ 06:17)  BP: 133/57 (03-12-19 @ 06:17)  SpO2: 100% (03-12-19 @ 06:17)  Wt(kg): --    Output     03-11 @ 07:01  -  03-12 @ 07:00  --------------------------------------------------------  IN: 600 mL / OUT: 0 mL / NET: 600 mL        Gen: NAD  Abd: soft, NT, ND  : dorantes output clear on mod/slow gtt    Labs      03-11 @ 06:18    WBC 7.57  / Hct 24.8  / SCr 1.18         Imaging

## 2019-03-13 LAB
ANION GAP SERPL CALC-SCNC: 11 MMO/L — SIGNIFICANT CHANGE UP (ref 7–14)
APTT BLD: 33.3 SEC — SIGNIFICANT CHANGE UP (ref 27.5–36.3)
BLD GP AB SCN SERPL QL: NEGATIVE — SIGNIFICANT CHANGE UP
BUN SERPL-MCNC: 17 MG/DL — SIGNIFICANT CHANGE UP (ref 7–23)
CALCIUM SERPL-MCNC: 8.3 MG/DL — LOW (ref 8.4–10.5)
CHLORIDE SERPL-SCNC: 103 MMOL/L — SIGNIFICANT CHANGE UP (ref 98–107)
CO2 SERPL-SCNC: 26 MMOL/L — SIGNIFICANT CHANGE UP (ref 22–31)
CREAT SERPL-MCNC: 1.27 MG/DL — SIGNIFICANT CHANGE UP (ref 0.5–1.3)
GLUCOSE SERPL-MCNC: 100 MG/DL — HIGH (ref 70–99)
HCT VFR BLD CALC: 19.6 % — CRITICAL LOW (ref 39–50)
HCT VFR BLD CALC: 29.2 % — LOW (ref 39–50)
HGB BLD-MCNC: 5.8 G/DL — CRITICAL LOW (ref 13–17)
HGB BLD-MCNC: 9 G/DL — LOW (ref 13–17)
INR BLD: 1.52 — HIGH (ref 0.88–1.17)
MAGNESIUM SERPL-MCNC: 1.8 MG/DL — SIGNIFICANT CHANGE UP (ref 1.6–2.6)
MCHC RBC-ENTMCNC: 27 PG — SIGNIFICANT CHANGE UP (ref 27–34)
MCHC RBC-ENTMCNC: 27.4 PG — SIGNIFICANT CHANGE UP (ref 27–34)
MCHC RBC-ENTMCNC: 29.6 % — LOW (ref 32–36)
MCHC RBC-ENTMCNC: 30.8 % — LOW (ref 32–36)
MCV RBC AUTO: 89 FL — SIGNIFICANT CHANGE UP (ref 80–100)
MCV RBC AUTO: 91.2 FL — SIGNIFICANT CHANGE UP (ref 80–100)
NRBC # FLD: 0 K/UL — LOW (ref 25–125)
NRBC # FLD: 0 K/UL — LOW (ref 25–125)
PHOSPHATE SERPL-MCNC: 2.4 MG/DL — LOW (ref 2.5–4.5)
PLATELET # BLD AUTO: 108 K/UL — LOW (ref 150–400)
PLATELET # BLD AUTO: 133 K/UL — LOW (ref 150–400)
PMV BLD: 11.7 FL — SIGNIFICANT CHANGE UP (ref 7–13)
PMV BLD: 12.3 FL — SIGNIFICANT CHANGE UP (ref 7–13)
POTASSIUM SERPL-MCNC: 3.5 MMOL/L — SIGNIFICANT CHANGE UP (ref 3.5–5.3)
POTASSIUM SERPL-SCNC: 3.5 MMOL/L — SIGNIFICANT CHANGE UP (ref 3.5–5.3)
PROTHROM AB SERPL-ACNC: 17.6 SEC — HIGH (ref 9.8–13.1)
RBC # BLD: 2.15 M/UL — LOW (ref 4.2–5.8)
RBC # BLD: 3.28 M/UL — LOW (ref 4.2–5.8)
RBC # FLD: 16.7 % — HIGH (ref 10.3–14.5)
RBC # FLD: 16.8 % — HIGH (ref 10.3–14.5)
RH IG SCN BLD-IMP: POSITIVE — SIGNIFICANT CHANGE UP
SODIUM SERPL-SCNC: 140 MMOL/L — SIGNIFICANT CHANGE UP (ref 135–145)
WBC # BLD: 4.95 K/UL — SIGNIFICANT CHANGE UP (ref 3.8–10.5)
WBC # BLD: 6.21 K/UL — SIGNIFICANT CHANGE UP (ref 3.8–10.5)
WBC # FLD AUTO: 4.95 K/UL — SIGNIFICANT CHANGE UP (ref 3.8–10.5)
WBC # FLD AUTO: 6.21 K/UL — SIGNIFICANT CHANGE UP (ref 3.8–10.5)

## 2019-03-13 PROCEDURE — 99233 SBSQ HOSP IP/OBS HIGH 50: CPT

## 2019-03-13 PROCEDURE — 71045 X-RAY EXAM CHEST 1 VIEW: CPT | Mod: 26

## 2019-03-13 PROCEDURE — 93010 ELECTROCARDIOGRAM REPORT: CPT

## 2019-03-13 RX ORDER — POTASSIUM PHOSPHATE, MONOBASIC POTASSIUM PHOSPHATE, DIBASIC 236; 224 MG/ML; MG/ML
30 INJECTION, SOLUTION INTRAVENOUS ONCE
Qty: 0 | Refills: 0 | Status: COMPLETED | OUTPATIENT
Start: 2019-03-13 | End: 2019-03-13

## 2019-03-13 RX ORDER — MAGNESIUM SULFATE 500 MG/ML
1 VIAL (ML) INJECTION ONCE
Qty: 0 | Refills: 0 | Status: COMPLETED | OUTPATIENT
Start: 2019-03-13 | End: 2019-03-13

## 2019-03-13 RX ADMIN — AMIODARONE HYDROCHLORIDE 100 MILLIGRAM(S): 400 TABLET ORAL at 05:39

## 2019-03-13 RX ADMIN — ATORVASTATIN CALCIUM 80 MILLIGRAM(S): 80 TABLET, FILM COATED ORAL at 22:18

## 2019-03-13 RX ADMIN — SODIUM CHLORIDE 50 MILLILITER(S): 9 INJECTION, SOLUTION INTRAVENOUS at 22:18

## 2019-03-13 RX ADMIN — TAMSULOSIN HYDROCHLORIDE 0.4 MILLIGRAM(S): 0.4 CAPSULE ORAL at 22:18

## 2019-03-13 RX ADMIN — ATROPA BELLADONNA AND OPIUM 1 SUPPOSITORY(S): 16.2; 6 SUPPOSITORY RECTAL at 11:26

## 2019-03-13 RX ADMIN — Medication 650 MILLIGRAM(S): at 07:42

## 2019-03-13 RX ADMIN — APIXABAN 5 MILLIGRAM(S): 2.5 TABLET, FILM COATED ORAL at 05:40

## 2019-03-13 RX ADMIN — DONEPEZIL HYDROCHLORIDE 10 MILLIGRAM(S): 10 TABLET, FILM COATED ORAL at 22:19

## 2019-03-13 RX ADMIN — Medication 100 MILLIGRAM(S): at 05:40

## 2019-03-13 RX ADMIN — Medication 100 MILLIGRAM(S): at 22:18

## 2019-03-13 RX ADMIN — Medication 650 MILLIGRAM(S): at 17:26

## 2019-03-13 RX ADMIN — SODIUM CHLORIDE 50 MILLILITER(S): 9 INJECTION, SOLUTION INTRAVENOUS at 17:27

## 2019-03-13 RX ADMIN — Medication 100 GRAM(S): at 17:26

## 2019-03-13 RX ADMIN — LIDOCAINE 1 APPLICATION(S): 4 CREAM TOPICAL at 14:16

## 2019-03-13 RX ADMIN — POTASSIUM PHOSPHATE, MONOBASIC POTASSIUM PHOSPHATE, DIBASIC 83.33 MILLIMOLE(S): 236; 224 INJECTION, SOLUTION INTRAVENOUS at 05:39

## 2019-03-13 RX ADMIN — LIDOCAINE 1 APPLICATION(S): 4 CREAM TOPICAL at 22:18

## 2019-03-13 RX ADMIN — MONTELUKAST 10 MILLIGRAM(S): 4 TABLET, CHEWABLE ORAL at 11:30

## 2019-03-13 RX ADMIN — Medication 650 MILLIGRAM(S): at 11:27

## 2019-03-13 RX ADMIN — LIDOCAINE 1 APPLICATION(S): 4 CREAM TOPICAL at 17:27

## 2019-03-13 RX ADMIN — Medication 100 MILLIGRAM(S): at 11:28

## 2019-03-13 RX ADMIN — Medication 1 MILLIGRAM(S): at 11:29

## 2019-03-13 RX ADMIN — APIXABAN 5 MILLIGRAM(S): 2.5 TABLET, FILM COATED ORAL at 17:26

## 2019-03-13 RX ADMIN — LIDOCAINE 1 APPLICATION(S): 4 CREAM TOPICAL at 11:27

## 2019-03-13 RX ADMIN — PANTOPRAZOLE SODIUM 40 MILLIGRAM(S): 20 TABLET, DELAYED RELEASE ORAL at 06:09

## 2019-03-13 RX ADMIN — LIDOCAINE 1 APPLICATION(S): 4 CREAM TOPICAL at 02:12

## 2019-03-13 RX ADMIN — Medication 650 MILLIGRAM(S): at 05:40

## 2019-03-13 RX ADMIN — LIDOCAINE 1 APPLICATION(S): 4 CREAM TOPICAL at 05:40

## 2019-03-13 NOTE — PROGRESS NOTE ADULT - SUBJECTIVE AND OBJECTIVE BOX
JUNAID NESBITT  [id number]    Subjective:    Patient seen and examined. HH decreased at 5.8/19.6  Patient asymptomatic, denies dizziness/CP/SOB. Receiving pRBCs X 2 U   Urology DCd dorantes catheter, TOV        Objective:  T(C): 36.3 (03-13-19 @ 09:20), Max: 37.9 (03-12-19 @ 17:46)  HR: 66 (03-13-19 @ 09:20) (60 - 69)  BP: 102/60 (03-13-19 @ 09:20) (102/60 - 122/62)  RR: 18 (03-13-19 @ 09:20) (17 - 18)  SpO2: 98% (03-13-19 @ 09:20) (98% - 100%)  Wt(kg): --   03-13    140  |  103  |  17  ----------------------------<  100<H>  3.5   |  26  |  1.27    Ca    8.3<L>      13 Mar 2019 02:45  Phos  2.4     03-13  Mg     1.8     03-13                          5.8    4.95  )-----------( 108      ( 13 Mar 2019 02:45 )             19.6       03-12 @ 07:01 - 03-13 @ 07:00  --------------------------------------------------------  IN: 1446.6 mL / OUT: 1600 mL / NET: -153.4 mL    03-13 @ 07:01  -  03-13 @ 10:59  --------------------------------------------------------  IN: 0 mL / OUT: 0 mL / NET: 0 mL      PHYSICAL EXAM:    General:            MEDICATIONS  (STANDING):  acetaminophen   Tablet .. 650 milliGRAM(s) Oral every 6 hours  amiodarone    Tablet 100 milliGRAM(s) Oral daily  apixaban 5 milliGRAM(s) Oral every 12 hours  atorvastatin 80 milliGRAM(s) Oral at bedtime  belladonna 16.2 mG/opium 30 mg Suppository 1 Suppository(s) Rectal every 6 hours  docusate sodium 100 milliGRAM(s) Oral three times a day  donepezil 10 milliGRAM(s) Oral at bedtime  folic acid 1 milliGRAM(s) Oral daily  lactated ringers. 1000 milliLiter(s) (50 mL/Hr) IV Continuous <Continuous>  lidocaine 2% Gel 1 Application(s) Topical every 4 hours  magnesium sulfate  IVPB 1 Gram(s) IV Intermittent once  metoprolol tartrate 100 milliGRAM(s) Oral two times a day  montelukast 10 milliGRAM(s) Oral daily  pantoprazole    Tablet 40 milliGRAM(s) Oral before breakfast  senna 2 Tablet(s) Oral at bedtime  tamsulosin 0.4 milliGRAM(s) Oral at bedtime    MEDICATIONS  (PRN): JUNAID NESBITT  [id number]    Subjective:pt has no c/o states left leg is feeling good     Patient seen and examined. HH decreased at 5.8/19.6  Patient asymptomatic, denies dizziness/CP/SOB. Receiving pRBCs X 2 U   Urology DCd dorantes catheter, TOV        Objective:  T(C): 36.3 (03-13-19 @ 09:20), Max: 37.9 (03-12-19 @ 17:46)  HR: 66 (03-13-19 @ 09:20) (60 - 69)  BP: 102/60 (03-13-19 @ 09:20) (102/60 - 122/62)  RR: 18 (03-13-19 @ 09:20) (17 - 18)  SpO2: 98% (03-13-19 @ 09:20) (98% - 100%)  Wt(kg): --   03-13    140  |  103  |  17  ----------------------------<  100<H>  3.5   |  26  |  1.27    Ca    8.3<L>      13 Mar 2019 02:45  Phos  2.4     03-13  Mg     1.8     03-13                          5.8    4.95  )-----------( 108      ( 13 Mar 2019 02:45 )             19.6       03-12 @ 07:01  -  03-13 @ 07:00  --------------------------------------------------------  IN: 1446.6 mL / OUT: 1600 mL / NET: -153.4 mL    03-13 @ 07:01  -  03-13 @ 10:59  --------------------------------------------------------  IN: 0 mL / OUT: 0 mL / NET: 0 mL      PHYSICAL EXAM:    General: NAD  Vasc  sig dec in edema , warm well perfused w good cap refill       MEDICATIONS  (STANDING):  acetaminophen   Tablet .. 650 milliGRAM(s) Oral every 6 hours  amiodarone    Tablet 100 milliGRAM(s) Oral daily  apixaban 5 milliGRAM(s) Oral every 12 hours  atorvastatin 80 milliGRAM(s) Oral at bedtime  belladonna 16.2 mG/opium 30 mg Suppository 1 Suppository(s) Rectal every 6 hours  docusate sodium 100 milliGRAM(s) Oral three times a day  donepezil 10 milliGRAM(s) Oral at bedtime  folic acid 1 milliGRAM(s) Oral daily  lactated ringers. 1000 milliLiter(s) (50 mL/Hr) IV Continuous <Continuous>  lidocaine 2% Gel 1 Application(s) Topical every 4 hours  magnesium sulfate  IVPB 1 Gram(s) IV Intermittent once  metoprolol tartrate 100 milliGRAM(s) Oral two times a day  montelukast 10 milliGRAM(s) Oral daily  pantoprazole    Tablet 40 milliGRAM(s) Oral before breakfast  senna 2 Tablet(s) Oral at bedtime  tamsulosin 0.4 milliGRAM(s) Oral at bedtime

## 2019-03-13 NOTE — CHART NOTE - NSCHARTNOTEFT_GEN_A_CORE
< from: CT Head No Cont (03.11.19 @ 15:53) >      IMPRESSION:  No interval change.  No acute intracranial hemorrhage.    < end of copied text >    < from: CT Angio Neck w/ IV Cont (03.08.19 @ 10:25) >      Impression: CTA of the neck demonstrates no significant stenosis.    CT angiogram of the [place] demonstrates no significant stenosis   or aneurysms.    < end of copied text >    Imaging reviewed and shows no evidence of acute infarct. No further neurological work-up indicated. < from: CT Head No Cont (03.11.19 @ 15:53) >      IMPRESSION:  No interval change.  No acute intracranial hemorrhage.    < end of copied text >    < from: CT Angio Neck w/ IV Cont (03.08.19 @ 10:25) >      Impression: CTA of the neck demonstrates no significant stenosis.    CT angiogram of the [place] demonstrates no significant stenosis   or aneurysms.    < end of copied text >    Patient has chronic ischemic infarcts. CTH shows no new/acute infarcts.

## 2019-03-13 NOTE — PROGRESS NOTE ADULT - NSICDXPROBLEM_GEN_ALL_CORE_FT
PROBLEM DIAGNOSES  Problem: Arterial thromboembolism  Assessment and Plan: continue anticoag rx     Problem: Ischemia of extremity  Assessment and Plan: continue anticoag rx

## 2019-03-13 NOTE — PROGRESS NOTE ADULT - SUBJECTIVE AND OBJECTIVE BOX
Patient is a 89y old  Male who presents with a chief complaint of Possible CVA (13 Mar 2019 06:35)      SUBJECTIVE / OVERNIGHT EVENTS: Pt in NAD. CBI removed by urology. afebrile denies N/V/D     MEDICATIONS  (STANDING):  acetaminophen   Tablet .. 650 milliGRAM(s) Oral every 6 hours  amiodarone    Tablet 100 milliGRAM(s) Oral daily  apixaban 5 milliGRAM(s) Oral every 12 hours  atorvastatin 80 milliGRAM(s) Oral at bedtime  belladonna 16.2 mG/opium 30 mg Suppository 1 Suppository(s) Rectal every 6 hours  docusate sodium 100 milliGRAM(s) Oral three times a day  donepezil 10 milliGRAM(s) Oral at bedtime  folic acid 1 milliGRAM(s) Oral daily  lactated ringers. 1000 milliLiter(s) (50 mL/Hr) IV Continuous <Continuous>  lidocaine 2% Gel 1 Application(s) Topical every 4 hours  magnesium sulfate  IVPB 1 Gram(s) IV Intermittent once  metoprolol tartrate 100 milliGRAM(s) Oral two times a day  montelukast 10 milliGRAM(s) Oral daily  pantoprazole    Tablet 40 milliGRAM(s) Oral before breakfast  senna 2 Tablet(s) Oral at bedtime  tamsulosin 0.4 milliGRAM(s) Oral at bedtime    MEDICATIONS  (PRN):        CAPILLARY BLOOD GLUCOSE        I&O's Summary    12 Mar 2019 07:01  -  13 Mar 2019 07:00  --------------------------------------------------------  IN: 1446.6 mL / OUT: 1600 mL / NET: -153.4 mL        T(C): 36.3 (03-13-19 @ 09:20), Max: 37.9 (03-12-19 @ 17:46)  HR: 66 (03-13-19 @ 09:20) (60 - 69)  BP: 102/60 (03-13-19 @ 09:20) (102/60 - 122/62)  RR: 18 (03-13-19 @ 09:20) (17 - 18)  SpO2: 98% (03-13-19 @ 09:20) (98% - 100%)    PHYSICAL EXAM:  GENERAL: elderly male  lying in bed in NAD   HEAD:  Atraumatic, Normocephalic  EYES: L eye ptosis chronic conjunctiva and sclera clear  NECK: Supple, No elevated JVD  CHEST/LUNG: Clear to auscultation bilaterally; No wheeze  HEART: Regular rate and rhythm; No murmurs, rubs, or gallops  ABDOMEN: Soft, Nontender, Nondistended; Bowel sounds present.    EXTREMITIES: L groin site c/d/i  MENTAL STATUS/PSYCH:  AAOx2   SKIN: No rashes or lesions    LABS:                        5.8    4.95  )-----------( 108      ( 13 Mar 2019 02:45 )             19.6     03-13    140  |  103  |  17  ----------------------------<  100<H>  3.5   |  26  |  1.27    Ca    8.3<L>      13 Mar 2019 02:45  Phos  2.4     03-13  Mg     1.8     03-13      PT/INR - ( 13 Mar 2019 02:45 )   PT: 17.6 SEC;   INR: 1.52          PTT - ( 13 Mar 2019 02:45 )  PTT:33.3 SEC            RADIOLOGY & ADDITIONAL TESTS:    Imaging Personally Reviewed:    Consultant(s) Notes Reviewed:      Care Discussed with Consultants/Other Providers:

## 2019-03-13 NOTE — PROGRESS NOTE ADULT - ASSESSMENT
89y Male with hx of HTN, HLD, A. 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 Receiving pRBC for decrease in HH this am, otherwise stable    -  Continue Eliquis   - pRBCs X 2 units   - Post transfusion CBC   - TOV following removal of dorantes   - Continue regular diet.  - Appreciate medicine recs.  - Dispo planning ongoing     - PT c/s, appreciate recs.  OT recs: rehab    Pager [phone]

## 2019-03-13 NOTE — PROGRESS NOTE ADULT - SUBJECTIVE AND OBJECTIVE BOX
Subjective  No overnight events. Pt "feels lousy - as usual".    Objective    Vital signs  T(F): , Max: 100.2 (03-12-19 @ 17:46)  HR: 67 (03-13-19 @ 05:37)  BP: 115/58 (03-13-19 @ 05:37)  SpO2: 100% (03-13-19 @ 05:37)  Wt(kg): --    Output     03-11 @ 07:01  -  03-12 @ 07:00  --------------------------------------------------------  IN: 600 mL / OUT: 0 mL / NET: 600 mL    03-12 @ 07:01  -  03-13 @ 06:35  --------------------------------------------------------  IN: 1183.3 mL / OUT: 1600 mL / NET: -416.7 mL        Gen: NAD  Abd: soft, NT, ND  : jose e output emi    Labs      03-13 @ 02:45    WBC 4.95  / Hct 19.6  / SCr 1.27       Imaging Subjective  No overnight events. Currently receiving pRBC for Hct 19.6. . Pt "feels lousy - as usual".    Objective    Vital signs  T(F): , Max: 100.2 (03-12-19 @ 17:46)  HR: 67 (03-13-19 @ 05:37)  BP: 115/58 (03-13-19 @ 05:37)  SpO2: 100% (03-13-19 @ 05:37)  Wt(kg): --    Output     03-11 @ 07:01  -  03-12 @ 07:00  --------------------------------------------------------  IN: 600 mL / OUT: 0 mL / NET: 600 mL    03-12 @ 07:01  -  03-13 @ 06:35  --------------------------------------------------------  IN: 1183.3 mL / OUT: 1600 mL / NET: -416.7 mL        Gen: NAD  Abd: soft, NT, ND  : jose e output emi    Labs      03-13 @ 02:45    WBC 4.95  / Hct 19.6  / SCr 1.27       Imaging

## 2019-03-13 NOTE — PROVIDER CONTACT NOTE (OTHER) - SITUATION
SURJIT Bass #[id number] informed that patient continuous bladder irrigation is clamped however there is no order to do so and can she kindly place an order

## 2019-03-13 NOTE — PROVIDER CONTACT NOTE (OTHER) - BACKGROUND
s/p TURBT + left femoral thrombectomy
Patient is s/p Left thrombectomy, vein patch and angioplasty
Patient s/p TURBT on 3/4 and s/p left femoral thrombectomy on 3/8
Patient stat post TURP 3/4 and Thrombectomy 3/8, hx. of BPH and A.fib
Patient stat post TURP on 3/4/18, dorantes DC 6:30am by urology resident 3/13, patient due to void.

## 2019-03-13 NOTE — PROVIDER CONTACT NOTE (OTHER) - ACTION/TREATMENT ORDERED:
AS per MD Villalobos this is patient's baseline. Will continue to monitor.
MD will come speak to pt.
RN awaiting order from SURJIT Bass #[id number].
No order given at time, will see patient
Obtain second  post void residual per bladder scan

## 2019-03-13 NOTE — PROGRESS NOTE ADULT - ASSESSMENT
89y Male with afib and recent cessation of DOAC in setting of TURBT presents with left acute limb ischemia secondary to occlusion of common femora through proximal SFA. Still has motor function now s/p left femoral cutdown and thrombectomy on 3/9.     - dorantes output emi after CBI clamped  - dorantes removed in AM, await TOV - please obtain PVR, order placed on [place]  - pt w/high grade nonmuscle invasive urothelial CA invading lamina propria, pt to f/u w/Dr. Rdz as outpt   - please call if questions 89y Male with afib and recent cessation of DOAC in setting of TURBT presents with left acute limb ischemia secondary to occlusion of common femora through proximal SFA. Still has motor function now s/p left femoral cutdown and thrombectomy on 3/9.     - dorantes output emi after CBI clamped  - dorantes removed in AM, await TOV - please obtain PVR, order placed on [place]  - encourage fluid intake  - pt w/high grade nonmuscle invasive urothelial CA invading lamina propria, pt to f/u w/Dr. Rdz as outpt   - please call if questions 89y Male with afib and recent cessation of DOAC in setting of TURBT presents with left acute limb ischemia secondary to occlusion of common femora through proximal SFA. Still has motor function now s/p left femoral cutdown and thrombectomy on 3/9.     - dorantes output emi after CBI clamped  - dorantes removed in AM, await TOV - please obtain PVR, order placed on [place]  - encourage fluid intake  - trend H/H  - pt w/high grade nonmuscle invasive urothelial CA invading lamina propria, pt to f/u w/Dr. Rdz as outpt   - please call if questions

## 2019-03-13 NOTE — PROGRESS NOTE ADULT - NSICDXPROBLEM_GEN_ALL_CORE_FT
PROBLEM DIAGNOSES  Problem: Hematuria  Assessment and Plan: Due to recent TURB- path + invasive urothelial carcinoma    CBI --->removed   -AM H/H low suspect this a lab error no obvious signs of hematuria CBI and dorantes removed by urology no overt black or bloody stool currently getting 2 u prbc     Problem: Arterial thromboembolism  Assessment and Plan: CTA LE showing complete occlusion of L femoral artery  s/p L femoral thrombectomy and vein patch angioplasty  hold eliquis until repeat H/H     Problem: Cerebrovascular accident (CVA), unspecified mechanism  Assessment and Plan: Initial CTA H/N without intra/extracranial stenosis without any acute infarct   Patient was off Eliquis for ~1 week due to TURB so likely caused by underlying Afib  No need for permissive HTN  Patient passed bedside dysphagia screen - f/u S/S   PT/OT  Statin  Neurology consultation appreciated -  repeat CT head-neg for acute CVA neuro f/u     Problem: Dyslipidemia  Assessment and Plan: cont statin    Problem: Hypertension  Assessment and Plan: -cont metorpolol monitor BP    Problem: Atrial fibrillation  Assessment and Plan: -cont amiodarone metoprolol and hold eliquis as above     Problem: Need for prophylactic measure  Assessment and Plan: restart eliquis if H/H stable

## 2019-03-13 NOTE — PROVIDER CONTACT NOTE (OTHER) - ASSESSMENT
pt axox3 w/ periods of confusion. night shift PCA attempted to draw AM blood-unsuccesful. Pt from then on has been refusing blood draw + transfusion despite education on importance of receiving blood transfusion.  v/s/s. hemodynamically stable at this time. Continuous bladder irrigation clamped by urology. urine remains clear with slight blood tinge. no other s/s bleeding noted.
Patient axox3 with pds of confusion. Left groin dsg intact. Patient with heparin infusing via left peripheral iv. Continuous bladder irrigation continues.
Patient axox3 with periods of confusion. Left groin dsg in place.
Patient denies any pain/cramping, manually irrigation attempt, unable to remove clot.
Patient incontinent at time, no discomfort noted.

## 2019-03-14 LAB
ANION GAP SERPL CALC-SCNC: 7 MMO/L — SIGNIFICANT CHANGE UP (ref 7–14)
BUN SERPL-MCNC: 19 MG/DL — SIGNIFICANT CHANGE UP (ref 7–23)
CALCIUM SERPL-MCNC: 8.7 MG/DL — SIGNIFICANT CHANGE UP (ref 8.4–10.5)
CHLORIDE SERPL-SCNC: 106 MMOL/L — SIGNIFICANT CHANGE UP (ref 98–107)
CO2 SERPL-SCNC: 27 MMOL/L — SIGNIFICANT CHANGE UP (ref 22–31)
CREAT SERPL-MCNC: 1.24 MG/DL — SIGNIFICANT CHANGE UP (ref 0.5–1.3)
GLUCOSE SERPL-MCNC: 97 MG/DL — SIGNIFICANT CHANGE UP (ref 70–99)
HCT VFR BLD CALC: 27 % — LOW (ref 39–50)
HGB BLD-MCNC: 8.3 G/DL — LOW (ref 13–17)
MAGNESIUM SERPL-MCNC: 2 MG/DL — SIGNIFICANT CHANGE UP (ref 1.6–2.6)
MCHC RBC-ENTMCNC: 27.6 PG — SIGNIFICANT CHANGE UP (ref 27–34)
MCHC RBC-ENTMCNC: 30.7 % — LOW (ref 32–36)
MCV RBC AUTO: 89.7 FL — SIGNIFICANT CHANGE UP (ref 80–100)
NRBC # FLD: 0 K/UL — LOW (ref 25–125)
PHOSPHATE SERPL-MCNC: 2.8 MG/DL — SIGNIFICANT CHANGE UP (ref 2.5–4.5)
PLATELET # BLD AUTO: 127 K/UL — LOW (ref 150–400)
PMV BLD: 11.7 FL — SIGNIFICANT CHANGE UP (ref 7–13)
POTASSIUM SERPL-MCNC: 4.1 MMOL/L — SIGNIFICANT CHANGE UP (ref 3.5–5.3)
POTASSIUM SERPL-SCNC: 4.1 MMOL/L — SIGNIFICANT CHANGE UP (ref 3.5–5.3)
RBC # BLD: 3.01 M/UL — LOW (ref 4.2–5.8)
RBC # FLD: 16.6 % — HIGH (ref 10.3–14.5)
SODIUM SERPL-SCNC: 140 MMOL/L — SIGNIFICANT CHANGE UP (ref 135–145)
WBC # BLD: 6.2 K/UL — SIGNIFICANT CHANGE UP (ref 3.8–10.5)
WBC # FLD AUTO: 6.2 K/UL — SIGNIFICANT CHANGE UP (ref 3.8–10.5)

## 2019-03-14 PROCEDURE — 99233 SBSQ HOSP IP/OBS HIGH 50: CPT

## 2019-03-14 RX ADMIN — Medication 1 MILLIGRAM(S): at 11:47

## 2019-03-14 RX ADMIN — LIDOCAINE 1 APPLICATION(S): 4 CREAM TOPICAL at 14:57

## 2019-03-14 RX ADMIN — Medication 100 MILLIGRAM(S): at 11:47

## 2019-03-14 RX ADMIN — Medication 100 MILLIGRAM(S): at 14:57

## 2019-03-14 RX ADMIN — PANTOPRAZOLE SODIUM 40 MILLIGRAM(S): 20 TABLET, DELAYED RELEASE ORAL at 06:32

## 2019-03-14 RX ADMIN — ATROPA BELLADONNA AND OPIUM 1 SUPPOSITORY(S): 16.2; 6 SUPPOSITORY RECTAL at 00:11

## 2019-03-14 RX ADMIN — Medication 650 MILLIGRAM(S): at 00:13

## 2019-03-14 RX ADMIN — Medication 650 MILLIGRAM(S): at 18:09

## 2019-03-14 RX ADMIN — SODIUM CHLORIDE 50 MILLILITER(S): 9 INJECTION, SOLUTION INTRAVENOUS at 05:48

## 2019-03-14 RX ADMIN — Medication 100 MILLIGRAM(S): at 05:42

## 2019-03-14 RX ADMIN — Medication 650 MILLIGRAM(S): at 05:48

## 2019-03-14 RX ADMIN — LIDOCAINE 1 APPLICATION(S): 4 CREAM TOPICAL at 11:48

## 2019-03-14 RX ADMIN — LIDOCAINE 1 APPLICATION(S): 4 CREAM TOPICAL at 02:16

## 2019-03-14 RX ADMIN — SENNA PLUS 2 TABLET(S): 8.6 TABLET ORAL at 22:26

## 2019-03-14 RX ADMIN — ATORVASTATIN CALCIUM 80 MILLIGRAM(S): 80 TABLET, FILM COATED ORAL at 22:26

## 2019-03-14 RX ADMIN — Medication 100 MILLIGRAM(S): at 22:26

## 2019-03-14 RX ADMIN — Medication 650 MILLIGRAM(S): at 11:47

## 2019-03-14 RX ADMIN — LIDOCAINE 1 APPLICATION(S): 4 CREAM TOPICAL at 18:10

## 2019-03-14 RX ADMIN — Medication 650 MILLIGRAM(S): at 12:43

## 2019-03-14 RX ADMIN — ATROPA BELLADONNA AND OPIUM 1 SUPPOSITORY(S): 16.2; 6 SUPPOSITORY RECTAL at 15:00

## 2019-03-14 RX ADMIN — AMIODARONE HYDROCHLORIDE 100 MILLIGRAM(S): 400 TABLET ORAL at 05:42

## 2019-03-14 RX ADMIN — APIXABAN 5 MILLIGRAM(S): 2.5 TABLET, FILM COATED ORAL at 18:09

## 2019-03-14 RX ADMIN — MONTELUKAST 10 MILLIGRAM(S): 4 TABLET, CHEWABLE ORAL at 11:48

## 2019-03-14 RX ADMIN — ATROPA BELLADONNA AND OPIUM 1 SUPPOSITORY(S): 16.2; 6 SUPPOSITORY RECTAL at 05:48

## 2019-03-14 RX ADMIN — DONEPEZIL HYDROCHLORIDE 10 MILLIGRAM(S): 10 TABLET, FILM COATED ORAL at 22:26

## 2019-03-14 RX ADMIN — TAMSULOSIN HYDROCHLORIDE 0.4 MILLIGRAM(S): 0.4 CAPSULE ORAL at 22:26

## 2019-03-14 RX ADMIN — Medication 650 MILLIGRAM(S): at 00:11

## 2019-03-14 RX ADMIN — SODIUM CHLORIDE 50 MILLILITER(S): 9 INJECTION, SOLUTION INTRAVENOUS at 00:11

## 2019-03-14 RX ADMIN — ATROPA BELLADONNA AND OPIUM 1 SUPPOSITORY(S): 16.2; 6 SUPPOSITORY RECTAL at 00:13

## 2019-03-14 RX ADMIN — LIDOCAINE 1 APPLICATION(S): 4 CREAM TOPICAL at 05:43

## 2019-03-14 RX ADMIN — ATROPA BELLADONNA AND OPIUM 1 SUPPOSITORY(S): 16.2; 6 SUPPOSITORY RECTAL at 14:57

## 2019-03-14 RX ADMIN — Medication 650 MILLIGRAM(S): at 05:42

## 2019-03-14 RX ADMIN — APIXABAN 5 MILLIGRAM(S): 2.5 TABLET, FILM COATED ORAL at 05:42

## 2019-03-14 RX ADMIN — LIDOCAINE 1 APPLICATION(S): 4 CREAM TOPICAL at 22:26

## 2019-03-14 NOTE — PROGRESS NOTE ADULT - ASSESSMENT
89y Male with hx of HTN, HLD, A. 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 Receiving pRBC for decrease in HH this am, otherwise stable    -  Continue Eliquis   - pRBCs X 2 units   - Post transfusion CBC   - TOV following removal of dorantes   - Continue regular diet.  - Appreciate medicine recs.  - Dispo planning ongoing     - PT c/s, appreciate recs.  OT recs: rehab    Pager [phone]

## 2019-03-14 NOTE — PROGRESS NOTE ADULT - ATTENDING COMMENTS
I have personally  seen and examined the patient today and have noted the findings and formulated the plan of care.
I have seen and examined the patient today and agree with  the  evaluation, assessment and plan of the surgical house officer
I have personally  seen and examined the patient today and have noted the findings and formulated the plan of care.

## 2019-03-14 NOTE — PROGRESS NOTE ADULT - NSICDXPROBLEM_GEN_ALL_CORE_FT
PROBLEM DIAGNOSES  Problem: Hematuria  Assessment and Plan: Due to recent TURB- path + invasive urothelial carcinoma    CBI --->removed   -AM H/H low suspect this a lab error no obvious signs of hematuria CBI and dorantes removed by urology no overt black or bloody stool currently getting 2 u prbc     Problem: Arterial thromboembolism  Assessment and Plan: CTA LE showing complete occlusion of L femoral artery  s/p L femoral thrombectomy and vein patch angioplasty  hold eliquis until repeat H/H     Problem: Cerebrovascular accident (CVA), unspecified mechanism  Assessment and Plan: Initial CTA H/N without intra/extracranial stenosis without any acute infarct   Patient was off Eliquis for ~1 week due to TURB so likely caused by underlying Afib  No need for permissive HTN  Patient passed bedside dysphagia screen - f/u S/S   PT/OT  Statin  Neurology consultation appreciated -  repeat CT head-neg for acute CVA neuro f/u     Problem: Dyslipidemia  Assessment and Plan: cont statin    Problem: Hypertension  Assessment and Plan: -cont metorpolol monitor BP    Problem: Atrial fibrillation  Assessment and Plan: -cont amiodarone metoprolol and hold eliquis as above     Problem: Need for prophylactic measure  Assessment and Plan: restart eliquis if H/H stable PROBLEM DIAGNOSES  Problem: Hematuria  Assessment and Plan: Due to recent TURB- path + invasive urothelial carcinoma    CBI --->removed   -TOV as per urology  -s/p 2 u prbc with appropriate rise    Problem: Arterial thromboembolism  Assessment and Plan: CTA LE showing complete occlusion of L femoral artery  s/p L femoral thrombectomy and vein patch angioplasty  on eliquis    Problem: Cerebrovascular accident (CVA), unspecified mechanism  Assessment and Plan: Initial CTA H/N without intra/extracranial stenosis without any acute infarct   Patient was off Eliquis for ~1 week due to TURB so likely caused by underlying Afib  No need for permissive HTN  Patient passed bedside dysphagia screen - f/u S/S   PT/OT  Statin  Neurology consultation appreciated -  repeat CT head-neg for acute CVA neuro f/u     Problem: Dyslipidemia  Assessment and Plan: cont statin    Problem: Hypertension  Assessment and Plan: -cont metorpolol monitor BP    Problem: Atrial fibrillation  Assessment and Plan: -cont amiodarone metoprolol and restart eliquis    Problem: Need for prophylactic measure  Assessment and Plan: on eliquis

## 2019-03-14 NOTE — PROGRESS NOTE ADULT - SUBJECTIVE AND OBJECTIVE BOX
Patient is a 89 year old male who presents with a chief complaint of possible CVA. (14 Mar 2019 09:16)      SUBJECTIVE: Patient seen and examined at the bedside.  He is alert.  Patient does not endorse any complaints this AM.  Denies dizziness, headache, chest pain, palpitations, shortness of breath, abdominal pain, nausea, vomiting and diarrhea.      MEDICATIONS:  acetaminophen   Tablet .. 650 milliGRAM(s) Oral every 6 hours  amiodarone    Tablet 100 milliGRAM(s) Oral daily  apixaban 5 milliGRAM(s) Oral every 12 hours  atorvastatin 80 milliGRAM(s) Oral at bedtime  belladonna 16.2 mG/opium 30 mg Suppository 1 Suppository(s) Rectal every 6 hours  docusate sodium 100 milliGRAM(s) Oral three times a day  donepezil 10 milliGRAM(s) Oral at bedtime  folic acid 1 milliGRAM(s) Oral daily  lactated ringers. 1000 milliLiter(s) IV Continuous <Continuous>  lidocaine 2% Gel 1 Application(s) Topical every 4 hours  metoprolol tartrate 100 milliGRAM(s) Oral two times a day  montelukast 10 milliGRAM(s) Oral daily  pantoprazole    Tablet 40 milliGRAM(s) Oral before breakfast  senna 2 Tablet(s) Oral at bedtime  tamsulosin 0.4 milliGRAM(s) Oral at bedtime      VITALS:  Vital Signs Last 24 Hrs  T(C): 36.5 (14 Mar 2019 10:15), Max: 36.8 (13 Mar 2019 13:05)  T(F): 97.7 (14 Mar 2019 10:15), Max: 98.2 (13 Mar 2019 13:05)  HR: 57 (14 Mar 2019 10:15) (57 - 65)  BP: 113/62 (14 Mar 2019 10:15) (113/62 - 133/71)  RR: 16 (14 Mar 2019 10:15) (16 - 20)  SpO2: 97% (14 Mar 2019 10:15) (97% - 100%)      INS AND OUTS:  I&O's Detail    13 Mar 2019 07:01  -  14 Mar 2019 07:00  --------------------------------------------------------  IN:    lactated ringers.: 600 mL    Packed Red Blood Cells: 310 mL  Total IN: 910 mL    OUT:    Voided: 2 mL  Total OUT: 2 mL    Total NET: 908 mL      PHYSICAL EXAM:  GENERAL: Laying down, in no acute distress  PSYCH: AAOx4  EXTREMITIES: Left groin incision healing - clean, dry and intact  VASCULAR: Palpable popliteal pulse      LABS:    CBC:                      8.3    6.20  )-----------( 127      ( 14 Mar 2019 05:36 )             27.0     CHEM:  140  |  106  |  19  ----------------------------<  97  4.1   |  27  |  1.24    Ca    8.7      14 Mar 2019 05:36  Phos  2.8     03-14  Mg     2.0     03-14 Patient is a 89 year old male who presents with a chief complaint of possible CVA. (14 Mar 2019 09:16)      SUBJECTIVE: Patient seen and examined at the bedside.  He is alert.  Patient does not endorse any complaints this AM.  Denies dizziness, headache, chest pain, palpitations, shortness of breath, abdominal pain, nausea, vomiting and diarrhea.      MEDICATIONS:  acetaminophen   Tablet .. 650 milliGRAM(s) Oral every 6 hours  amiodarone    Tablet 100 milliGRAM(s) Oral daily  apixaban 5 milliGRAM(s) Oral every 12 hours  atorvastatin 80 milliGRAM(s) Oral at bedtime  belladonna 16.2 mG/opium 30 mg Suppository 1 Suppository(s) Rectal every 6 hours  docusate sodium 100 milliGRAM(s) Oral three times a day  donepezil 10 milliGRAM(s) Oral at bedtime  folic acid 1 milliGRAM(s) Oral daily  lactated ringers. 1000 milliLiter(s) IV Continuous <Continuous>  lidocaine 2% Gel 1 Application(s) Topical every 4 hours  metoprolol tartrate 100 milliGRAM(s) Oral two times a day  montelukast 10 milliGRAM(s) Oral daily  pantoprazole    Tablet 40 milliGRAM(s) Oral before breakfast  senna 2 Tablet(s) Oral at bedtime  tamsulosin 0.4 milliGRAM(s) Oral at bedtime      VITALS:  Vital Signs Last 24 Hrs  T(C): 36.5 (14 Mar 2019 10:15), Max: 36.8 (13 Mar 2019 13:05)  T(F): 97.7 (14 Mar 2019 10:15), Max: 98.2 (13 Mar 2019 13:05)  HR: 57 (14 Mar 2019 10:15) (57 - 65)  BP: 113/62 (14 Mar 2019 10:15) (113/62 - 133/71)  RR: 16 (14 Mar 2019 10:15) (16 - 20)  SpO2: 97% (14 Mar 2019 10:15) (97% - 100%)      INS AND OUTS:  I&O's Detail    13 Mar 2019 07:01  -  14 Mar 2019 07:00  --------------------------------------------------------  IN:    lactated ringers.: 600 mL    Packed Red Blood Cells: 310 mL  Total IN: 910 mL    OUT:    Voided: 2 mL  Total OUT: 2 mL    Total NET: 908 mL      PHYSICAL EXAM:  GENERAL: Laying down, in no acute distress  PSYCH: AAOx4  EXTREMITIES: Left groin incision healing - clean, dry and intact  VASCULAR: Palpable left popliteal pulse      LABS:    CBC:                      8.3    6.20  )-----------( 127      ( 14 Mar 2019 05:36 )             27.0     CHEM:  140  |  106  |  19  ----------------------------<  97  4.1   |  27  |  1.24    Ca    8.7      14 Mar 2019 05:36  Phos  2.8     03-14  Mg     2.0     03-14

## 2019-03-14 NOTE — PROGRESS NOTE ADULT - ASSESSMENT
Patient is a 89 year old male with a PMHx of HTN, HLD, AFib (on Eliquis - but has not taken it in 1 week due to TURBT on 3/4/19), asthma, BPH, CVA x2 (most recent 2012 - with residual bilateral vision loss), prior alcohol abuse, ptosis to left eye (from prior eyelid lift) and dementia who presented with left acute limb ischemia secondary to occlusion of common femoral through proximal SFA.  Patient is now S/P left femoral cutdown and thrombectomy on 3/9/19.     PLAN:  - Continue Eliquis   - Hemoglobin and hematocrit stable this AM - S/P 2 units PRBC on 3/13/19  - Continue regular diet - tolerating well  - Appreciate medicine recommendations  - Discharge planning to rehab      #[id number] C Team Surgery

## 2019-03-14 NOTE — PROGRESS NOTE ADULT - SUBJECTIVE AND OBJECTIVE BOX
Patient is a 89y old  Male who presents with a chief complaint of Possible CVA (13 Mar 2019 10:59)      SUBJECTIVE / OVERNIGHT EVENTS:    MEDICATIONS  (STANDING):  acetaminophen   Tablet .. 650 milliGRAM(s) Oral every 6 hours  amiodarone    Tablet 100 milliGRAM(s) Oral daily  apixaban 5 milliGRAM(s) Oral every 12 hours  atorvastatin 80 milliGRAM(s) Oral at bedtime  belladonna 16.2 mG/opium 30 mg Suppository 1 Suppository(s) Rectal every 6 hours  docusate sodium 100 milliGRAM(s) Oral three times a day  donepezil 10 milliGRAM(s) Oral at bedtime  folic acid 1 milliGRAM(s) Oral daily  lactated ringers. 1000 milliLiter(s) (50 mL/Hr) IV Continuous <Continuous>  lidocaine 2% Gel 1 Application(s) Topical every 4 hours  metoprolol tartrate 100 milliGRAM(s) Oral two times a day  montelukast 10 milliGRAM(s) Oral daily  pantoprazole    Tablet 40 milliGRAM(s) Oral before breakfast  senna 2 Tablet(s) Oral at bedtime  tamsulosin 0.4 milliGRAM(s) Oral at bedtime    MEDICATIONS  (PRN):        CAPILLARY BLOOD GLUCOSE        I&O's Summary    13 Mar 2019 07:01  -  14 Mar 2019 07:00  --------------------------------------------------------  IN: 910 mL / OUT: 2 mL / NET: 908 mL        T(C): 36.3 (03-14-19 @ 05:39), Max: 36.8 (03-13-19 @ 13:05)  HR: 64 (03-14-19 @ 05:39) (60 - 66)  BP: 133/71 (03-14-19 @ 05:39) (102/60 - 133/71)  RR: 18 (03-14-19 @ 05:39) (16 - 20)  SpO2: 100% (03-14-19 @ 05:39) (98% - 100%)    PHYSICAL EXAM:  GENERAL: elderly male  lying in bed in NAD   HEAD:  Atraumatic, Normocephalic  EYES: L eye ptosis chronic conjunctiva and sclera clear  NECK: Supple, No elevated JVD  CHEST/LUNG: Clear to auscultation bilaterally; No wheeze  HEART: Regular rate and rhythm; No murmurs, rubs, or gallops  ABDOMEN: Soft, Nontender, Nondistended; Bowel sounds present.    EXTREMITIES: L groin site c/d/i  MENTAL STATUS/PSYCH:  AAOx2   SKIN: No rashes or lesions    LABS:                        8.3    6.20  )-----------( 127      ( 14 Mar 2019 05:36 )             27.0     03-14    140  |  106  |  19  ----------------------------<  97  4.1   |  27  |  1.24    Ca    8.7      14 Mar 2019 05:36  Phos  2.8     03-14  Mg     2.0     03-14      PT/INR - ( 13 Mar 2019 02:45 )   PT: 17.6 SEC;   INR: 1.52          PTT - ( 13 Mar 2019 02:45 )  PTT:33.3 SEC            RADIOLOGY & ADDITIONAL TESTS:    Imaging Personally Reviewed:    Consultant(s) Notes Reviewed:      Care Discussed with Consultants/Other Providers: Patient is a 89y old  Male who presents with a chief complaint of Possible CVA (13 Mar 2019 10:59)      SUBJECTIVE / OVERNIGHT EVENTS: no acute complaints ON no fever/SOB/N/V    MEDICATIONS  (STANDING):  acetaminophen   Tablet .. 650 milliGRAM(s) Oral every 6 hours  amiodarone    Tablet 100 milliGRAM(s) Oral daily  apixaban 5 milliGRAM(s) Oral every 12 hours  atorvastatin 80 milliGRAM(s) Oral at bedtime  belladonna 16.2 mG/opium 30 mg Suppository 1 Suppository(s) Rectal every 6 hours  docusate sodium 100 milliGRAM(s) Oral three times a day  donepezil 10 milliGRAM(s) Oral at bedtime  folic acid 1 milliGRAM(s) Oral daily  lactated ringers. 1000 milliLiter(s) (50 mL/Hr) IV Continuous <Continuous>  lidocaine 2% Gel 1 Application(s) Topical every 4 hours  metoprolol tartrate 100 milliGRAM(s) Oral two times a day  montelukast 10 milliGRAM(s) Oral daily  pantoprazole    Tablet 40 milliGRAM(s) Oral before breakfast  senna 2 Tablet(s) Oral at bedtime  tamsulosin 0.4 milliGRAM(s) Oral at bedtime    MEDICATIONS  (PRN):        CAPILLARY BLOOD GLUCOSE        I&O's Summary    13 Mar 2019 07:01  -  14 Mar 2019 07:00  --------------------------------------------------------  IN: 910 mL / OUT: 2 mL / NET: 908 mL        T(C): 36.3 (03-14-19 @ 05:39), Max: 36.8 (03-13-19 @ 13:05)  HR: 64 (03-14-19 @ 05:39) (60 - 66)  BP: 133/71 (03-14-19 @ 05:39) (102/60 - 133/71)  RR: 18 (03-14-19 @ 05:39) (16 - 20)  SpO2: 100% (03-14-19 @ 05:39) (98% - 100%)    PHYSICAL EXAM:  GENERAL: elderly male  lying in bed in NAD   HEAD:  Atraumatic, Normocephalic  EYES: L eye ptosis chronic conjunctiva and sclera clear  NECK: Supple, No elevated JVD  CHEST/LUNG: Clear to auscultation bilaterally; No wheeze  HEART: Regular rate and rhythm; No murmurs, rubs, or gallops  ABDOMEN: Soft, Nontender, Nondistended; Bowel sounds present.    EXTREMITIES: L groin site c/d/i  MENTAL STATUS/PSYCH:  AAOx2   SKIN: No rashes or lesions    LABS:                        8.3    6.20  )-----------( 127      ( 14 Mar 2019 05:36 )             27.0     03-14    140  |  106  |  19  ----------------------------<  97  4.1   |  27  |  1.24    Ca    8.7      14 Mar 2019 05:36  Phos  2.8     03-14  Mg     2.0     03-14      PT/INR - ( 13 Mar 2019 02:45 )   PT: 17.6 SEC;   INR: 1.52          PTT - ( 13 Mar 2019 02:45 )  PTT:33.3 SEC            RADIOLOGY & ADDITIONAL TESTS:    Imaging Personally Reviewed:    Consultant(s) Notes Reviewed:      Care Discussed with Consultants/Other Providers:

## 2019-03-14 NOTE — PROGRESS NOTE ADULT - SUBJECTIVE AND OBJECTIVE BOX
Patient is a 89y old  Male who presents with a chief complaint of Possible CVA (14 Mar 2019 09:16)      Vascular Surgery Attending Progress Note    Interval HPI: pt w/o c/o     Medications:  acetaminophen   Tablet .. 650 milliGRAM(s) Oral every 6 hours  amiodarone    Tablet 100 milliGRAM(s) Oral daily  apixaban 5 milliGRAM(s) Oral every 12 hours  atorvastatin 80 milliGRAM(s) Oral at bedtime  belladonna 16.2 mG/opium 30 mg Suppository 1 Suppository(s) Rectal every 6 hours  docusate sodium 100 milliGRAM(s) Oral three times a day  donepezil 10 milliGRAM(s) Oral at bedtime  folic acid 1 milliGRAM(s) Oral daily  lactated ringers. 1000 milliLiter(s) IV Continuous <Continuous>  lidocaine 2% Gel 1 Application(s) Topical every 4 hours  metoprolol tartrate 100 milliGRAM(s) Oral two times a day  montelukast 10 milliGRAM(s) Oral daily  pantoprazole    Tablet 40 milliGRAM(s) Oral before breakfast  senna 2 Tablet(s) Oral at bedtime  tamsulosin 0.4 milliGRAM(s) Oral at bedtime      Vital Signs Last 24 Hrs  T(C): 36.3 (14 Mar 2019 05:39), Max: 36.8 (13 Mar 2019 13:05)  T(F): 97.4 (14 Mar 2019 05:39), Max: 98.2 (13 Mar 2019 13:05)  HR: 64 (14 Mar 2019 05:39) (60 - 65)  BP: 133/71 (14 Mar 2019 05:39) (115/59 - 133/71)  BP(mean): --  RR: 18 (14 Mar 2019 05:39) (16 - 20)  SpO2: 100% (14 Mar 2019 05:39) (99% - 100%)  I&O's Summary    13 Mar 2019 07:01  -  14 Mar 2019 07:00  --------------------------------------------------------  IN: 910 mL / OUT: 2 mL / NET: 908 mL        Physical Exam:  Neuro  A&Ox3 VSS  Vascular:  left groin incision healing well lle warm well perfused     LABS:                        8.3    6.20  )-----------( 127      ( 14 Mar 2019 05:36 )             27.0     03-14    140  |  106  |  19  ----------------------------<  97  4.1   |  27  |  1.24    Ca    8.7      14 Mar 2019 05:36  Phos  2.8     03-14  Mg     2.0     03-14      PT/INR - ( 13 Mar 2019 02:45 )   PT: 17.6 SEC;   INR: 1.52          PTT - ( 13 Mar 2019 02:45 )  PTT:33.3 SEC    YOKO COPELAND MD  [phone]

## 2019-03-15 ENCOUNTER — TRANSCRIPTION ENCOUNTER (OUTPATIENT)
Age: 84
End: 2019-03-15

## 2019-03-15 VITALS — WEIGHT: 156.97 LBS

## 2019-03-15 DIAGNOSIS — G81.90 HEMIPLEGIA, UNSPECIFIED AFFECTING UNSPECIFIED SIDE: ICD-10-CM

## 2019-03-15 LAB
ANION GAP SERPL CALC-SCNC: 10 MMO/L — SIGNIFICANT CHANGE UP (ref 7–14)
BUN SERPL-MCNC: 17 MG/DL — SIGNIFICANT CHANGE UP (ref 7–23)
CALCIUM SERPL-MCNC: 8.5 MG/DL — SIGNIFICANT CHANGE UP (ref 8.4–10.5)
CHLORIDE SERPL-SCNC: 106 MMOL/L — SIGNIFICANT CHANGE UP (ref 98–107)
CO2 SERPL-SCNC: 25 MMOL/L — SIGNIFICANT CHANGE UP (ref 22–31)
CREAT SERPL-MCNC: 1.09 MG/DL — SIGNIFICANT CHANGE UP (ref 0.5–1.3)
GLUCOSE SERPL-MCNC: 88 MG/DL — SIGNIFICANT CHANGE UP (ref 70–99)
HCT VFR BLD CALC: 30 % — LOW (ref 39–50)
HGB BLD-MCNC: 9.1 G/DL — LOW (ref 13–17)
MAGNESIUM SERPL-MCNC: 1.9 MG/DL — SIGNIFICANT CHANGE UP (ref 1.6–2.6)
MCHC RBC-ENTMCNC: 27.9 PG — SIGNIFICANT CHANGE UP (ref 27–34)
MCHC RBC-ENTMCNC: 30.3 % — LOW (ref 32–36)
MCV RBC AUTO: 92 FL — SIGNIFICANT CHANGE UP (ref 80–100)
NRBC # FLD: 0 K/UL — LOW (ref 25–125)
PHOSPHATE SERPL-MCNC: 2.4 MG/DL — LOW (ref 2.5–4.5)
PLATELET # BLD AUTO: 150 K/UL — SIGNIFICANT CHANGE UP (ref 150–400)
PMV BLD: 11.3 FL — SIGNIFICANT CHANGE UP (ref 7–13)
POTASSIUM SERPL-MCNC: 3.8 MMOL/L — SIGNIFICANT CHANGE UP (ref 3.5–5.3)
POTASSIUM SERPL-SCNC: 3.8 MMOL/L — SIGNIFICANT CHANGE UP (ref 3.5–5.3)
RBC # BLD: 3.26 M/UL — LOW (ref 4.2–5.8)
RBC # FLD: 16.9 % — HIGH (ref 10.3–14.5)
SODIUM SERPL-SCNC: 141 MMOL/L — SIGNIFICANT CHANGE UP (ref 135–145)
WBC # BLD: 6.02 K/UL — SIGNIFICANT CHANGE UP (ref 3.8–10.5)
WBC # FLD AUTO: 6.02 K/UL — SIGNIFICANT CHANGE UP (ref 3.8–10.5)

## 2019-03-15 PROCEDURE — 99232 SBSQ HOSP IP/OBS MODERATE 35: CPT

## 2019-03-15 RX ORDER — FUROSEMIDE 40 MG
1 TABLET ORAL
Qty: 0 | Refills: 0 | COMMUNITY

## 2019-03-15 RX ORDER — POTASSIUM PHOSPHATE, MONOBASIC POTASSIUM PHOSPHATE, DIBASIC 236; 224 MG/ML; MG/ML
15 INJECTION, SOLUTION INTRAVENOUS ONCE
Qty: 0 | Refills: 0 | Status: COMPLETED | OUTPATIENT
Start: 2019-03-15 | End: 2019-03-15

## 2019-03-15 RX ORDER — MAGNESIUM SULFATE 500 MG/ML
1 VIAL (ML) INJECTION ONCE
Qty: 0 | Refills: 0 | Status: COMPLETED | OUTPATIENT
Start: 2019-03-15 | End: 2019-03-15

## 2019-03-15 RX ORDER — LIDOCAINE 4 G/100G
1 CREAM TOPICAL
Qty: 0 | Refills: 0 | DISCHARGE
Start: 2019-03-15

## 2019-03-15 RX ADMIN — Medication 650 MILLIGRAM(S): at 11:07

## 2019-03-15 RX ADMIN — LIDOCAINE 1 APPLICATION(S): 4 CREAM TOPICAL at 14:52

## 2019-03-15 RX ADMIN — Medication 1 MILLIGRAM(S): at 11:07

## 2019-03-15 RX ADMIN — LIDOCAINE 1 APPLICATION(S): 4 CREAM TOPICAL at 10:24

## 2019-03-15 RX ADMIN — PANTOPRAZOLE SODIUM 40 MILLIGRAM(S): 20 TABLET, DELAYED RELEASE ORAL at 07:17

## 2019-03-15 RX ADMIN — MONTELUKAST 10 MILLIGRAM(S): 4 TABLET, CHEWABLE ORAL at 11:07

## 2019-03-15 RX ADMIN — APIXABAN 5 MILLIGRAM(S): 2.5 TABLET, FILM COATED ORAL at 05:34

## 2019-03-15 RX ADMIN — LIDOCAINE 1 APPLICATION(S): 4 CREAM TOPICAL at 02:52

## 2019-03-15 RX ADMIN — Medication 100 MILLIGRAM(S): at 05:34

## 2019-03-15 RX ADMIN — Medication 650 MILLIGRAM(S): at 12:39

## 2019-03-15 RX ADMIN — POTASSIUM PHOSPHATE, MONOBASIC POTASSIUM PHOSPHATE, DIBASIC 62.5 MILLIMOLE(S): 236; 224 INJECTION, SOLUTION INTRAVENOUS at 10:55

## 2019-03-15 RX ADMIN — AMIODARONE HYDROCHLORIDE 100 MILLIGRAM(S): 400 TABLET ORAL at 05:33

## 2019-03-15 RX ADMIN — Medication 100 GRAM(S): at 10:55

## 2019-03-15 RX ADMIN — Medication 650 MILLIGRAM(S): at 00:24

## 2019-03-15 RX ADMIN — Medication 650 MILLIGRAM(S): at 05:34

## 2019-03-15 RX ADMIN — Medication 100 MILLIGRAM(S): at 10:23

## 2019-03-15 RX ADMIN — LIDOCAINE 1 APPLICATION(S): 4 CREAM TOPICAL at 05:34

## 2019-03-15 NOTE — PROGRESS NOTE ADULT - SUBJECTIVE AND OBJECTIVE BOX
C-Team Surgery Progress Note     Subjective/24hour Events: No acute events overnight. Pain controlled. Tolerating soft diet. No N/V. No CP or SOB.    Vital Signs:  Vital Signs Last 24 Hrs  T(C): 36.4 (15 Mar 2019 10:00), Max: 36.6 (14 Mar 2019 17:47)  T(F): 97.5 (15 Mar 2019 10:00), Max: 97.8 (14 Mar 2019 17:47)  HR: 70 (15 Mar 2019 10:00) (59 - 73)  BP: 128/69 (15 Mar 2019 10:00) (128/69 - 165/69)  BP(mean): --  RR: 17 (15 Mar 2019 10:00) (16 - 19)  SpO2: 100% (15 Mar 2019 10:00) (100% - 100%)    CAPILLARY BLOOD GLUCOSE          I&O's Detail        MEDICATIONS  (STANDING):  acetaminophen   Tablet .. 650 milliGRAM(s) Oral every 6 hours  amiodarone    Tablet 100 milliGRAM(s) Oral daily  apixaban 5 milliGRAM(s) Oral every 12 hours  atorvastatin 80 milliGRAM(s) Oral at bedtime  docusate sodium 100 milliGRAM(s) Oral three times a day  donepezil 10 milliGRAM(s) Oral at bedtime  folic acid 1 milliGRAM(s) Oral daily  lidocaine 2% Gel 1 Application(s) Topical every 4 hours  magnesium sulfate  IVPB 1 Gram(s) IV Intermittent once  metoprolol tartrate 100 milliGRAM(s) Oral two times a day  montelukast 10 milliGRAM(s) Oral daily  pantoprazole    Tablet 40 milliGRAM(s) Oral before breakfast  potassium phosphate IVPB 15 milliMole(s) IV Intermittent once  senna 2 Tablet(s) Oral at bedtime  tamsulosin 0.4 milliGRAM(s) Oral at bedtime    MEDICATIONS  (PRN):        Physical Exam:  Gen: NAD  LS: nml respiratory effort  Card: pulse regularly present  GI: abd soft, nontender  Ext: left groin incision c/d/i, palpable left popliteal pulse      Labs:    03-15    141  |  106  |  17  ----------------------------<  88  3.8   |  25  |  1.09    Ca    8.5      15 Mar 2019 06:50  Phos  2.4     03-15  Mg     1.9     03-15                              9.1    6.02  )-----------( 150      ( 15 Mar 2019 06:50 )             30.0

## 2019-03-15 NOTE — DISCHARGE NOTE NURSING/CASE MANAGEMENT/SOCIAL WORK - NSDCDPATPORTLINK_GEN_ALL_CORE
You can access the [facility] Portal, offered by [facility], by registering with the following website: [email or web address]

## 2019-03-15 NOTE — DISCHARGE NOTE PROVIDER - CARE PROVIDER_API CALL
Armen Thomas)  Vascular Surgery  [address], [address]  [place] [address]  Phone: [phone]  Fax: [phone]  Follow Up Time: 1 week    Jimmy Rdz)  Urology  [address], [address]  [place] [address]  Phone: [phone]  Fax: [phone]  Follow Up Time: 1 week

## 2019-03-15 NOTE — PROGRESS NOTE ADULT - SUBJECTIVE AND OBJECTIVE BOX
Patient is a 89y old  Male who presents with a chief complaint of Possible CVA (15 Mar 2019 10:45)      SUBJECTIVE / OVERNIGHT EVENTS: Pt in NAD no acute events ON denies CP/SOB/N/V    MEDICATIONS  (STANDING):  acetaminophen   Tablet .. 650 milliGRAM(s) Oral every 6 hours  amiodarone    Tablet 100 milliGRAM(s) Oral daily  apixaban 5 milliGRAM(s) Oral every 12 hours  atorvastatin 80 milliGRAM(s) Oral at bedtime  docusate sodium 100 milliGRAM(s) Oral three times a day  donepezil 10 milliGRAM(s) Oral at bedtime  folic acid 1 milliGRAM(s) Oral daily  lidocaine 2% Gel 1 Application(s) Topical every 4 hours  metoprolol tartrate 100 milliGRAM(s) Oral two times a day  montelukast 10 milliGRAM(s) Oral daily  pantoprazole    Tablet 40 milliGRAM(s) Oral before breakfast  senna 2 Tablet(s) Oral at bedtime  tamsulosin 0.4 milliGRAM(s) Oral at bedtime    MEDICATIONS  (PRN):        CAPILLARY BLOOD GLUCOSE        I&O's Summary      T(C): 36.4 (03-15-19 @ 10:00), Max: 36.6 (03-14-19 @ 17:47)  HR: 70 (03-15-19 @ 10:00) (59 - 73)  BP: 128/69 (03-15-19 @ 10:00) (128/69 - 165/69)  RR: 17 (03-15-19 @ 10:00) (16 - 19)  SpO2: 100% (03-15-19 @ 10:00) (100% - 100%)    PHYSICAL EXAM:  GENERAL: elderly male  lying in bed in NAD   HEAD:  Atraumatic, Normocephalic  EYES: L eye ptosis chronic conjunctiva and sclera clear  NECK: Supple, No elevated JVD  CHEST/LUNG: Clear to auscultation bilaterally; No wheeze  HEART: Regular rate and rhythm; No murmurs, rubs, or gallops  ABDOMEN: Soft, Nontender, Nondistended; Bowel sounds present.    EXTREMITIES: L groin site c/d/i  MENTAL STATUS/PSYCH:  AAOx2   SKIN: No rashes or lesions      LABS:                        9.1    6.02  )-----------( 150      ( 15 Mar 2019 06:50 )             30.0     03-15    141  |  106  |  17  ----------------------------<  88  3.8   |  25  |  1.09    Ca    8.5      15 Mar 2019 06:50  Phos  2.4     03-15  Mg     1.9     03-15                  RADIOLOGY & ADDITIONAL TESTS:    Imaging Personally Reviewed:    Consultant(s) Notes Reviewed:      Care Discussed with Consultants/Other Providers:

## 2019-03-15 NOTE — DIETITIAN INITIAL EVALUATION ADULT. - OTHER INFO
Patient seen for extended length of stay. Per chart:89 y/o male with a PMHx of HTN, HLD, A. 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 Per wife- patient with variable PO intake during hospital stay- reported PO intake depends on patient's mood, patient consuming about 50-75% of meals and supplement. Patient denies any nausea/vomiting/diarrhea/constipation or difficulty chewing and swallowing. Patient reports no food allergies or intolerances. Wife reported about two week ago weight was about 157 pounds. Current weight: 148.3 pounds-- possible weight change due to variable PO intake

## 2019-03-15 NOTE — DIETITIAN INITIAL EVALUATION ADULT. - PERTINENT MEDS FT
MEDICATIONS  (STANDING):  acetaminophen   Tablet .. 650 milliGRAM(s) Oral every 6 hours  amiodarone    Tablet 100 milliGRAM(s) Oral daily  apixaban 5 milliGRAM(s) Oral every 12 hours  atorvastatin 80 milliGRAM(s) Oral at bedtime  docusate sodium 100 milliGRAM(s) Oral three times a day  donepezil 10 milliGRAM(s) Oral at bedtime  folic acid 1 milliGRAM(s) Oral daily  lidocaine 2% Gel 1 Application(s) Topical every 4 hours  metoprolol tartrate 100 milliGRAM(s) Oral two times a day  montelukast 10 milliGRAM(s) Oral daily  pantoprazole    Tablet 40 milliGRAM(s) Oral before breakfast  senna 2 Tablet(s) Oral at bedtime  tamsulosin 0.4 milliGRAM(s) Oral at bedtime

## 2019-03-15 NOTE — PROGRESS NOTE ADULT - ASSESSMENT
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 Patient is now S/P left femoral cutdown and thrombectomy on 3/9/19.     PLAN:  - Pain control: Tylenol 650mg PO q6h  - Soft diet w/ Ensure Enlive cans  - Continue Eliquis   - Hemoglobin and hematocrit stable s/p 2 units PRBC on 3/13/19      - continue to monitor  - Dispo: d/c to rehab when approved

## 2019-03-15 NOTE — DIETITIAN INITIAL EVALUATION ADULT. - PROBLEM SELECTOR PLAN 2
Left limb noted to be cool and dusky; unclear when started  Possible acute ischemic limb 2/2 emboli due to history of Afib while anticoagulation held for TURB  Vasc sx consult called  Arterial US ordered  Likely CTA of LE will be needed but had CTA H/N Today - will defer to vascular for timing  Heparin gtt ordered for Afib

## 2019-03-15 NOTE — DISCHARGE NOTE NURSING/CASE MANAGEMENT/SOCIAL WORK - NSDCPNDISPN_GEN_ALL_CORE
Activities of daily living, including home environment that might     exacerbate pain or reduce effectiveness of the pain management plan of care as well as strategies to address these issues/Side effects of pain management treatment/Safe use, storage and disposal of opioids when prescribed

## 2019-03-15 NOTE — DIETITIAN INITIAL EVALUATION ADULT. - PERTINENT LABORATORY DATA
03-15 Na141 mmol/L Glu 88 mg/dL K+ 3.8 mmol/L Cr  1.09 mg/dL BUN 17 mg/dL 03-15 Phos 2.4 mg/dL<L> 03-09 [id number] 5.4 % 03-09 Chol 100 mg/dL<L> LDL 46 mg/dL HDL 52 mg/dL Trig 38 mg/dL

## 2019-03-15 NOTE — PROGRESS NOTE ADULT - NSHPATTENDINGPLANDISCUSS_GEN_ALL_CORE
Vascular
surg ho
vascular
vascular
surg ho
VAscular
surg ho
pa, pt, daughter
pa, pt, daughter
Vascular

## 2019-03-15 NOTE — PROGRESS NOTE ADULT - PROVIDER SPECIALTY LIST ADULT
Anesthesia
Hospitalist
Surgery
Urology
Vascular Surgery
Surgery
Hospitalist

## 2019-03-15 NOTE — PROGRESS NOTE ADULT - REASON FOR ADMISSION
Possible CVA

## 2019-03-15 NOTE — DISCHARGE NOTE PROVIDER - HOSPITAL COURSE
89 y/o male with a PMHx of HTN, HLD, A. 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 Patient reports severe 10/10 that began at his ankle and then radiated all the way up the left side of his body including his entire LUE. Pain began when patient was laying down in his bed. Patients wife then helped him out of bed into a chair and called an ambulance. Patient then reported feeling numbness and tingling throughout the left side of his body. He also reported weakness to the left leg and felt as though he was unable to stand up or walk. At baseline patient ambulates with minimal assistance. Patients wife reports she gave him Tylenol before arrival to the ED but it did not provide him with any relief. Patient denies N/V/D, chest pain, SOB, headache, dizziness, syncope, diaphoresis, changes in vision, and abdominal pain. Since arrival to the ED patients pain and weakness has improved but not resolved completely. Patient still feels numbness from his left foot up to his left hip.        In ED patient underwent a stroke workup. He had a CT head on 3/8  which showed lacunar Old lacunar infarct involving the left basal ganglia region is identified. 3/8 CT angio of head and neck demonstrates no significant stenosis. CT angiogram of the [place] demonstrates no significant stenosis or aneurysms. In ED it was also noted that patient had a cold left foot and ankle so vascular surgery was consulted.        A CTA of LE showed Complete occlusion of the left common femoral artery, extending through the proximal superficial femoral artery. The remainder of the arterial  system is opacified either via slow flow or retrograde flow. 2. No right lower extremity significant stenosis is shown. The right lower extremity system below the knee is opacified on the late phase imaging, however, this could be secondary to the rate of acquisition rather than slow flow given the absence of a proximal right-sided stenosis. 3. Redemonstration of a right hepatic lesion measuring 4.7 x 4.6 cm is better evaluated on recent portal venous phase CT. 4. Complex multiloculated collection in the anterior wall appears unchanged measuring 12.9 x 5.4 cm in its largest component. 5. Unchanged heterogeneous mass adjacent to the left external iliac vessels.         Pt was started on a heparin gtt for left acute limb ischemia secondary to occlusion of common femora through proximal SFA.  On 3/9 pt started to have hematuria due to AC. Per urology pts dorantes was changed and he was started on CBI. On 3/10 pt underwent Left femoral thrombectomy and vein patch. angioplasty. Pt tolerated procedure well. His diet was advanced as tolerated. On 3/11 pt was restarted on Eliquis and his urine was observed for further bleeding. At this time it was noted that patients h/h was low, and he was refusing any further labs/ blood transfusion. He had a repeat CT head on 3/11 which showed No interval change. No acute intracranial hemorrhage. On 3/13 after further discussion patient agreeable to blood work which showed further decreased in h/h to 5.8/19.6. Pt agreed to blood transfusion and he was given 2 units PRBC to which he responded appropriately. His [place] was also removed and he passed a TOV.  Pt labs remained stable.  As per attending, Pt has been deemed stable for discharge to rehab at this time.

## 2019-03-15 NOTE — DISCHARGE NOTE PROVIDER - CARE PROVIDERS DIRECT ADDRESSES
[email or web address]

## 2019-03-15 NOTE — DIETITIAN INITIAL EVALUATION ADULT. - ENERGY NEEDS
Ht: 67 inches Wt: 148.3 pounds  BMI: 23.1 kg/m2 IBW: 148 pounds (+/-10%) %IBW: 100%  Edema: no edema noted.  Skin: intact, no pressure injuries noted

## 2019-03-15 NOTE — DIETITIAN INITIAL EVALUATION ADULT. - PROBLEM SELECTOR PLAN 1
Admit to tele  Initial CTA H/N without intra/extracranial stenosis but showed multiple areas of possible CVA new from previous imaging  Patient was off Eliquis for ~1 week due to TURB so likely caused by underlying Afib  Neurology consultation appreciated - will check MRI Non-con  No need for permissive HTN  Patient passed bedside dysphagia screen - official S/S ordered  PT/OT  Aspirin/Statin  Per Neuro will await  Clearance for Anticoagulation prior to Hep gtt/Eliquis  A1C and LIpid profile

## 2019-03-15 NOTE — DIETITIAN INITIAL EVALUATION ADULT. - NS AS NUTRI INTERV ED CONTENT
Educated patient on general/overall healthy diet-reviewed portion sizing , "my plate" model, label reading. Recommended patient to increase consumption of whole grains, consume lean sources of protein and fiber, avoid/limit concentrated sweets and foods high in sodium and fats. Recommend patient to increased consumption of fruit and vegetables, healthy fats and oils, small frequent meals/snacks.

## 2019-03-15 NOTE — DIETITIAN INITIAL EVALUATION ADULT. - PROBLEM SELECTOR PLAN 3
Admit to telemetry for monitoring   Serial EKGs  Trend cardiac enzymes   Hold Elliquis until urology follow up  CHADS2-Vasc: 5 Lifelong AC as indicated

## 2019-03-15 NOTE — DISCHARGE NOTE NURSING/CASE MANAGEMENT/SOCIAL WORK - NSDCPEEMAIL_GEN_ALL_CORE
[facility] for Tobacco Control email [email or web address]

## 2019-03-15 NOTE — DISCHARGE NOTE NURSING/CASE MANAGEMENT/SOCIAL WORK - NSDCPNINST_GEN_ALL_CORE
Patient provided discharge instructions. Patient educated on medications and discharge plan and followup visits. Patient is ready for discharge. Denies chest pain and respiratory distress. Patient in no acute distress at time of discharge.

## 2019-03-15 NOTE — DISCHARGE NOTE NURSING/CASE MANAGEMENT/SOCIAL WORK - NSDCPEPTSTRK_GEN_ALL_CORE
Prescribed medications/Risk factors for stroke/Stroke support groups for patients, families, and friends/Stroke warning signs and symptoms/Call 911 for stroke/Need for follow up after discharge/Signs and symptoms of stroke/Stroke education booklet

## 2019-03-15 NOTE — DIETITIAN INITIAL EVALUATION ADULT. - PROBLEM SELECTOR PLAN 6
Due to recent TURB   called for consult - advising to keep Warner in place for now and wait for further follow up before resuming anticoagulation  Will need continued outpatient follow up with Dr. Zaragoza.

## 2019-03-15 NOTE — DISCHARGE NOTE PROVIDER - PROVIDER TOKENS
PROVIDER:[TOKEN:[[id number]],FOLLOWUP:[1 week]],PROVIDER:[TOKEN:[[id number]],FOLLOWUP:[1 week]]

## 2019-03-15 NOTE — PROGRESS NOTE ADULT - NSICDXPROBLEM_GEN_ALL_CORE_FT
PROBLEM DIAGNOSES  Problem: Hematuria  Assessment and Plan: Due to recent TURB- path + invasive urothelial carcinoma    CBI --->removed   -TOV as per urology  -s/p 2 u prbc with appropriate rise    Problem: Cerebrovascular accident (CVA), unspecified mechanism  Assessment and Plan: Initial CTA H/N without intra/extracranial stenosis without any acute infarct   -statin  case d/w Neuro attending Dr. Reyes repeat CT head neg likely chronic infarcts  seen so would cont current management with eliquis for reducution of future events.     Problem: Arterial thromboembolism  Assessment and Plan: CTA LE showing complete occlusion of L femoral artery  s/p L femoral thrombectomy and vein patch angioplasty  on eliquis    Problem: Dyslipidemia  Assessment and Plan: cont statin    Problem: Hypertension  Assessment and Plan: -cont metorpolol monitor BP    Problem: Atrial fibrillation  Assessment and Plan: -cont amiodarone metoprolol and restart eliquis    Problem: Need for prophylactic measure  Assessment and Plan: on eliquis

## 2019-03-15 NOTE — DISCHARGE NOTE NURSING/CASE MANAGEMENT/SOCIAL WORK - NSDCPEWEB_GEN_ALL_CORE
[facility] for Tobacco Control website --- [email or web address] website --- [email or web address]

## 2019-03-15 NOTE — DISCHARGE NOTE PROVIDER - NSDCFUADDINST_GEN_ALL_CORE_FT
WOUND CARE:  Please keep incisions clean and dry. Please do not Scrub or rub incisions. Do not use lotion or powder on incisions.   BATHING: You may shower and/or sponge bathe. You may use warm soapy water in the shower and rinse, pat dry.  ACTIVITY: No heavy lifting or straining. Otherwise, you may return to your usual level of physical activity. If you are taking narcotic pain medication DO NOT drive a car, operate machinery or make important decisions.  DIET: Return to your usual diet.  NOTIFY YOUR SURGEON IF: You have any bleeding that does not stop, any pus draining from your wound(s), increased pain at surgical site, any fever (over 100.4 F) persistent nausea/vomiting, or if your pain is not controlled on your discharge pain medications.    Please follow up with your primary care doctor in 1 week.

## 2019-03-19 LAB — SURGICAL PATHOLOGY STUDY: SIGNIFICANT CHANGE UP

## 2019-03-25 ENCOUNTER — APPOINTMENT (OUTPATIENT)
Dept: VASCULAR SURGERY | Facility: CLINIC | Age: 84
End: 2019-03-25
Payer: MEDICARE

## 2019-03-25 VITALS
TEMPERATURE: 98 F | HEART RATE: 88 BPM | BODY MASS INDEX: 25.11 KG/M2 | SYSTOLIC BLOOD PRESSURE: 149 MMHG | HEIGHT: 67 IN | WEIGHT: 160 LBS | DIASTOLIC BLOOD PRESSURE: 98 MMHG

## 2019-03-25 DIAGNOSIS — I99.8 OTHER DISORDER OF CIRCULATORY SYSTEM: ICD-10-CM

## 2019-03-25 PROCEDURE — 99024 POSTOP FOLLOW-UP VISIT: CPT

## 2019-03-25 NOTE — PROCEDURE
[FreeTextEntry1] : Left groin incision all staples completely removed\par Betadine and Steris applied

## 2019-03-25 NOTE — ASSESSMENT
[Arterial/Venous Disease] : arterial/venous disease [Medication Management] : medication management [Foot care/Footwear] : foot care/footwear [FreeTextEntry1] : Impression: Acute left leg ischemia s/p left CFA, SFA and Profunda Endarterectomy and Thrombectomy doing well\par \par Medical Conservative Management paint incision w/ betadine daily, continue AC\par Continue Eliquis 5mg BID- Confirmed w/ Macie RN and [facility]\par RTO in 4-6 weeks for f/u and JOSÉ MIGUEL/PVRs

## 2019-03-25 NOTE — HISTORY OF PRESENT ILLNESS
[FreeTextEntry1] : 89 y/o m w/ history of AFib on Eliquis which was discontinued for bladder cancer and subsequently developed left leg acute ischemia. He is now s/p a left CFA, SFA and Profunda thrombectomy and endarterectomy w/ vein patch on 3/9/19. He denies any pain today. Accompanied by wife and currently in facility.

## 2019-03-25 NOTE — PHYSICAL EXAM
[2+] : left 2+ [1+] : right 1+ [0] : left 0 [] : bilaterally [Alert] : alert [Oriented to Person] : oriented to person [Oriented to Place] : oriented to place [Calm] : calm [Ankle Swelling (On Exam)] : not present [Varicose Veins Of Lower Extremities] : not present [de-identified] : in NAD  [FreeTextEntry1] : Bilateral lower extremities appear warm and well perfused. Left groin incision healing well w/ no s/s of infection. Staples are dry and intact.  [de-identified] : Soft, non tender  [de-identified] : WDL appropriate for age. Arrived in wheelchair [de-identified] : cooperative

## 2019-03-26 ENCOUNTER — APPOINTMENT (OUTPATIENT)
Dept: UROLOGY | Facility: CLINIC | Age: 84
End: 2019-03-26
Payer: MEDICARE

## 2019-03-26 PROCEDURE — 99213 OFFICE O/P EST LOW 20 MIN: CPT

## 2019-04-18 ENCOUNTER — APPOINTMENT (OUTPATIENT)
Dept: UROLOGY | Facility: CLINIC | Age: 84
End: 2019-04-18
Payer: MEDICARE

## 2019-04-18 ENCOUNTER — OUTPATIENT (OUTPATIENT)
Dept: OUTPATIENT SERVICES | Facility: HOSPITAL | Age: 84
LOS: 1 days | End: 2019-04-18
Payer: COMMERCIAL

## 2019-04-18 VITALS
HEART RATE: 85 BPM | BODY MASS INDEX: 25.11 KG/M2 | SYSTOLIC BLOOD PRESSURE: 143 MMHG | DIASTOLIC BLOOD PRESSURE: 73 MMHG | HEIGHT: 67 IN | TEMPERATURE: 97.4 F | WEIGHT: 160 LBS | RESPIRATION RATE: 16 BRPM | OXYGEN SATURATION: 99 %

## 2019-04-18 DIAGNOSIS — Q15.9 CONGENITAL MALFORMATION OF EYE, UNSPECIFIED: Chronic | ICD-10-CM

## 2019-04-18 DIAGNOSIS — Z98.89 OTHER SPECIFIED POSTPROCEDURAL STATES: Chronic | ICD-10-CM

## 2019-04-18 DIAGNOSIS — R52 PAIN, UNSPECIFIED: Chronic | ICD-10-CM

## 2019-04-18 DIAGNOSIS — D49.4 NEOPLASM OF UNSPECIFIED BEHAVIOR OF BLADDER: Chronic | ICD-10-CM

## 2019-04-18 DIAGNOSIS — R35.0 FREQUENCY OF MICTURITION: ICD-10-CM

## 2019-04-18 DIAGNOSIS — Z95.0 PRESENCE OF CARDIAC PACEMAKER: Chronic | ICD-10-CM

## 2019-04-18 PROCEDURE — 51720 TREATMENT OF BLADDER LESION: CPT

## 2019-04-18 RX ORDER — GEMCITABINE HYDROCHLORIDE 1 G/1
1 INJECTION, POWDER, LYOPHILIZED, FOR SOLUTION INTRAVENOUS
Qty: 1 | Refills: 0 | Status: COMPLETED | OUTPATIENT
Start: 2019-04-18

## 2019-04-18 RX ORDER — GEMCITABINE 38 MG/ML
1 INJECTION, SOLUTION INTRAVENOUS ONCE
Qty: 0 | Refills: 0 | Status: DISCONTINUED | OUTPATIENT
Start: 2019-04-18 | End: 2019-05-03

## 2019-04-18 RX ORDER — GEMCITABINE HYDROCHLORIDE 1 G/1
1 INJECTION, POWDER, LYOPHILIZED, FOR SOLUTION INTRAVENOUS
Qty: 1 | Refills: 0 | Status: COMPLETED | OUTPATIENT
Start: 2019-04-18 | End: 2019-04-18

## 2019-04-18 RX ADMIN — GEMCITABINE 0 GM: 1 INJECTION, POWDER, LYOPHILIZED, FOR SOLUTION INTRAVENOUS at 00:00

## 2019-04-25 DIAGNOSIS — C67.9 MALIGNANT NEOPLASM OF BLADDER, UNSPECIFIED: ICD-10-CM

## 2019-05-06 ENCOUNTER — APPOINTMENT (OUTPATIENT)
Dept: VASCULAR SURGERY | Facility: CLINIC | Age: 84
End: 2019-05-06

## 2019-05-06 RX ORDER — CIPROFLOXACIN HYDROCHLORIDE 250 MG/1
250 TABLET, FILM COATED ORAL
Qty: 6 | Refills: 0 | Status: DISCONTINUED | COMMUNITY
Start: 2018-11-02 | End: 2019-05-06

## 2019-05-06 RX ADMIN — HYOSCYAMINE SULFATE 0 MG: 0.12 TABLET ORAL at 00:00

## 2019-05-07 ENCOUNTER — APPOINTMENT (OUTPATIENT)
Dept: UROLOGY | Facility: CLINIC | Age: 84
End: 2019-05-07
Payer: MEDICARE

## 2019-05-07 ENCOUNTER — APPOINTMENT (OUTPATIENT)
Dept: UROLOGY | Facility: CLINIC | Age: 84
End: 2019-05-07

## 2019-05-07 ENCOUNTER — OUTPATIENT (OUTPATIENT)
Dept: OUTPATIENT SERVICES | Facility: HOSPITAL | Age: 84
LOS: 1 days | End: 2019-05-07
Payer: COMMERCIAL

## 2019-05-07 VITALS
HEIGHT: 67 IN | HEART RATE: 80 BPM | BODY MASS INDEX: 25.11 KG/M2 | DIASTOLIC BLOOD PRESSURE: 80 MMHG | SYSTOLIC BLOOD PRESSURE: 125 MMHG | TEMPERATURE: 97.6 F | WEIGHT: 160 LBS

## 2019-05-07 DIAGNOSIS — Z95.0 PRESENCE OF CARDIAC PACEMAKER: Chronic | ICD-10-CM

## 2019-05-07 DIAGNOSIS — Q15.9 CONGENITAL MALFORMATION OF EYE, UNSPECIFIED: Chronic | ICD-10-CM

## 2019-05-07 DIAGNOSIS — D49.4 NEOPLASM OF UNSPECIFIED BEHAVIOR OF BLADDER: Chronic | ICD-10-CM

## 2019-05-07 DIAGNOSIS — R35.0 FREQUENCY OF MICTURITION: ICD-10-CM

## 2019-05-07 DIAGNOSIS — N32.89 OTHER SPECIFIED DISORDERS OF BLADDER: ICD-10-CM

## 2019-05-07 DIAGNOSIS — R52 PAIN, UNSPECIFIED: Chronic | ICD-10-CM

## 2019-05-07 DIAGNOSIS — Z98.89 OTHER SPECIFIED POSTPROCEDURAL STATES: Chronic | ICD-10-CM

## 2019-05-07 PROCEDURE — 51720 TREATMENT OF BLADDER LESION: CPT

## 2019-05-07 RX ORDER — GEMCITABINE HYDROCHLORIDE 1 G/1
1 INJECTION, POWDER, LYOPHILIZED, FOR SOLUTION INTRAVENOUS
Qty: 1 | Refills: 0 | Status: COMPLETED | OUTPATIENT
Start: 2019-05-07

## 2019-05-07 RX ORDER — HYOSCYAMINE SULFATE 0.12 MG/1
0.12 TABLET SUBLINGUAL
Refills: 0 | Status: COMPLETED | OUTPATIENT
Start: 2019-05-07

## 2019-05-07 RX ORDER — GEMCITABINE 38 MG/ML
1 INJECTION, SOLUTION INTRAVENOUS ONCE
Qty: 0 | Refills: 0 | Status: DISCONTINUED | OUTPATIENT
Start: 2019-05-07 | End: 2019-05-22

## 2019-05-07 RX ORDER — GEMCITABINE HYDROCHLORIDE 1 G/1
1 INJECTION, POWDER, LYOPHILIZED, FOR SOLUTION INTRAVENOUS
Qty: 1 | Refills: 0 | Status: COMPLETED | OUTPATIENT
Start: 2019-05-06 | End: 2019-05-07

## 2019-05-07 RX ADMIN — GEMCITABINE 0 GM: 1 INJECTION, POWDER, LYOPHILIZED, FOR SOLUTION INTRAVENOUS at 00:00

## 2019-05-14 ENCOUNTER — APPOINTMENT (OUTPATIENT)
Dept: UROLOGY | Facility: CLINIC | Age: 84
End: 2019-05-14
Payer: MEDICARE

## 2019-05-14 ENCOUNTER — OUTPATIENT (OUTPATIENT)
Dept: OUTPATIENT SERVICES | Facility: HOSPITAL | Age: 84
LOS: 1 days | End: 2019-05-14
Payer: COMMERCIAL

## 2019-05-14 VITALS
TEMPERATURE: 97.1 F | RESPIRATION RATE: 16 BRPM | DIASTOLIC BLOOD PRESSURE: 85 MMHG | SYSTOLIC BLOOD PRESSURE: 140 MMHG | HEART RATE: 89 BPM

## 2019-05-14 DIAGNOSIS — D49.4 NEOPLASM OF UNSPECIFIED BEHAVIOR OF BLADDER: Chronic | ICD-10-CM

## 2019-05-14 DIAGNOSIS — Q15.9 CONGENITAL MALFORMATION OF EYE, UNSPECIFIED: Chronic | ICD-10-CM

## 2019-05-14 DIAGNOSIS — R35.0 FREQUENCY OF MICTURITION: ICD-10-CM

## 2019-05-14 DIAGNOSIS — R52 PAIN, UNSPECIFIED: Chronic | ICD-10-CM

## 2019-05-14 DIAGNOSIS — Z95.0 PRESENCE OF CARDIAC PACEMAKER: Chronic | ICD-10-CM

## 2019-05-14 DIAGNOSIS — Z98.89 OTHER SPECIFIED POSTPROCEDURAL STATES: Chronic | ICD-10-CM

## 2019-05-14 DIAGNOSIS — C67.9 MALIGNANT NEOPLASM OF BLADDER, UNSPECIFIED: ICD-10-CM

## 2019-05-14 PROCEDURE — 51720 TREATMENT OF BLADDER LESION: CPT

## 2019-05-14 RX ORDER — GEMCITABINE HYDROCHLORIDE 1 G/1
1 INJECTION, POWDER, LYOPHILIZED, FOR SOLUTION INTRAVENOUS
Qty: 1 | Refills: 0 | Status: COMPLETED | OUTPATIENT
Start: 2019-05-14

## 2019-05-14 RX ORDER — GEMCITABINE HYDROCHLORIDE 1 G/1
1 INJECTION, POWDER, LYOPHILIZED, FOR SOLUTION INTRAVENOUS
Qty: 1 | Refills: 0 | Status: COMPLETED | OUTPATIENT
Start: 2019-05-14 | End: 2019-05-14

## 2019-05-14 RX ORDER — HYOSCYAMINE SULFATE 0.12 MG/1
0.12 TABLET SUBLINGUAL
Refills: 0 | Status: COMPLETED | OUTPATIENT
Start: 2019-05-14

## 2019-05-14 RX ORDER — GEMCITABINE 38 MG/ML
1 INJECTION, SOLUTION INTRAVENOUS ONCE
Refills: 0 | Status: DISCONTINUED | OUTPATIENT
Start: 2019-05-14 | End: 2019-05-29

## 2019-05-14 RX ADMIN — GEMCITABINE 0 GM: 1 INJECTION, POWDER, LYOPHILIZED, FOR SOLUTION INTRAVENOUS at 00:00

## 2019-05-14 RX ADMIN — HYOSCYAMINE SULFATE 0 MG: 0.12 TABLET ORAL at 00:00

## 2019-05-21 ENCOUNTER — OUTPATIENT (OUTPATIENT)
Dept: OUTPATIENT SERVICES | Facility: HOSPITAL | Age: 84
LOS: 1 days | End: 2019-05-21
Payer: COMMERCIAL

## 2019-05-21 ENCOUNTER — APPOINTMENT (OUTPATIENT)
Dept: UROLOGY | Facility: CLINIC | Age: 84
End: 2019-05-21
Payer: MEDICARE

## 2019-05-21 VITALS
DIASTOLIC BLOOD PRESSURE: 79 MMHG | HEART RATE: 87 BPM | RESPIRATION RATE: 16 BRPM | SYSTOLIC BLOOD PRESSURE: 132 MMHG | TEMPERATURE: 97.9 F

## 2019-05-21 DIAGNOSIS — R35.0 FREQUENCY OF MICTURITION: ICD-10-CM

## 2019-05-21 DIAGNOSIS — D49.4 NEOPLASM OF UNSPECIFIED BEHAVIOR OF BLADDER: Chronic | ICD-10-CM

## 2019-05-21 DIAGNOSIS — R52 PAIN, UNSPECIFIED: Chronic | ICD-10-CM

## 2019-05-21 DIAGNOSIS — Z95.0 PRESENCE OF CARDIAC PACEMAKER: Chronic | ICD-10-CM

## 2019-05-21 DIAGNOSIS — Q15.9 CONGENITAL MALFORMATION OF EYE, UNSPECIFIED: Chronic | ICD-10-CM

## 2019-05-21 DIAGNOSIS — Z98.89 OTHER SPECIFIED POSTPROCEDURAL STATES: Chronic | ICD-10-CM

## 2019-05-21 DIAGNOSIS — C67.9 MALIGNANT NEOPLASM OF BLADDER, UNSPECIFIED: ICD-10-CM

## 2019-05-21 PROCEDURE — 51720 TREATMENT OF BLADDER LESION: CPT

## 2019-05-21 RX ORDER — HYOSCYAMINE SULFATE 0.12 MG/1
0.12 TABLET SUBLINGUAL
Refills: 0 | Status: COMPLETED | OUTPATIENT
Start: 2019-05-21

## 2019-05-21 RX ORDER — GEMCITABINE HYDROCHLORIDE 1 G/1
1 INJECTION, POWDER, LYOPHILIZED, FOR SOLUTION INTRAVENOUS
Qty: 1 | Refills: 0 | Status: COMPLETED | OUTPATIENT
Start: 2019-05-21 | End: 2019-05-21

## 2019-05-21 RX ORDER — GEMCITABINE HYDROCHLORIDE 1 G/1
1 INJECTION, POWDER, LYOPHILIZED, FOR SOLUTION INTRAVENOUS
Qty: 1 | Refills: 0 | Status: COMPLETED | OUTPATIENT
Start: 2019-05-21

## 2019-05-21 RX ORDER — GEMCITABINE 38 MG/ML
1 INJECTION, SOLUTION INTRAVENOUS ONCE
Refills: 0 | Status: DISCONTINUED | OUTPATIENT
Start: 2019-05-21 | End: 2019-06-05

## 2019-05-21 RX ADMIN — GEMCITABINE 0 GM: 1 INJECTION, POWDER, LYOPHILIZED, FOR SOLUTION INTRAVENOUS at 00:00

## 2019-05-21 RX ADMIN — HYOSCYAMINE SULFATE 0 MG: 0.12 TABLET ORAL at 00:00

## 2019-05-28 ENCOUNTER — APPOINTMENT (OUTPATIENT)
Dept: UROLOGY | Facility: CLINIC | Age: 84
End: 2019-05-28
Payer: MEDICARE

## 2019-05-28 ENCOUNTER — OUTPATIENT (OUTPATIENT)
Dept: OUTPATIENT SERVICES | Facility: HOSPITAL | Age: 84
LOS: 1 days | End: 2019-05-28
Payer: COMMERCIAL

## 2019-05-28 VITALS
TEMPERATURE: 98.3 F | BODY MASS INDEX: 25.11 KG/M2 | RESPIRATION RATE: 17 BRPM | HEART RATE: 83 BPM | OXYGEN SATURATION: 99 % | DIASTOLIC BLOOD PRESSURE: 83 MMHG | HEIGHT: 67 IN | SYSTOLIC BLOOD PRESSURE: 130 MMHG | WEIGHT: 160 LBS

## 2019-05-28 DIAGNOSIS — D49.4 NEOPLASM OF UNSPECIFIED BEHAVIOR OF BLADDER: Chronic | ICD-10-CM

## 2019-05-28 DIAGNOSIS — Z95.0 PRESENCE OF CARDIAC PACEMAKER: Chronic | ICD-10-CM

## 2019-05-28 DIAGNOSIS — Z98.89 OTHER SPECIFIED POSTPROCEDURAL STATES: Chronic | ICD-10-CM

## 2019-05-28 DIAGNOSIS — R52 PAIN, UNSPECIFIED: Chronic | ICD-10-CM

## 2019-05-28 DIAGNOSIS — R35.0 FREQUENCY OF MICTURITION: ICD-10-CM

## 2019-05-28 DIAGNOSIS — Q15.9 CONGENITAL MALFORMATION OF EYE, UNSPECIFIED: Chronic | ICD-10-CM

## 2019-05-28 PROCEDURE — 51720 TREATMENT OF BLADDER LESION: CPT

## 2019-05-28 RX ORDER — GEMCITABINE 38 MG/ML
1 INJECTION, SOLUTION INTRAVENOUS ONCE
Refills: 0 | Status: DISCONTINUED | OUTPATIENT
Start: 2019-05-28 | End: 2019-06-12

## 2019-05-28 RX ORDER — HYOSCYAMINE SULFATE 0.12 MG/1
0.12 TABLET SUBLINGUAL
Refills: 0 | Status: COMPLETED | OUTPATIENT
Start: 2019-05-28

## 2019-05-28 RX ORDER — GEMCITABINE HYDROCHLORIDE 1 G/26.3ML
1 INJECTION INTRAVENOUS
Refills: 0 | Status: COMPLETED | OUTPATIENT
Start: 2019-05-28

## 2019-05-28 RX ORDER — GEMCITABINE HYDROCHLORIDE 1 G/1
1 INJECTION, POWDER, LYOPHILIZED, FOR SOLUTION INTRAVENOUS
Qty: 1 | Refills: 0 | Status: COMPLETED | OUTPATIENT
Start: 2019-05-28 | End: 2019-05-28

## 2019-05-28 RX ADMIN — GEMCITABINE 0 GM/26.3ML: 1 INJECTION, POWDER, LYOPHILIZED, FOR SOLUTION INTRAVENOUS at 00:00

## 2019-05-28 RX ADMIN — HYOSCYAMINE SULFATE 0 MG: 0.12 TABLET ORAL at 00:00

## 2019-05-29 DIAGNOSIS — C67.9 MALIGNANT NEOPLASM OF BLADDER, UNSPECIFIED: ICD-10-CM

## 2019-06-04 ENCOUNTER — APPOINTMENT (OUTPATIENT)
Dept: UROLOGY | Facility: CLINIC | Age: 84
End: 2019-06-04
Payer: MEDICARE

## 2019-06-04 ENCOUNTER — OUTPATIENT (OUTPATIENT)
Dept: OUTPATIENT SERVICES | Facility: HOSPITAL | Age: 84
LOS: 1 days | End: 2019-06-04
Payer: COMMERCIAL

## 2019-06-04 VITALS
WEIGHT: 160 LBS | OXYGEN SATURATION: 100 % | SYSTOLIC BLOOD PRESSURE: 119 MMHG | HEIGHT: 67 IN | RESPIRATION RATE: 17 BRPM | HEART RATE: 82 BPM | TEMPERATURE: 95.4 F | BODY MASS INDEX: 25.11 KG/M2 | DIASTOLIC BLOOD PRESSURE: 68 MMHG

## 2019-06-04 DIAGNOSIS — D49.4 NEOPLASM OF UNSPECIFIED BEHAVIOR OF BLADDER: Chronic | ICD-10-CM

## 2019-06-04 DIAGNOSIS — R52 PAIN, UNSPECIFIED: Chronic | ICD-10-CM

## 2019-06-04 DIAGNOSIS — C67.9 MALIGNANT NEOPLASM OF BLADDER, UNSPECIFIED: ICD-10-CM

## 2019-06-04 DIAGNOSIS — R35.0 FREQUENCY OF MICTURITION: ICD-10-CM

## 2019-06-04 DIAGNOSIS — Z95.0 PRESENCE OF CARDIAC PACEMAKER: Chronic | ICD-10-CM

## 2019-06-04 DIAGNOSIS — Z98.89 OTHER SPECIFIED POSTPROCEDURAL STATES: Chronic | ICD-10-CM

## 2019-06-04 DIAGNOSIS — Q15.9 CONGENITAL MALFORMATION OF EYE, UNSPECIFIED: Chronic | ICD-10-CM

## 2019-06-04 PROCEDURE — 51720 TREATMENT OF BLADDER LESION: CPT

## 2019-06-04 RX ORDER — GEMCITABINE 38 MG/ML
1 INJECTION, SOLUTION INTRAVENOUS ONCE
Refills: 0 | Status: DISCONTINUED | OUTPATIENT
Start: 2019-06-04 | End: 2019-06-19

## 2019-06-04 RX ORDER — GEMCITABINE HYDROCHLORIDE 1 G/1
1 INJECTION, POWDER, LYOPHILIZED, FOR SOLUTION INTRAVENOUS
Qty: 1 | Refills: 0 | Status: COMPLETED | OUTPATIENT
Start: 2019-06-04 | End: 2019-06-04

## 2019-06-04 RX ORDER — GEMCITABINE HYDROCHLORIDE 1 G/1
1 INJECTION, POWDER, LYOPHILIZED, FOR SOLUTION INTRAVENOUS
Qty: 1 | Refills: 0 | Status: COMPLETED | OUTPATIENT
Start: 2019-06-04

## 2019-06-04 RX ORDER — HYOSCYAMINE SULFATE 0.12 MG/1
0.12 TABLET SUBLINGUAL
Refills: 0 | Status: COMPLETED | OUTPATIENT
Start: 2019-06-04

## 2019-06-04 RX ADMIN — GEMCITABINE 0 GM: 1 INJECTION, POWDER, LYOPHILIZED, FOR SOLUTION INTRAVENOUS at 00:00

## 2019-06-04 RX ADMIN — HYOSCYAMINE SULFATE 0 MG: 0.12 TABLET ORAL at 00:00

## 2019-06-11 ENCOUNTER — APPOINTMENT (OUTPATIENT)
Dept: UROLOGY | Facility: CLINIC | Age: 84
End: 2019-06-11

## 2019-06-11 DIAGNOSIS — C67.9 MALIGNANT NEOPLASM OF BLADDER, UNSPECIFIED: ICD-10-CM

## 2019-06-12 ENCOUNTER — INPATIENT (INPATIENT)
Facility: HOSPITAL | Age: 84
LOS: 6 days | Discharge: NURSING, REHAB OR LONG-TERM CARE | End: 2019-06-19
Attending: HOSPITALIST | Admitting: HOSPITALIST
Payer: MEDICARE

## 2019-06-12 VITALS
RESPIRATION RATE: 18 BRPM | DIASTOLIC BLOOD PRESSURE: 54 MMHG | TEMPERATURE: 98 F | SYSTOLIC BLOOD PRESSURE: 115 MMHG | OXYGEN SATURATION: 99 % | HEART RATE: 60 BPM

## 2019-06-12 DIAGNOSIS — R52 PAIN, UNSPECIFIED: Chronic | ICD-10-CM

## 2019-06-12 DIAGNOSIS — Z95.0 PRESENCE OF CARDIAC PACEMAKER: Chronic | ICD-10-CM

## 2019-06-12 DIAGNOSIS — Z98.89 OTHER SPECIFIED POSTPROCEDURAL STATES: Chronic | ICD-10-CM

## 2019-06-12 DIAGNOSIS — Q15.9 CONGENITAL MALFORMATION OF EYE, UNSPECIFIED: Chronic | ICD-10-CM

## 2019-06-12 DIAGNOSIS — D49.4 NEOPLASM OF UNSPECIFIED BEHAVIOR OF BLADDER: Chronic | ICD-10-CM

## 2019-06-12 DIAGNOSIS — R41.82 ALTERED MENTAL STATUS, UNSPECIFIED: ICD-10-CM

## 2019-06-12 LAB
ALBUMIN SERPL ELPH-MCNC: 2.8 G/DL — LOW (ref 3.3–5)
ALP SERPL-CCNC: 95 U/L — SIGNIFICANT CHANGE UP (ref 40–120)
ALT FLD-CCNC: 16 U/L — SIGNIFICANT CHANGE UP (ref 4–41)
ANION GAP SERPL CALC-SCNC: 11 MMO/L — SIGNIFICANT CHANGE UP (ref 7–14)
APPEARANCE UR: CLEAR — SIGNIFICANT CHANGE UP
AST SERPL-CCNC: 35 U/L — SIGNIFICANT CHANGE UP (ref 4–40)
BACTERIA # UR AUTO: NEGATIVE — SIGNIFICANT CHANGE UP
BASE EXCESS BLDV CALC-SCNC: 8 MMOL/L — SIGNIFICANT CHANGE UP
BASOPHILS # BLD AUTO: 0.01 K/UL — SIGNIFICANT CHANGE UP (ref 0–0.2)
BASOPHILS NFR BLD AUTO: 0.1 % — SIGNIFICANT CHANGE UP (ref 0–2)
BILIRUB SERPL-MCNC: 0.5 MG/DL — SIGNIFICANT CHANGE UP (ref 0.2–1.2)
BILIRUB UR-MCNC: NEGATIVE — SIGNIFICANT CHANGE UP
BLOOD GAS VENOUS - CREATININE: 1.2 MG/DL — SIGNIFICANT CHANGE UP (ref 0.5–1.3)
BLOOD UR QL VISUAL: SIGNIFICANT CHANGE UP
BUN SERPL-MCNC: 17 MG/DL — SIGNIFICANT CHANGE UP (ref 7–23)
CALCIUM SERPL-MCNC: 9.2 MG/DL — SIGNIFICANT CHANGE UP (ref 8.4–10.5)
CHLORIDE BLDV-SCNC: 107 MMOL/L — SIGNIFICANT CHANGE UP (ref 96–108)
CHLORIDE SERPL-SCNC: 101 MMOL/L — SIGNIFICANT CHANGE UP (ref 98–107)
CK MB BLD-MCNC: 0.5 — SIGNIFICANT CHANGE UP (ref 0–2.5)
CK MB BLD-MCNC: 1.44 NG/ML — SIGNIFICANT CHANGE UP (ref 1–6.6)
CK SERPL-CCNC: 274 U/L — HIGH (ref 30–200)
CO2 SERPL-SCNC: 28 MMOL/L — SIGNIFICANT CHANGE UP (ref 22–31)
COLOR SPEC: YELLOW — SIGNIFICANT CHANGE UP
CREAT SERPL-MCNC: 1.04 MG/DL — SIGNIFICANT CHANGE UP (ref 0.5–1.3)
EOSINOPHIL # BLD AUTO: 0.06 K/UL — SIGNIFICANT CHANGE UP (ref 0–0.5)
EOSINOPHIL NFR BLD AUTO: 0.6 % — SIGNIFICANT CHANGE UP (ref 0–6)
GAS PNL BLDV: 140 MMOL/L — SIGNIFICANT CHANGE UP (ref 136–146)
GLUCOSE BLDV-MCNC: 109 MG/DL — HIGH (ref 70–99)
GLUCOSE SERPL-MCNC: 112 MG/DL — HIGH (ref 70–99)
GLUCOSE UR-MCNC: NEGATIVE — SIGNIFICANT CHANGE UP
HCO3 BLDV-SCNC: 29 MMOL/L — HIGH (ref 20–27)
HCT VFR BLD CALC: 33.8 % — LOW (ref 39–50)
HCT VFR BLDV CALC: 33.6 % — LOW (ref 39–51)
HGB BLD-MCNC: 10.2 G/DL — LOW (ref 13–17)
HGB BLDV-MCNC: 10.9 G/DL — LOW (ref 13–17)
HYALINE CASTS # UR AUTO: SIGNIFICANT CHANGE UP
IMM GRANULOCYTES NFR BLD AUTO: 0.3 % — SIGNIFICANT CHANGE UP (ref 0–1.5)
KETONES UR-MCNC: NEGATIVE — SIGNIFICANT CHANGE UP
LACTATE BLDV-MCNC: 1 MMOL/L — SIGNIFICANT CHANGE UP (ref 0.5–2)
LEUKOCYTE ESTERASE UR-ACNC: NEGATIVE — SIGNIFICANT CHANGE UP
LYMPHOCYTES # BLD AUTO: 0.79 K/UL — LOW (ref 1–3.3)
LYMPHOCYTES # BLD AUTO: 8.3 % — LOW (ref 13–44)
MCHC RBC-ENTMCNC: 26.8 PG — LOW (ref 27–34)
MCHC RBC-ENTMCNC: 30.2 % — LOW (ref 32–36)
MCV RBC AUTO: 88.7 FL — SIGNIFICANT CHANGE UP (ref 80–100)
MONOCYTES # BLD AUTO: 0.98 K/UL — HIGH (ref 0–0.9)
MONOCYTES NFR BLD AUTO: 10.3 % — SIGNIFICANT CHANGE UP (ref 2–14)
NEUTROPHILS # BLD AUTO: 7.61 K/UL — HIGH (ref 1.8–7.4)
NEUTROPHILS NFR BLD AUTO: 80.4 % — HIGH (ref 43–77)
NITRITE UR-MCNC: NEGATIVE — SIGNIFICANT CHANGE UP
NRBC # FLD: 0 K/UL — SIGNIFICANT CHANGE UP (ref 0–0)
PCO2 BLDV: 53 MMHG — HIGH (ref 41–51)
PH BLDV: 7.41 PH — SIGNIFICANT CHANGE UP (ref 7.32–7.43)
PH UR: 6 — SIGNIFICANT CHANGE UP (ref 5–8)
PLATELET # BLD AUTO: 207 K/UL — SIGNIFICANT CHANGE UP (ref 150–400)
PMV BLD: 12.1 FL — SIGNIFICANT CHANGE UP (ref 7–13)
PO2 BLDV: 13 MMHG — LOW (ref 35–40)
POTASSIUM BLDV-SCNC: 4.6 MMOL/L — HIGH (ref 3.4–4.5)
POTASSIUM SERPL-MCNC: 5.3 MMOL/L — SIGNIFICANT CHANGE UP (ref 3.5–5.3)
POTASSIUM SERPL-SCNC: 5.3 MMOL/L — SIGNIFICANT CHANGE UP (ref 3.5–5.3)
PROT SERPL-MCNC: 7.8 G/DL — SIGNIFICANT CHANGE UP (ref 6–8.3)
PROT UR-MCNC: 50 — SIGNIFICANT CHANGE UP
RBC # BLD: 3.81 M/UL — LOW (ref 4.2–5.8)
RBC # FLD: 15.8 % — HIGH (ref 10.3–14.5)
RBC CASTS # UR COMP ASSIST: HIGH (ref 0–?)
SAO2 % BLDV: 10.6 % — LOW (ref 60–85)
SODIUM SERPL-SCNC: 140 MMOL/L — SIGNIFICANT CHANGE UP (ref 135–145)
SP GR SPEC: 1.02 — SIGNIFICANT CHANGE UP (ref 1–1.04)
SQUAMOUS # UR AUTO: SIGNIFICANT CHANGE UP
TROPONIN T, HIGH SENSITIVITY: 48 NG/L — SIGNIFICANT CHANGE UP (ref ?–14)
UROBILINOGEN FLD QL: HIGH
WBC # BLD: 9.48 K/UL — SIGNIFICANT CHANGE UP (ref 3.8–10.5)
WBC # FLD AUTO: 9.48 K/UL — SIGNIFICANT CHANGE UP (ref 3.8–10.5)
WBC UR QL: SIGNIFICANT CHANGE UP (ref 0–?)

## 2019-06-12 PROCEDURE — 71045 X-RAY EXAM CHEST 1 VIEW: CPT | Mod: 26

## 2019-06-12 PROCEDURE — 70450 CT HEAD/BRAIN W/O DYE: CPT | Mod: 26

## 2019-06-12 RX ORDER — SODIUM CHLORIDE 9 MG/ML
500 INJECTION INTRAMUSCULAR; INTRAVENOUS; SUBCUTANEOUS ONCE
Refills: 0 | Status: COMPLETED | OUTPATIENT
Start: 2019-06-12 | End: 2019-06-12

## 2019-06-12 RX ADMIN — SODIUM CHLORIDE 500 MILLILITER(S): 9 INJECTION INTRAMUSCULAR; INTRAVENOUS; SUBCUTANEOUS at 21:23

## 2019-06-12 NOTE — ED PROVIDER NOTE - PSH
Bladder tumor  cysto, TURBT  Eye abnormality  post surgery had ptosis left eye (ptosis NOT from CVA)  History of permanent cardiac pacemaker placement  Medtronic (advisa) placed 10-16-15; model # A3SR01; serial number [id number]  Pain  penile prosthesis removed due to erosion  S/P TURP

## 2019-06-12 NOTE — ED ADULT NURSE NOTE - ED STAT RN HANDOFF DETAILS
Report given to [facility] PUSHPA Barros, pt laying in stretcher, resps even and unlabored b/l. NAD at this time. Awaiting bed assignment. [facility]

## 2019-06-12 NOTE — ED PROVIDER NOTE - CLINICAL SUMMARY MEDICAL DECISION MAKING FREE TEXT BOX
89 M with mild dementia, with change in mental status and diff ambulating. Exam with R drift. Check labs, ct head, infectious w/u likely admit Al[place] att: 89 M with mild dementia, with change in mental status and diff ambulating. Exam with R drift. Check labs, ct head, infectious w/u likely admit

## 2019-06-12 NOTE — ED PROVIDER NOTE - OBJECTIVE STATEMENT
89 M h/o mild dementia, afib on AC, htn, brought in for change in mental status. Per wife, patient has had dec PO x about ten days. Last few days noticed to be more confused, not aware that he is at home, and having diff ambulating, seems to be off balance and leaning to right. Patient denies any complaints currently. +cough. No fevers. no vomiting.

## 2019-06-12 NOTE — ED ADULT NURSE NOTE - OBJECTIVE STATEMENT
Pt received to room 15 a/o x 1 (name/birthday) with hx of early onset dementia being brought in by wife for AMS x 2 weeks. Pt family states he is increasingly getting confused, visual hallucinations (seeing people/family members not there), difficulty ambulating, and intermittently not recognizing family wife. Pt has hx of bladder cancer with last chemo last week. no facial droop or slurred speech noted. Pt has slight weakness to right side. Respirations even and unlabored. Lung sounds clear with equal chest rise bilaterally. ABD is soft, non tender, non distended with normal active bowel sounds. Wife states he has been having foul odours urine. No complaints of chest pain, headache, nausea, dizziness, vomiting  SOB, fever, chills verbalized.

## 2019-06-12 NOTE — ED ADULT NURSE REASSESSMENT NOTE - NS ED NURSE REASSESS COMMENT FT1
Pt straight cath'ed for urine as per MD order, 300cc of yellow clear residual urine drained. Pt tolerated procedure well. Resps even and unlabored b/l. UA/UCx sent. Will continue to monitor.

## 2019-06-12 NOTE — ED ADULT NURSE NOTE - NSIMPLEMENTINTERV_GEN_ALL_ED
Implemented All Fall with Harm Risk Interventions:  [place] to call system. Call bell, personal items and telephone within reach. Instruct patient to call for assistance. Room bathroom lighting operational. Non-slip footwear when patient is off stretcher. Physically safe environment: no spills, clutter or unnecessary equipment. Stretcher in lowest position, wheels locked, appropriate side rails in place. Provide visual cue, wrist band, yellow gown, etc. Monitor gait and stability. Monitor for mental status changes and reorient to person, place, and time. Review medications for side effects contributing to fall risk. Reinforce activity limits and safety measures with patient and family. Provide visual clues: red socks.

## 2019-06-12 NOTE — ED ADULT TRIAGE NOTE - CHIEF COMPLAINT QUOTE
BIB EMS from home for not eating x 10 days, increased confusion pt usually walks with a walker but today he was weak   PMH: Dementia, HTN, DM, asthma, Pacemaker, bladder cancer finished chemo last week, CVA (8 years ago)   in triage EKG to be done BIB EMS from home for not eating x 10 days as per wife  increased confusion pt usually walks with a walker but today he was weak   PMH: Dementia, HTN, DM, asthma, Pacemaker, bladder cancer finished chemo last week, CVA (8 years ago)   in triage EKG to be done BIB EMS from home for not eating x 10 days as per wife  increased confusion getting worse over the past 10 days  pt usually walks with a walker but today he was weak   awake and talking in triage   PMH: Dementia, HTN, DM, asthma, Pacemaker, bladder cancer finished chemo last week, CVA (8 years ago)   in triage EKG to be done

## 2019-06-12 NOTE — ED PROVIDER NOTE - PMH
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

## 2019-06-12 NOTE — ED ADULT NURSE NOTE - CHIEF COMPLAINT QUOTE
BIB EMS from home for not eating x 10 days as per wife  increased confusion getting worse over the past 10 days  pt usually walks with a walker but today he was weak   awake and talking in triage   PMH: Dementia, HTN, DM, asthma, Pacemaker, bladder cancer finished chemo last week, CVA (8 years ago)   in triage EKG to be done

## 2019-06-13 DIAGNOSIS — J45.20 MILD INTERMITTENT ASTHMA, UNCOMPLICATED: ICD-10-CM

## 2019-06-13 DIAGNOSIS — F03.90 UNSPECIFIED DEMENTIA WITHOUT BEHAVIORAL DISTURBANCE: ICD-10-CM

## 2019-06-13 DIAGNOSIS — C67.9 MALIGNANT NEOPLASM OF BLADDER, UNSPECIFIED: ICD-10-CM

## 2019-06-13 DIAGNOSIS — I63.9 CEREBRAL INFARCTION, UNSPECIFIED: ICD-10-CM

## 2019-06-13 DIAGNOSIS — I48.91 UNSPECIFIED ATRIAL FIBRILLATION: ICD-10-CM

## 2019-06-13 DIAGNOSIS — I50.30 UNSPECIFIED DIASTOLIC (CONGESTIVE) HEART FAILURE: ICD-10-CM

## 2019-06-13 DIAGNOSIS — K21.9 GASTRO-ESOPHAGEAL REFLUX DISEASE WITHOUT ESOPHAGITIS: ICD-10-CM

## 2019-06-13 DIAGNOSIS — Z29.9 ENCOUNTER FOR PROPHYLACTIC MEASURES, UNSPECIFIED: ICD-10-CM

## 2019-06-13 DIAGNOSIS — N40.0 BENIGN PROSTATIC HYPERPLASIA WITHOUT LOWER URINARY TRACT SYMPTOMS: ICD-10-CM

## 2019-06-13 DIAGNOSIS — R62.7 ADULT FAILURE TO THRIVE: ICD-10-CM

## 2019-06-13 LAB
B PERT DNA SPEC QL NAA+PROBE: NOT DETECTED — SIGNIFICANT CHANGE UP
C PNEUM DNA SPEC QL NAA+PROBE: NOT DETECTED — SIGNIFICANT CHANGE UP
FLUAV H1 2009 PAND RNA SPEC QL NAA+PROBE: NOT DETECTED — SIGNIFICANT CHANGE UP
FLUAV H1 RNA SPEC QL NAA+PROBE: NOT DETECTED — SIGNIFICANT CHANGE UP
FLUAV H3 RNA SPEC QL NAA+PROBE: NOT DETECTED — SIGNIFICANT CHANGE UP
FLUAV SUBTYP SPEC NAA+PROBE: NOT DETECTED — SIGNIFICANT CHANGE UP
FLUBV RNA SPEC QL NAA+PROBE: NOT DETECTED — SIGNIFICANT CHANGE UP
HADV DNA SPEC QL NAA+PROBE: NOT DETECTED — SIGNIFICANT CHANGE UP
HCOV PNL SPEC NAA+PROBE: SIGNIFICANT CHANGE UP
HMPV RNA SPEC QL NAA+PROBE: NOT DETECTED — SIGNIFICANT CHANGE UP
HPIV1 RNA SPEC QL NAA+PROBE: NOT DETECTED — SIGNIFICANT CHANGE UP
HPIV2 RNA SPEC QL NAA+PROBE: NOT DETECTED — SIGNIFICANT CHANGE UP
HPIV3 RNA SPEC QL NAA+PROBE: NOT DETECTED — SIGNIFICANT CHANGE UP
HPIV4 RNA SPEC QL NAA+PROBE: NOT DETECTED — SIGNIFICANT CHANGE UP
RSV RNA SPEC QL NAA+PROBE: NOT DETECTED — SIGNIFICANT CHANGE UP
RV+EV RNA SPEC QL NAA+PROBE: NOT DETECTED — SIGNIFICANT CHANGE UP
TROPONIN T, HIGH SENSITIVITY: 45 NG/L — SIGNIFICANT CHANGE UP (ref ?–14)

## 2019-06-13 PROCEDURE — 99223 1ST HOSP IP/OBS HIGH 75: CPT | Mod: GC

## 2019-06-13 PROCEDURE — 93279 PRGRMG DEV EVAL PM/LDLS PM: CPT | Mod: 26

## 2019-06-13 PROCEDURE — 12345: CPT | Mod: NC

## 2019-06-13 RX ORDER — RAMIPRIL 5 MG
1 CAPSULE ORAL
Qty: 0 | Refills: 0 | DISCHARGE

## 2019-06-13 RX ORDER — ACETAMINOPHEN 500 MG
1 TABLET ORAL
Qty: 0 | Refills: 0 | DISCHARGE

## 2019-06-13 RX ORDER — PANTOPRAZOLE SODIUM 20 MG/1
40 TABLET, DELAYED RELEASE ORAL
Refills: 0 | Status: DISCONTINUED | OUTPATIENT
Start: 2019-06-13 | End: 2019-06-19

## 2019-06-13 RX ORDER — DOCUSATE SODIUM 100 MG
100 CAPSULE ORAL
Refills: 0 | Status: DISCONTINUED | OUTPATIENT
Start: 2019-06-13 | End: 2019-06-19

## 2019-06-13 RX ORDER — APIXABAN 2.5 MG/1
5 TABLET, FILM COATED ORAL EVERY 12 HOURS
Refills: 0 | Status: DISCONTINUED | OUTPATIENT
Start: 2019-06-13 | End: 2019-06-19

## 2019-06-13 RX ORDER — AMIODARONE HYDROCHLORIDE 400 MG/1
100 TABLET ORAL DAILY
Refills: 0 | Status: DISCONTINUED | OUTPATIENT
Start: 2019-06-13 | End: 2019-06-13

## 2019-06-13 RX ORDER — TAMSULOSIN HYDROCHLORIDE 0.4 MG/1
0.4 CAPSULE ORAL AT BEDTIME
Refills: 0 | Status: DISCONTINUED | OUTPATIENT
Start: 2019-06-13 | End: 2019-06-19

## 2019-06-13 RX ORDER — DONEPEZIL HYDROCHLORIDE 10 MG/1
10 TABLET, FILM COATED ORAL AT BEDTIME
Refills: 0 | Status: DISCONTINUED | OUTPATIENT
Start: 2019-06-13 | End: 2019-06-19

## 2019-06-13 RX ORDER — METOPROLOL TARTRATE 50 MG
100 TABLET ORAL
Refills: 0 | Status: DISCONTINUED | OUTPATIENT
Start: 2019-06-13 | End: 2019-06-19

## 2019-06-13 RX ORDER — FOLIC ACID 0.8 MG
1 TABLET ORAL DAILY
Refills: 0 | Status: DISCONTINUED | OUTPATIENT
Start: 2019-06-13 | End: 2019-06-19

## 2019-06-13 RX ORDER — ATORVASTATIN CALCIUM 80 MG/1
80 TABLET, FILM COATED ORAL AT BEDTIME
Refills: 0 | Status: DISCONTINUED | OUTPATIENT
Start: 2019-06-13 | End: 2019-06-19

## 2019-06-13 RX ORDER — AMIODARONE HYDROCHLORIDE 400 MG/1
100 TABLET ORAL DAILY
Refills: 0 | Status: DISCONTINUED | OUTPATIENT
Start: 2019-06-13 | End: 2019-06-19

## 2019-06-13 RX ORDER — MONTELUKAST 4 MG/1
10 TABLET, CHEWABLE ORAL DAILY
Refills: 0 | Status: DISCONTINUED | OUTPATIENT
Start: 2019-06-13 | End: 2019-06-19

## 2019-06-13 RX ADMIN — Medication 100 MILLIGRAM(S): at 05:14

## 2019-06-13 RX ADMIN — APIXABAN 5 MILLIGRAM(S): 2.5 TABLET, FILM COATED ORAL at 18:35

## 2019-06-13 RX ADMIN — Medication 100 MILLIGRAM(S): at 18:35

## 2019-06-13 RX ADMIN — AMIODARONE HYDROCHLORIDE 100 MILLIGRAM(S): 400 TABLET ORAL at 05:13

## 2019-06-13 RX ADMIN — DONEPEZIL HYDROCHLORIDE 10 MILLIGRAM(S): 10 TABLET, FILM COATED ORAL at 21:56

## 2019-06-13 RX ADMIN — Medication 1 MILLIGRAM(S): at 11:50

## 2019-06-13 RX ADMIN — TAMSULOSIN HYDROCHLORIDE 0.4 MILLIGRAM(S): 0.4 CAPSULE ORAL at 21:56

## 2019-06-13 RX ADMIN — ATORVASTATIN CALCIUM 80 MILLIGRAM(S): 80 TABLET, FILM COATED ORAL at 21:56

## 2019-06-13 RX ADMIN — APIXABAN 5 MILLIGRAM(S): 2.5 TABLET, FILM COATED ORAL at 05:13

## 2019-06-13 RX ADMIN — MONTELUKAST 10 MILLIGRAM(S): 4 TABLET, CHEWABLE ORAL at 11:50

## 2019-06-13 RX ADMIN — PANTOPRAZOLE SODIUM 40 MILLIGRAM(S): 20 TABLET, DELAYED RELEASE ORAL at 05:14

## 2019-06-13 NOTE — H&P ADULT - NSHPPHYSICALEXAM_GEN_ALL_CORE
PHYSICAL EXAM:   GEN: Age appropriate, resting comfortably in bed, no acute distress, non toxic appearing, speaking in complete sentences.   HEENT: L eye ptosis  PULM: Lungs CTAB, no wheezes, rales, rhonchi  CV: irregularly irregular RR, no MRG  MSK: no stiffness or joint effusions  Abdominal: mass suprapubic region TTP  Extremities: No edema or cyanosis  NEURO: AAOx2  Psych: normal affect, normal behavior  Skin: No rashes, lesions    T(C): 36.1 (06-13-19 @ 01:37), Max: 36.9 (06-12-19 @ 19:46)  HR: 75 (06-13-19 @ 01:37) (60 - 75)  BP: 131/75 (06-13-19 @ 01:37) (115/54 - 134/71)  RR: 17 (06-13-19 @ 01:37) (17 - 18)  SpO2: 100% (06-13-19 @ 01:37) (98% - 100%) PHYSICAL EXAM:   GEN: Age appropriate, resting comfortably in bed, no acute distress, non toxic appearing, speaking in complete sentences.   HEENT: L eye ptosis, clear conjunctiva, no eye discharge, no nasal discharge  PULM: Lungs CTAB, no wheezes, rales, rhonchi  CV: irregularly irregular RR, no MRG  MSK: no stiffness or joint effusions  Abdominal: mass suprapubic region TTP  Extremities: No edema or cyanosis  NEURO: AAOx2, 5/5 strength b/l, sensation intact to light touch throughout  Psych: normal affect, normal behavior  Skin: No rashes, lesions    T(C): 36.1 (06-13-19 @ 01:37), Max: 36.9 (06-12-19 @ 19:46)  HR: 75 (06-13-19 @ 01:37) (60 - 75)  BP: 131/75 (06-13-19 @ 01:37) (115/54 - 134/71)  RR: 17 (06-13-19 @ 01:37) (17 - 18)  SpO2: 100% (06-13-19 @ 01:37) (98% - 100%)

## 2019-06-13 NOTE — H&P ADULT - NSICDXPASTMEDICALHX_GEN_ALL_CORE_FT
PAST MEDICAL HISTORY:  Alcohol abuse quit in 2012 after first CVA -- had been heavy drinker    Asthma, mild intermittent last use of inhalers greater than 6 months ago    Atrial fibrillation diagnosed approximately 2008,  treated medically, on coumadin    Bladder mass     BPH (benign prostatic hypertrophy)     CVA (cerebral infarction) 9/2012 with visually disturbances    Dementia     Dyslipidemia     Erosion of penile prosthesis     Hearing loss bilateral    Hematuria     HTN (hypertension), benign     Hypertension     Irregular heart beat Pacemaker--Medtronic (advisa) placed 10-16-15; model #A3SR01; serial number [id number]    Memory loss     Murmur, cardiac     Snoring MAYA precautions -- responds affirmatively to STOP BANG questionnaire    UTI (urinary tract infection) as of 2-20-19, patient on cipro

## 2019-06-13 NOTE — ED ADULT NURSE REASSESSMENT NOTE - NS ED NURSE REASSESS COMMENT FT1
pt became agitated and flailing around while trying to start a new IV. MAR made aware and states to hold off on repeat trop until pt is seen.

## 2019-06-13 NOTE — H&P ADULT - HISTORY OF PRESENT ILLNESS
The patient is a 89 year old male with a PMHx of A. 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 The patient was able to tell me his full name and the POTUS however he could not tell me where he was, why he was in the hospital, or what year it was. His wife was at the bedside and she provided additional hx. She reports he has been having difficulty ambulating with his walker recently. She reports that he has been eating and drinking significantly less than usual. He has had a 20 pound unintentional weight loss. The patient is incontinent and uses diapers.     In the ED vital signs: 98.4, 60, 115/54, 18, 98 on RA. The patient received 500cc of .9NS. The patient is a 89 year old male with a PMHx of A. 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 The patient was able to tell me his full name and the POTUS however he could not tell me where he was, why he was in the hospital, or what year it was. His wife was at the bedside and she provided additional hx. She reports he has been having difficulty ambulating with his walker recently. She reports that he has been eating and drinking significantly less than usual. He has had a 20 pound unintentional weight loss. The patient is incontinent and uses diapers.     In the ED vital signs: 98.4, 60, 115/54, 18, 98 on RA. The patient received 500cc of .9NS. The patient is a 89 year old male with a PMHx of A. 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 The patient was able to tell me his full name and the POTUS however he could not tell me where he was, why he was in the hospital, or what year it was. His wife was at the bedside and she provided additional hx. She reports he has been having difficulty ambulating with his walker recently. She reports that he has been eating and drinking significantly less than usual. He has had a 20 pound unintentional weight loss. The patient is incontinent and uses diapers. She denies the patient having any recent illnesses, fevers, or changes in medications.     In the ED vital signs: 98.4, 60, 115/54, 18, 98 on RA. The patient received 500cc of .9NS.

## 2019-06-13 NOTE — H&P ADULT - PROBLEM SELECTOR PLAN 5
-on furosemide every other day. wife does not know dose. will clarify this AM  -euvolemic on exam. CXR clear. HST 45-->43  -will order furosemide once clarified

## 2019-06-13 NOTE — PROGRESS NOTE ADULT - PROBLEM SELECTOR PLAN 10
-DVT prophylaxis with apixaban  -Diet regular DASH TLC  - will reach out to family about baseline MS and GOC wishes. at this time no obvious organic pathology, suspect likely FTT due to dementia and underlying comorbidities which will require family discussion on plan of care and dispo. possible palliative eval

## 2019-06-13 NOTE — H&P ADULT - PROBLEM SELECTOR PLAN 1
-decreased PO intake, weakness, 20 pound weightloss, lack of appetite  -unclear if due to underyling malignancy  -PT consult, NEVAEH estrada, nutrition consult -decreased PO intake, weakness, 20 pound weightloss, lack of appetite  -unclear if due to underyling malignancy vs other cause (though no known illnesses, medication changes, electrolyte changes) vs worsening dementia  -PT consult, NEVAEH estrada, nutrition consult

## 2019-06-13 NOTE — H&P ADULT - ATTENDING COMMENTS
Patient seen and examined on 6/13/19, case discussed with Dr. Avendano, agree with assessment and plan as above.

## 2019-06-13 NOTE — H&P ADULT - ASSESSMENT
The patient is a 89 year old male with a PMHx of A. 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 The patient is a 89 year old male with a PMHx of A. 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

## 2019-06-13 NOTE — H&P ADULT - NSICDXPASTSURGICALHX_GEN_ALL_CORE_FT
PAST SURGICAL HISTORY:  Bladder tumor cysto, TURBT    Eye abnormality post surgery had ptosis left eye (ptosis NOT from CVA)    History of permanent cardiac pacemaker placement Medtronic (advisa) placed 10-16-15; model # A3SR01; serial number [id number]    Pain penile prosthesis removed due to erosion    S/P TURP

## 2019-06-13 NOTE — PATIENT PROFILE ADULT - VISION (WITH CORRECTIVE LENSES IF THE PATIENT USUALLY WEARS THEM):
Pt. has ptosis to the left eye from prior eyelid lift/Partially impaired: cannot see medication labels or newsprint, but can see obstacles in path, and the surrounding layout; can count fingers at arm's length

## 2019-06-13 NOTE — CHART NOTE - NSCHARTNOTEFT_GEN_A_CORE
Writer called EP to interrogate pacemaker. ..information of pacemaker obtained from patient's spouse. ..patient has medtronic, placed on october 16, 2015 with model A3SR01 & [id number]. EP aware - will follow up recs    Thank you,  [facility] NP

## 2019-06-13 NOTE — PROGRESS NOTE ADULT - ATTENDING COMMENTS
addendum - had extensive d/w daughter  pt has been declining over the last 2 weeks in terms of functional status and nutritional intake. I d/w daughter wishes about feeding and placement. she will d/w mom and come to a decision by tanner. I explained that feeding tubes are not rec in this setting and that supportive care is most beneficial as his presentation is most compatible with failure to thrive in advancing dementia. in the meantime will await PPM interrogation, PT and nutrition eval.

## 2019-06-13 NOTE — ED ADULT NURSE REASSESSMENT NOTE - NS ED NURSE REASSESS COMMENT FT1
Pt calm at this time, resps even and unlabored b/l. IVL 22g Angiocath placed on right hand. Rpt trop sent. NAD at this time. Awaiting bed assignment. Will continue to monitor.

## 2019-06-13 NOTE — H&P ADULT - PROBLEM SELECTOR PLAN 3
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 Per uro outpt notes was scheduled for final dose this month.  -Palpable mass in bladder region. Will order bladder scan to ensure patient not retaining urine. 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 Per uro outpt notes was scheduled for final dose this month.  -Palpable mass in bladder region. Will order bladder scan to ensure patient not retaining urine. Consider URO c/s in AM

## 2019-06-13 NOTE — PROCEDURE NOTE - ADDITIONAL PROCEDURE DETAILS
Implanting: Dr. Irving  Date of Implant:  10/2015  Indication: "Permanent AF"               RV Lead: 4076, 10/2015  RV (%) paced: 92.1    Presenting Rhythm: Afib, occasional V pacing    Dependent: No  Events/Observations: None  Parameter Changes: None made    Impression/Plan: Normal device function. Normal sensing and pacing via iterative testing. Normal lead impedance(s) and capture threshold(s).  No events to correlate with presenting symptoms. Discussed findings with EP attending.

## 2019-06-13 NOTE — H&P ADULT - NSHPLABSRESULTS_GEN_ALL_CORE
CBC Full  -  ( 2019 21:25 )  WBC Count : 9.48 K/uL  RBC Count : 3.81 M/uL  Hemoglobin : 10.2 g/dL  Hematocrit : 33.8 %  Platelet Count - Automated : 207 K/uL  Mean Cell Volume : 88.7 fL  Mean Cell Hemoglobin : 26.8 pg  Mean Cell Hemoglobin Concentration : 30.2 %  Auto Neutrophil # : 7.61 K/uL  Auto Lymphocyte # : 0.79 K/uL  Auto Monocyte # : 0.98 K/uL  Auto Eosinophil # : 0.06 K/uL  Auto Basophil # : 0.01 K/uL  Auto Neutrophil % : 80.4 %  Auto Lymphocyte % : 8.3 %  Auto Monocyte % : 10.3 %  Auto Eosinophil % : 0.6 %  Auto Basophil % : 0.1 %    -12    140  |  101  |  17  ----------------------------<  112<H>  5.3   |  28  |  1.04    Ca    9.2      2019 21:25    LIVER FUNCTIONS - ( 2019 21:25 )  Alb: 2.8 g/dL / Pro: 7.8 g/dL / ALK PHOS: 95 u/L / ALT: 16 u/L / AST: 35 u/L / GGT: x           21:25 - VBG - pH: 7.41  | pCO2: 53    | pO2: 13    | Lactate: 1.0      HST 48-->45    Urinalysis Basic - ( 2019 23:03 )    Color: YELLOW / Appearance: CLEAR / S.019 / pH: 6.0  Gluc: NEGATIVE / Ketone: NEGATIVE  / Bili: NEGATIVE / Urobili: SMALL   Blood: SMALL / Protein: 50 / Nitrite: NEGATIVE   Leuk Esterase: NEGATIVE / RBC: 6-10 / WBC 0-2   Sq Epi: FEW / Non Sq Epi: x / Bacteria: NEGATIVE    CTH IMPRESSION:   No acute territorial infarct, hemorrhage or mass effect.    CXR clear lungs    EKG CBC Full  -  ( 2019 21:25 )  WBC Count : 9.48 K/uL  RBC Count : 3.81 M/uL  Hemoglobin : 10.2 g/dL  Hematocrit : 33.8 %  Platelet Count - Automated : 207 K/uL  Mean Cell Volume : 88.7 fL  Mean Cell Hemoglobin : 26.8 pg  Mean Cell Hemoglobin Concentration : 30.2 %  Auto Neutrophil # : 7.61 K/uL  Auto Lymphocyte # : 0.79 K/uL  Auto Monocyte # : 0.98 K/uL  Auto Eosinophil # : 0.06 K/uL  Auto Basophil # : 0.01 K/uL  Auto Neutrophil % : 80.4 %  Auto Lymphocyte % : 8.3 %  Auto Monocyte % : 10.3 %  Auto Eosinophil % : 0.6 %  Auto Basophil % : 0.1 %        140  |  101  |  17  --------------------<  112<H>  5.3   |  28  |  1.04    Ca    9.2      2019 21:25    LIVER FUNCTIONS - ( 2019 21:25 )  Alb: 2.8 g/dL / Pro: 7.8 g/dL / ALK PHOS: 95 u/L / ALT: 16 u/L / AST: 35 u/L / GGT: x           21:25 - VBG - pH: 7.41  | pCO2: 53    | pO2: 13    | Lactate: 1.0      HST 48-->45    Urinalysis Basic - ( 2019 23:03 )  Color: YELLOW / Appearance: CLEAR / S.019 / pH: 6.0  Gluc: NEGATIVE / Ketone: NEGATIVE  / Bili: NEGATIVE / Urobili: SMALL   Blood: SMALL / Protein: 50 / Nitrite: NEGATIVE   Leuk Esterase: NEGATIVE / RBC: 6-10 / WBC 0-2   Sq Epi: FEW / Non Sq Epi: x / Bacteria: NEGATIVE    CTH IMPRESSION:   No acute territorial infarct, hemorrhage or mass effect.    CXR clear lungs    EKG - V-paced rhythm with some intrinsic complexes

## 2019-06-13 NOTE — H&P ADULT - NSHPREVIEWOFSYSTEMS_GEN_ALL_CORE
Constitutional: denies fevers, chills, night sweats, weight loss  HEENT: denies visual changes, cough  Cardiovascular: denies palpitations, chest pain, edema  Respiratory: denies SOB, wheezing  Gastrointestinal: denies N/V/D, abdominal pain, hematochezia, melena  : denies dysuria, urinary urgency, increased frequency  MSK: + muscle weakness, joint pain  Skin: denies new rashes or masses  Heme: denies bleeding, bruising  Neuro: denies headache, +weakness Constitutional: denies fevers, chills, night sweats, weight loss; decreased appetite and weight loss as per wife, increasing generalized weakness as per wife  HEENT: denies visual changes, cough  Cardiovascular: denies palpitations, chest pain, edema  Respiratory: denies SOB, wheezing  Gastrointestinal: denies N/V/D, abdominal pain, hematochezia, melena  : denies dysuria, urinary urgency, increased frequency  MSK: no muscle weakness, joint pain  Skin: denies new rashes or masses  Heme: denies bleeding, bruising  Neuro: denies headache, focal weakness/numbness

## 2019-06-13 NOTE — H&P ADULT - NSICDXFAMILYHX_GEN_ALL_CORE_FT
FAMILY HISTORY:  Family history of myocardial infarction  Family history of type II diabetes mellitus    Sibling  Still living? Unknown  Family history of breast cancer in sister, Age at diagnosis: Age Unknown

## 2019-06-14 ENCOUNTER — TRANSCRIPTION ENCOUNTER (OUTPATIENT)
Age: 84
End: 2019-06-14

## 2019-06-14 LAB
ALBUMIN SERPL ELPH-MCNC: 2.9 G/DL — LOW (ref 3.3–5)
ALP SERPL-CCNC: 86 U/L — SIGNIFICANT CHANGE UP (ref 40–120)
ALT FLD-CCNC: 14 U/L — SIGNIFICANT CHANGE UP (ref 4–41)
ANION GAP SERPL CALC-SCNC: 11 MMO/L — SIGNIFICANT CHANGE UP (ref 7–14)
AST SERPL-CCNC: 16 U/L — SIGNIFICANT CHANGE UP (ref 4–40)
BACTERIA UR CULT: SIGNIFICANT CHANGE UP
BASOPHILS # BLD AUTO: 0.02 K/UL — SIGNIFICANT CHANGE UP (ref 0–0.2)
BASOPHILS NFR BLD AUTO: 0.2 % — SIGNIFICANT CHANGE UP (ref 0–2)
BILIRUB SERPL-MCNC: 0.6 MG/DL — SIGNIFICANT CHANGE UP (ref 0.2–1.2)
BUN SERPL-MCNC: 13 MG/DL — SIGNIFICANT CHANGE UP (ref 7–23)
CALCIUM SERPL-MCNC: 8.7 MG/DL — SIGNIFICANT CHANGE UP (ref 8.4–10.5)
CHLORIDE SERPL-SCNC: 104 MMOL/L — SIGNIFICANT CHANGE UP (ref 98–107)
CO2 SERPL-SCNC: 26 MMOL/L — SIGNIFICANT CHANGE UP (ref 22–31)
CREAT SERPL-MCNC: 0.85 MG/DL — SIGNIFICANT CHANGE UP (ref 0.5–1.3)
EOSINOPHIL # BLD AUTO: 0.07 K/UL — SIGNIFICANT CHANGE UP (ref 0–0.5)
EOSINOPHIL NFR BLD AUTO: 0.7 % — SIGNIFICANT CHANGE UP (ref 0–6)
GLUCOSE SERPL-MCNC: 93 MG/DL — SIGNIFICANT CHANGE UP (ref 70–99)
HCT VFR BLD CALC: 34.4 % — LOW (ref 39–50)
HGB BLD-MCNC: 10.2 G/DL — LOW (ref 13–17)
IMM GRANULOCYTES NFR BLD AUTO: 0.4 % — SIGNIFICANT CHANGE UP (ref 0–1.5)
LYMPHOCYTES # BLD AUTO: 0.93 K/UL — LOW (ref 1–3.3)
LYMPHOCYTES # BLD AUTO: 9 % — LOW (ref 13–44)
MCHC RBC-ENTMCNC: 26.5 PG — LOW (ref 27–34)
MCHC RBC-ENTMCNC: 29.7 % — LOW (ref 32–36)
MCV RBC AUTO: 89.4 FL — SIGNIFICANT CHANGE UP (ref 80–100)
MONOCYTES # BLD AUTO: 1.01 K/UL — HIGH (ref 0–0.9)
MONOCYTES NFR BLD AUTO: 9.7 % — SIGNIFICANT CHANGE UP (ref 2–14)
NEUTROPHILS # BLD AUTO: 8.31 K/UL — HIGH (ref 1.8–7.4)
NEUTROPHILS NFR BLD AUTO: 80 % — HIGH (ref 43–77)
NRBC # FLD: 0 K/UL — SIGNIFICANT CHANGE UP (ref 0–0)
PLATELET # BLD AUTO: 176 K/UL — SIGNIFICANT CHANGE UP (ref 150–400)
PMV BLD: 11.8 FL — SIGNIFICANT CHANGE UP (ref 7–13)
POTASSIUM SERPL-MCNC: 3.6 MMOL/L — SIGNIFICANT CHANGE UP (ref 3.5–5.3)
POTASSIUM SERPL-SCNC: 3.6 MMOL/L — SIGNIFICANT CHANGE UP (ref 3.5–5.3)
PROT SERPL-MCNC: 7 G/DL — SIGNIFICANT CHANGE UP (ref 6–8.3)
RBC # BLD: 3.85 M/UL — LOW (ref 4.2–5.8)
RBC # FLD: 15.7 % — HIGH (ref 10.3–14.5)
SODIUM SERPL-SCNC: 141 MMOL/L — SIGNIFICANT CHANGE UP (ref 135–145)
SPECIMEN SOURCE: SIGNIFICANT CHANGE UP
WBC # BLD: 10.38 K/UL — SIGNIFICANT CHANGE UP (ref 3.8–10.5)
WBC # FLD AUTO: 10.38 K/UL — SIGNIFICANT CHANGE UP (ref 3.8–10.5)

## 2019-06-14 PROCEDURE — 99233 SBSQ HOSP IP/OBS HIGH 50: CPT

## 2019-06-14 RX ADMIN — DONEPEZIL HYDROCHLORIDE 10 MILLIGRAM(S): 10 TABLET, FILM COATED ORAL at 23:25

## 2019-06-14 RX ADMIN — ATORVASTATIN CALCIUM 80 MILLIGRAM(S): 80 TABLET, FILM COATED ORAL at 23:25

## 2019-06-14 RX ADMIN — Medication 100 MILLIGRAM(S): at 18:07

## 2019-06-14 RX ADMIN — Medication 1 MILLIGRAM(S): at 12:18

## 2019-06-14 RX ADMIN — MONTELUKAST 10 MILLIGRAM(S): 4 TABLET, CHEWABLE ORAL at 12:18

## 2019-06-14 RX ADMIN — PANTOPRAZOLE SODIUM 40 MILLIGRAM(S): 20 TABLET, DELAYED RELEASE ORAL at 06:29

## 2019-06-14 RX ADMIN — TAMSULOSIN HYDROCHLORIDE 0.4 MILLIGRAM(S): 0.4 CAPSULE ORAL at 23:25

## 2019-06-14 RX ADMIN — Medication 100 MILLIGRAM(S): at 06:33

## 2019-06-14 RX ADMIN — AMIODARONE HYDROCHLORIDE 100 MILLIGRAM(S): 400 TABLET ORAL at 06:30

## 2019-06-14 RX ADMIN — APIXABAN 5 MILLIGRAM(S): 2.5 TABLET, FILM COATED ORAL at 18:07

## 2019-06-14 RX ADMIN — Medication 100 MILLIGRAM(S): at 06:29

## 2019-06-14 RX ADMIN — APIXABAN 5 MILLIGRAM(S): 2.5 TABLET, FILM COATED ORAL at 06:30

## 2019-06-14 NOTE — DISCHARGE NOTE PROVIDER - CARE PROVIDER_API CALL
Yamileth Norman)  Internal Medicine  [address] [place] [address]  Phone: [phone]  Fax: [phone]  Follow Up Time: Yamileth Norman)  Internal Medicine  [address] [place] [address]  Phone: [phone]  Fax: [phone]  Follow Up Time:     James Archuleta)  EndocrinologyMetabDiabetes; Internal Medicine  [address] [place] [address]  Phone: [phone]  Fax: [phone]  Follow Up Time:

## 2019-06-14 NOTE — DISCHARGE NOTE PROVIDER - NSDCCPCAREPLAN_GEN_ALL_CORE_FT
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 Per uro outpt notes was scheduled for final dose this month.  -Palpable mass in bladder region. Will order bladder scan to ensure patient not retaining urine. out-pt  follow up.    Diagnosis: Heart failure with preserved ejection fraction  Assessment and Plan of Treatment: -on furosemide every other day. will clarify dose and resume   -euvolemic on exam. CXR clear. HST 45-->43.    Diagnosis: Atrial fibrillation  Assessment and Plan of Treatment: Continue current regimen PRINCIPAL DISCHARGE DIAGNOSIS  Diagnosis: FTT (failure to thrive) in adult  Assessment and Plan of Treatment: Likely due to worsening dementia and underlying malignancy. You were seen by the nutrition team and were recommended supplementation with Ensure Enlive. Continue your medications as directed and please follow-up as an outpatient with your primary care provider for further care and recommendations.          SECONDARY DISCHARGE DIAGNOSES  Diagnosis: Heart failure with preserved ejection fraction  Assessment and Plan of Treatment: Continue recommended medication regimen. Monitor for signs/symptoms of fluid overload and electrolyte abnormalities, such as, shortness of breath, cough, swelling, chest discomfort, changes in heart rate, dizziness, fainting, or changes in mental status. Follow-up with your PCP/cardiologist outpatient after you've been discharged from the hospital.    Diagnosis: Bladder cancer  Assessment and Plan of Treatment: Follow up with your oncologist and urologist as outpatient for further treatment and care. Continue your medications as directed and please follow-up as an outpatient with your primary care provider for further care and recommendations.    Diagnosis: CVA (cerebral vascular accident)  Assessment and Plan of Treatment: Continue with home atorvastatin and ASA, status post permanent pacemaker interrogation, normal device function. Continue your medications as directed and please follow-up as an outpatient with your primary care provider for further care and recommendations.    Diagnosis: GERD (gastroesophageal reflux disease)  Assessment and Plan of Treatment: Continue your medications as directed and please follow-up as an outpatient with your primary care provider for further care and recommendations.    Diagnosis: Asthma, mild intermittent  Assessment and Plan of Treatment: Continue your inhalers and annual pulmonary function tests with your outpatient provider. Monitor for asthma exacerbation, such as, shortness of breath, hyperventilation, or any other difficulties breathing and report to the emergency room in the event that your rescue inhaler has not resolved your symptoms.    Diagnosis: BPH (benign prostatic hyperplasia)  Assessment and Plan of Treatment: Continue with home tamsulosin. Continue your medications as directed and please follow-up as an outpatient with your primary care provider for further care and recommendations.    Diagnosis: Atrial fibrillation  Assessment and Plan of Treatment: Please continue your medications as directed and follow-up with your primary provider/cardiologist to further manage your care. Monitor for signs/symptoms of uncontrolled atrial fibrillation, such as, increased heart rate, palpitations, chest pain, dizziness, or shortness of breath - Return to emergency room if these signs/symptoms are present.    Diagnosis: Dementia  Assessment and Plan of Treatment: Continue your medications as directed and please follow-up as an outpatient with your primary care provider for further care and recommendations. PRINCIPAL DISCHARGE DIAGNOSIS  Diagnosis: FTT (failure to thrive) in adult  Assessment and Plan of Treatment: Likely due to worsening dementia and underlying malignancy. You were seen by the nutrition team and were recommended supplementation with Ensure Enlive. Continue your medications as directed and please follow-up as an outpatient with your primary care provider for further care and recommendations.          SECONDARY DISCHARGE DIAGNOSES  Diagnosis: BPH (benign prostatic hyperplasia)  Assessment and Plan of Treatment: Continue with home tamsulosin. Continue your medications as directed and please follow-up as an outpatient with your primary care provider for further care and recommendations.    Diagnosis: GERD (gastroesophageal reflux disease)  Assessment and Plan of Treatment: Continue your medications as directed and please follow-up as an outpatient with your primary care provider for further care and recommendations.    Diagnosis: CVA (cerebral vascular accident)  Assessment and Plan of Treatment: Continue with home atorvastatin and ASA, status post permanent pacemaker interrogation, normal device function. Continue your medications as directed and please follow-up as an outpatient with your primary care provider for further care and recommendations.    Diagnosis: Dementia  Assessment and Plan of Treatment: Continue your medications as directed and please follow-up as an outpatient with your primary care provider for further care and recommendations.    Diagnosis: Asthma, mild intermittent  Assessment and Plan of Treatment: Continue your inhalers and annual pulmonary function tests with your outpatient provider. Monitor for asthma exacerbation, such as, shortness of breath, hyperventilation, or any other difficulties breathing and report to the emergency room in the event that your rescue inhaler has not resolved your symptoms.    Diagnosis: Heart failure with preserved ejection fraction  Assessment and Plan of Treatment: c/w home atorvastatin and ASA. s/p PPM interrogation, normal device function   Monitor for signs/symptoms of fluid overload and electrolyte abnormalities, such as, shortness of breath, cough, swelling, chest discomfort, changes in heart rate, dizziness, fainting, or changes in mental status. Follow-up with your PCP/cardiologist outpatient after you've been discharged from the hospital.    Diagnosis: Bladder cancer  Assessment and Plan of Treatment: Follow up with your oncologist and urologist as outpatient for further treatment and care. Continue your medications as directed and please follow-up as an outpatient with your primary care provider for further care and recommendations.    Diagnosis: Atrial fibrillation  Assessment and Plan of Treatment: -C/w home apixaban, amio, metoprolol tartrate  - follow-up with your primary provider/cardiologist to further manage your care. Monitor for signs/symptoms of uncontrolled atrial fibrillation, such as, increased heart rate, palpitations, chest pain, dizziness, or shortness of breath - Return to emergency room if these signs/symptoms are present. PRINCIPAL DISCHARGE DIAGNOSIS  Diagnosis: FTT (failure to thrive) in adult  Assessment and Plan of Treatment: Likely due to worsening dementia and underlying malignancy. You were seen by the nutrition team and were recommended supplementation with Ensure Enlive. Continue your medications as directed and please follow-up as an outpatient with your primary care provider for further care and recommendations.          SECONDARY DISCHARGE DIAGNOSES  Diagnosis: Hyperthyroidism  Assessment and Plan of Treatment: TSH dated 6/17 was 1.08 WNL, Free T4 2.1 elevated, most likely due to Amiodarone use. You have been evaluated by endo Dr Kathe Ramirez   *****repeat TFTs in 4 weeks and follow up with endocrinologist ****      Diagnosis: BPH (benign prostatic hyperplasia)  Assessment and Plan of Treatment: Continue with home tamsulosin. Continue your medications as directed and please follow-up as an outpatient with your primary care provider for further care and recommendations.    Diagnosis: GERD (gastroesophageal reflux disease)  Assessment and Plan of Treatment: Continue your medications as directed and please follow-up as an outpatient with your primary care provider for further care and recommendations.    Diagnosis: CVA (cerebral vascular accident)  Assessment and Plan of Treatment: Continue with home atorvastatin and ASA, status post permanent pacemaker interrogation, normal device function. Continue your medications as directed and please follow-up as an outpatient with your primary care provider for further care and recommendations.    Diagnosis: Dementia  Assessment and Plan of Treatment: Continue your medications as directed and please follow-up as an outpatient with your primary care provider for further care and recommendations.    Diagnosis: Asthma, mild intermittent  Assessment and Plan of Treatment: Continue your inhalers and annual pulmonary function tests with your outpatient provider. Monitor for asthma exacerbation, such as, shortness of breath, hyperventilation, or any other difficulties breathing and report to the emergency room in the event that your rescue inhaler has not resolved your symptoms.    Diagnosis: Heart failure with preserved ejection fraction  Assessment and Plan of Treatment: c/w home atorvastatin and ASA. s/p PPM interrogation, normal device function   Monitor for signs/symptoms of fluid overload and electrolyte abnormalities, such as, shortness of breath, cough, swelling, chest discomfort, changes in heart rate, dizziness, fainting, or changes in mental status. Follow-up with your PCP/cardiologist outpatient after you've been discharged from the hospital.    Diagnosis: Bladder cancer  Assessment and Plan of Treatment: Follow up with your oncologist and urologist as outpatient for further treatment and care. Continue your medications as directed and please follow-up as an outpatient with your primary care provider for further care and recommendations.    Diagnosis: Atrial fibrillation  Assessment and Plan of Treatment: -C/w home apixaban, amio, metoprolol tartrate  - follow-up with your primary provider/cardiologist to further manage your care. Monitor for signs/symptoms of uncontrolled atrial fibrillation, such as, increased heart rate, palpitations, chest pain, dizziness, or shortness of breath - Return to emergency room if these signs/symptoms are present. PRINCIPAL DISCHARGE DIAGNOSIS  Diagnosis: FTT (failure to thrive) in adult  Assessment and Plan of Treatment: Likely due to worsening dementia and underlying malignancy. You were seen by the nutrition team and were recommended supplementation with Ensure Enlive. Continue your medications as directed and please follow-up as an outpatient with your primary care provider for further care and recommendations.          SECONDARY DISCHARGE DIAGNOSES  Diagnosis: Hyperthyroidism  Assessment and Plan of Treatment: TSH dated 6/17 was 1.08 WNL, Free T4 2.1 elevated, most likely due to Amiodarone use. You have been evaluated by endo Dr Kathe Ramirez  evaluated by endo recommend to check TSH receptor AB and thyroid peroxidase, pt refused during the hospital stay, repeat w/ next blood draw and will need to repeat TFT  in 4 weeks and follow up with endocrinologist ****      Diagnosis: BPH (benign prostatic hyperplasia)  Assessment and Plan of Treatment: Continue with home tamsulosin. Continue your medications as directed and please follow-up as an outpatient with your primary care provider for further care and recommendations.    Diagnosis: GERD (gastroesophageal reflux disease)  Assessment and Plan of Treatment: Continue your medications as directed and please follow-up as an outpatient with your primary care provider for further care and recommendations.    Diagnosis: CVA (cerebral vascular accident)  Assessment and Plan of Treatment: Continue with home atorvastatin and ASA, status post permanent pacemaker interrogation, normal device function. Continue your medications as directed and please follow-up as an outpatient with your primary care provider for further care and recommendations.    Diagnosis: Dementia  Assessment and Plan of Treatment: Continue your medications as directed and please follow-up as an outpatient with your primary care provider for further care and recommendations.    Diagnosis: Asthma, mild intermittent  Assessment and Plan of Treatment: Continue your inhalers and annual pulmonary function tests with your outpatient provider. Monitor for asthma exacerbation, such as, shortness of breath, hyperventilation, or any other difficulties breathing and report to the emergency room in the event that your rescue inhaler has not resolved your symptoms.    Diagnosis: Heart failure with preserved ejection fraction  Assessment and Plan of Treatment: c/w home atorvastatin and ASA. s/p PPM interrogation, normal device function   Monitor for signs/symptoms of fluid overload and electrolyte abnormalities, such as, shortness of breath, cough, swelling, chest discomfort, changes in heart rate, dizziness, fainting, or changes in mental status. Follow-up with your PCP/cardiologist outpatient after you've been discharged from the hospital.    Diagnosis: Bladder cancer  Assessment and Plan of Treatment: Follow up with your oncologist and urologist as outpatient for further treatment and care. Continue your medications as directed and please follow-up as an outpatient with your primary care provider for further care and recommendations.    Diagnosis: Atrial fibrillation  Assessment and Plan of Treatment: -C/w home apixaban, amio, metoprolol tartrate  - follow-up with your primary provider/cardiologist to further manage your care. Monitor for signs/symptoms of uncontrolled atrial fibrillation, such as, increased heart rate, palpitations, chest pain, dizziness, or shortness of breath - Return to emergency room if these signs/symptoms are present. PRINCIPAL DISCHARGE DIAGNOSIS  Diagnosis: FTT (failure to thrive) in adult  Assessment and Plan of Treatment: Likely due to worsening dementia and underlying malignancy. You were seen by the nutrition team and were recommended supplementation with Ensure Enlive. Continue your medications as directed and please follow-up as an outpatient with your primary care provider for further care and recommendations.          SECONDARY DISCHARGE DIAGNOSES  Diagnosis: Hyperthyroidism  Assessment and Plan of Treatment: TSH dated 6/17 was 1.08 WNL, Free T4 2.1 elevated, most likely due to Amiodarone use. You have been evaluated by endo Dr Kathe Ramirez  evaluated by endo recommend to check TSH receptor AB and thyroid peroxidase, pt refused during the hospital stay, repeat w/ next blood draw and will need to repeat TFT  in 4 weeks and follow up with endocrinologist ****      Diagnosis: BPH (benign prostatic hyperplasia)  Assessment and Plan of Treatment: Continue with home tamsulosin. Continue your medications as directed and please follow-up as an outpatient with your primary care provider for further care and recommendations.    Diagnosis: GERD (gastroesophageal reflux disease)  Assessment and Plan of Treatment: Continue your medications as directed and please follow-up as an outpatient with your primary care provider for further care and recommendations.    Diagnosis: CVA (cerebral vascular accident)  Assessment and Plan of Treatment: Continue with home atorvastatin and ASA, status post permanent pacemaker interrogation, normal device function. Continue your medications as directed and please follow-up as an outpatient with your primary care provider for further care and recommendations.    Diagnosis: Dementia  Assessment and Plan of Treatment: Continue your medications as directed and please follow-up as an outpatient with your primary care provider for further care and recommendations.    Diagnosis: Asthma, mild intermittent  Assessment and Plan of Treatment: Continue your inhalers and annual pulmonary function tests with your outpatient provider. Monitor for asthma exacerbation, such as, shortness of breath, hyperventilation, or any other difficulties breathing and report to the emergency room in the event that your rescue inhaler has not resolved your symptoms.    Diagnosis: Heart failure with preserved ejection fraction  Assessment and Plan of Treatment: c/w home atorvastatin and ASA. s/p PPM interrogation, normal device function Take Lasix 2 x week 20 mg Mon/Thursthday **   Monitor for signs/symptoms of fluid overload and electrolyte abnormalities, such as, shortness of breath, cough, swelling, chest discomfort, changes in heart rate, dizziness, fainting, or changes in mental status. Follow-up with your PCP/cardiologist outpatient after you've been discharged from the hospital.    Diagnosis: Bladder cancer  Assessment and Plan of Treatment: Follow up with your oncologist and urologist as outpatient for further treatment and care. Continue your medications as directed and please follow-up as an outpatient with your primary care provider for further care and recommendations.    Diagnosis: Atrial fibrillation  Assessment and Plan of Treatment: -C/w home apixaban, amio, metoprolol tartrate  - follow-up with your primary provider/cardiologist to further manage your care. Monitor for signs/symptoms of uncontrolled atrial fibrillation, such as, increased heart rate, palpitations, chest pain, dizziness, or shortness of breath - Return to emergency room if these signs/symptoms are present. PRINCIPAL DISCHARGE DIAGNOSIS  Diagnosis: FTT (failure to thrive) in adult  Assessment and Plan of Treatment: Likely due to worsening dementia and underlying malignancy. You were seen by the Nutrition team and were recommended supplementation with Ensure Enlive. Continue your medications as directed and please follow-up as an outpatient with your primary care provider for further care and recommendations.      SECONDARY DISCHARGE DIAGNOSES  Diagnosis: Hyperthyroidism  Assessment and Plan of Treatment: TSH dated 6/17 was 1.08 WNL, Free T4 2.1 elevated, most likely due to Amiodarone use. You have been evaluated by Endo Dr Kathe Ramirez during this hospitalization. Thyroid peroxidase Ab elevated. Pending TSH receptor antibody. Follow up outpatient with Endocrinology (Dr. Archuleta) in 1-2 weeks for further evaluation with thyroid ultrasound and repeat thyroid function tests as warranted.    Diagnosis: BPH (benign prostatic hyperplasia)  Assessment and Plan of Treatment: Continue with Tamsulosin and please follow-up as an outpatient with your primary care provider for further care and recommendations.    Diagnosis: GERD (gastroesophageal reflux disease)  Assessment and Plan of Treatment: Continue your medications as directed and please follow-up as an outpatient with your primary care provider for further care and recommendations.    Diagnosis: CVA (cerebral vascular accident)  Assessment and Plan of Treatment: Continue with Atorvastatin and ASA. Status post permanent pacemaker interrogation, normal device function. Continue your medications as directed and please follow-up as an outpatient with your primary care provider for further care and recommendations.    Diagnosis: Dementia  Assessment and Plan of Treatment: Continue your medications as directed and please follow-up as an outpatient with your primary care provider for further care and recommendations.    Diagnosis: Asthma, mild intermittent  Assessment and Plan of Treatment: Continue your inhalers and annual pulmonary function tests with your outpatient provider. Monitor for asthma exacerbation, such as, shortness of breath, hyperventilation, or any other difficulties breathing and report to the emergency room in the event that your rescue inhaler has not resolved your symptoms.    Diagnosis: Heart failure with preserved ejection fraction  Assessment and Plan of Treatment: Continue with Atorvastatin and ASA. Status post permanent pacemaker interrogation, normal device function. Continue to take Lasix 2 x week 20 mg Mon/Thursthday ** Monitor for signs/symptoms of fluid overload and electrolyte abnormalities, such as, shortness of breath, cough, swelling, chest discomfort, changes in heart rate, dizziness, fainting, or changes in mental status. Follow-up with your PCP/cardiologist outpatient after you've been discharged from the hospital.    Diagnosis: Bladder cancer  Assessment and Plan of Treatment: Follow up with your oncologist and urologist as outpatient for further treatment and care. Continue your medications as directed and please follow-up as an outpatient with your primary care provider for further care and recommendations.    Diagnosis: Atrial fibrillation  Assessment and Plan of Treatment: Continue Apixaban, Amiodarone and Metoprolol. Follow-up with your primary provider/cardiologist to further manage your care. Monitor for signs/symptoms of uncontrolled atrial fibrillation, such as, increased heart rate, palpitations, chest pain, dizziness, or shortness of breath - Return to emergency room if these signs/symptoms are present.

## 2019-06-14 NOTE — CHART NOTE - NSCHARTNOTEFT_GEN_A_CORE
NUTRITION SERVICES     Upon Nutritional Assessment by the Registered Dietitian your patient was determined to meet criteria/ has evidence of the following diagnosis/diagnoses:  [ ] Mild Protein Calorie Malnutrition   [ ] Moderate Protein Calorie Malnutrition   [x ] Severe Protein Calorie Malnutrition   [ ] Unspecified Protein Calorie Malnutrition   [ ] Underweight / BMI <19  [ ] Morbid Obesity / BMI >40    Findings as based on:  •  Comprehensive nutrition assessment and consultation   •  Calorie Counts (nutrient intake analysis)   •  Food acceptance and intake status from observations by staff  •  Follow up  •  Patient education  •  Intervention secondary to interdisciplinary rounds  •   concerns     Treatment:  The following diet has been recommended: Refer to initial assessment completed under document section. RD remains available.

## 2019-06-14 NOTE — DIETITIAN INITIAL EVALUATION ADULT. - ETIOLOGY
Considering weight loss of >5% in 1 month, <50% po in >5 days pt meets the criteria for Severe Malnutrition in context of acute illness.

## 2019-06-14 NOTE — PHYSICAL THERAPY INITIAL EVALUATION ADULT - GAIT DEVIATIONS NOTED, PT EVAL
decreased step length/increased time in double stance/decreased velocity of limb motion/decreased stride length/decreased nathaniel/decreased weight-shifting ability

## 2019-06-14 NOTE — DISCHARGE NOTE PROVIDER - HOSPITAL COURSE
89 year old male with a PMHx of A. 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          Problem/Plan - 1:    ·  Problem: FTT (failure to thrive) in adult.  Plan: - multifactorial, likely to worsening dementia and underlying malignancy    - adequate po here with assistance, stable lytes, renal fx    - nutrition eval.          Problem/Plan - 2:    ·  Problem: Atrial fibrillation.  Plan: -C/w home apixaban, amio, metoprolol tartrate.          Problem/Plan - 3:    ·  Problem: Bladder cancer.  Plan: dx 2014, high grade non-muscle invasive urothelial CA invading lamina propria, recently on gemcitabine installations. Per uro outpt notes was scheduled for final dose this month.    -Palpable mass in bladder region. Will order bladder scan to ensure patient not retaining urine. out-pt  follow up.          Problem/Plan - 4:    ·  Problem: Asthma, mild intermittent.  Plan: -c/w home montelukast.          Problem/Plan - 5:    ·  Problem: Heart failure with preserved ejection fraction.  Plan: -on furosemide every other day. will clarify dose and resume     -euvolemic on exam. CXR clear. HST 45-->43.          Problem/Plan - 6:    Problem: Dementia. Plan: -c/w home donepezil.         Problem/Plan - 7:    ·  Problem: CVA (cerebral vascular accident).  Plan: -c/w home atorvastatin and ASA    interrogate PPM.          Problem/Plan - 8:    ·  Problem: GERD (gastroesophageal reflux disease).  Plan: -c/w home pantoprazole.          Problem/Plan - 9:    ·  Problem: BPH (benign prostatic hyperplasia).  Plan: -c/w home tamsulosin.          Problem/Plan - 10:    Problem: Need for prophylactic measure. Plan; -DVT prophylaxis with apixaban    -Diet regular DASH TLC    - dw family, will follow up today after PT recs as pt is interested in rehab placement, stable for DC - Time spent 40 min. The patient is a 89 year old male with a PMHx of A. 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         FTT (failure to thrive) in adult    - Multifactorial, likely to worsening dementia and underlying malignancy    - nutrition eval recommending supplementation with Ensure® Enlive®.         Atrial fibrillation    - C/w home apixaban, amio, metoprolol tartrate.         Bladder cancer    - dx 2014, high grade non-muscle invasive urothelial CA invading lamina propria, recently on gemcitabine installations. Per uro outpt notes was scheduled for final dose this month.         Asthma, mild intermittent    -c/w home montelukast.         Heart failure with preserved ejection fraction    -on furosemide every other day. will clarify dose and resume     -euvolemic on exam. CXR clear. HST 45-->43.         Dementia    -c/w home donepezil.            CVA (cerebral vascular accident)    -c/w home atorvastatin and ASA    - s/p PPM interrogation, normal device function.             GERD (gastroesophageal reflux disease)    -c/w home pantoprazole.         BPH (benign prostatic hyperplasia)    - c/w home tamsulosin.             AS per medical attending patient is medically stable for discharge to rehab center    Dispo: rehab a 89 year old male with a PMHx of A. 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     - nutrition eval recommending supplementation with Ensure® Enlive®.         Atrial fibrillation-C/w home apixaban, amio, metoprolol tartrate.         Bladder cancer, dx 2014, high grade non-muscle invasive urothelial CA invading lamina propria, recently on gemcitabine installations. Per uro outpt notes was scheduled for final dose this month.    -Palpable mass in bladder region. Will order bladder scan to ensure patient not retaining urine. out-pt  follow up.         Asthma, mild intermittent-c/w home montelukast.         Heart failure with preserved ejection fraction -on furosemide every other day. will clarify dose and resume     -euvolemic on exam. CXR clear. HST 45-->43.          Dementia. Plan: -c/w home donepezil.        CVA -c/w home atorvastatin and ASA. s/p PPM interrogation, normal device function.         GERD -c/w home pantoprazole.         BPH -c/w home tamsulosin.     Pt is optimized for d/c to rehab a 89 year old male with a PMHx of A. 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     - nutrition eval recommending supplementation with Ensure® Enlive®.         Atrial fibrillation-C/w home apixaban, amio, metoprolol tartrate.     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 Per uro outpt notes was scheduled for final dose this month.    -Palpable mass in bladder region. Will order bladder scan to ensure patient not retaining urine. out-pt  follow up.         Asthma, mild intermittent-c/w home montelukast.         Heart failure with preserved ejection fraction -on furosemide every other day. will clarify dose and resume     -euvolemic on exam. CXR clear. HST 45-->43.          Dementia. Plan: -c/w home donepezil.        CVA -c/w home atorvastatin and ASA. s/p PPM interrogation, normal device function.         GERD -c/w home pantoprazole.         BPH -c/w home tamsulosin.     Pt is optimized for d/c to rehab a 89 year old male with a PMHx of A. 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     - nutrition eval recommending supplementation with Ensure® Enlive®.         Atrial fibrillation-C/w home apixaban, amio, metoprolol tartrate.     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 Per uro outpt notes was scheduled for final dose this month.    -Palpable mass in bladder region. Will order bladder scan to ensure patient not retaining urine. out-pt  follow up.         Asthma, mild intermittent-c/w home montelukast.         Heart failure with preserved ejection fraction -on furosemide every other day. -euvolemic on exam. CXR clear. Resumed 2 x week 20 mg Mon/Thus         Dementia. Plan: -c/w home donepezil.        CVA -c/w home atorvastatin and ASA. s/p PPM interrogation, normal device function.         GERD -c/w home pantoprazole.         BPH -c/w home tamsulosin.     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 Nutritional consulted, recommended Ensure Enlive.         Atrial fibrillation - continue Apixaban, Amiodarone, BB. 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 Endo consulted. TSH 1.04. FT4 2.1. TPO Ab elevated. Pending TSH Ab (received  in lab). Stable, F/U outpatient Endo for repeat TFTs and US thyroid    Bladder Ca - per Urology note, pt scheduled for last dose this month. Stable, F/U outpatient Urology.     Asthma, mild intermittent - continue Montelukast. Stable, F/U outpatient PCP     Heart failure with preserved EF - CXR clear. Euvolemic on exam. Continue Lasix M/T. Stable, F/U outpatient PCP     Dementia - continue Donepezil.  Stable, F/U outpatient PCP     CVA - continue Atorvastatin and ASA. s/p PPM interrogation, normal device function. Stable, F/U outpatient PCP     GERD - continue PPI. Stable, F/U outpatient PCP     BPH - continue Tamsulosin. Stable, F/U outpatient PCP         Discharge to rehab

## 2019-06-14 NOTE — PHYSICAL THERAPY INITIAL EVALUATION ADULT - ADDITIONAL COMMENTS
Patient's wife at bedside provided information. Patient lives with wife and daughter in a private house, 3 steps to enter. Per daughter, patient required varying levels of assistance with ADLs and ambulated with a rollator prior to admission.    Patient was left semi-supine in bed as found, all lines/tubes intact and call garcia within reach, RN Joss beckwith (RN covering for Robyn PADILLA RN)

## 2019-06-14 NOTE — DIETITIAN INITIAL EVALUATION ADULT. - PROBLEM SELECTOR PLAN 1
-decreased PO intake, weakness, 20 pound weightloss, lack of appetite  -unclear if due to underyling malignancy vs other cause (though no known illnesses, medication changes, electrolyte changes) vs worsening dementia  -PT consult, [facility] estrada, nutrition consult

## 2019-06-14 NOTE — PROGRESS NOTE ADULT - PROBLEM SELECTOR PLAN 10
-DVT prophylaxis with apixaban  -Diet regular DASH TLC  - dw family, will follow up today after PT recs to determine wishes for placement, rehab, vs home. if for home, stable for DC - Time spent 40 min -DVT prophylaxis with apixaban  -Diet regular DASH TLC  - dw family, will follow up today after PT recs as pt is interested in rehab placement, stable for DC - Time spent 40 min

## 2019-06-14 NOTE — DIETITIAN INITIAL EVALUATION ADULT. - PROBLEM SELECTOR PLAN 3
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 Per uro outpt notes was scheduled for final dose this month.  -Palpable mass in bladder region. Will order bladder scan to ensure patient not retaining urine. Consider URO c/s in AM

## 2019-06-14 NOTE — DISCHARGE NOTE PROVIDER - PROVIDER TOKENS
PROVIDER:[TOKEN:[[id number]]] PROVIDER:[TOKEN:[[id number]]],PROVIDER:[TOKEN:[[id number]]]

## 2019-06-14 NOTE — DIETITIAN INITIAL EVALUATION ADULT. - OTHER INFO
Nutrition consult ordered for RD assessment for poor po 5 day prior to admission. 89y/o male admitted with  the DX of 20 weight loss, lack of appetite, failure to thrive, hematuria, bladder CA, reported to have good po and appetite this morning  with no nausea, vomiting and diarrhea. Pt  requires encouragement to finish meals and   total assist. labs reviewed,   monitor phos level, current  diet remains appropriate,     recommend to add Ensure  enlive 1 can po 2 x daily to  increase nutritional Intake . Considering weight loss of >5% in 1 month, <50% po in >5 days pt meets the criteria for Severe Malnutrition  in context of acute illness. Pt encouraged to increase po. Case discussed with RN. RD remains available.

## 2019-06-14 NOTE — DISCHARGE NOTE PROVIDER - CARE PROVIDERS DIRECT ADDRESSES
[email or web address]

## 2019-06-14 NOTE — PHYSICAL THERAPY INITIAL EVALUATION ADULT - PERTINENT HX OF CURRENT PROBLEM, REHAB EVAL
Patient is a 89 year old male admitted to [facility] on 6/12 with a 2 week hx of poor PO intake and weakness. PMH: A. fib, bladder cancer (diagnosed 2014, high grade nonmuscle invasive urothelial cancer invading lamina propria, recently on gemcitabine installations), Asthma, BPH, CVA x 2 (most recent 2012, with residual bilateral vision loss), HTN, HLD, ptosis to left eye (from prior eyelid lift), and dementia.

## 2019-06-15 PROCEDURE — 99232 SBSQ HOSP IP/OBS MODERATE 35: CPT

## 2019-06-15 RX ORDER — POTASSIUM CHLORIDE 20 MEQ
10 PACKET (EA) ORAL ONCE
Refills: 0 | Status: COMPLETED | OUTPATIENT
Start: 2019-06-15 | End: 2019-06-15

## 2019-06-15 RX ADMIN — APIXABAN 5 MILLIGRAM(S): 2.5 TABLET, FILM COATED ORAL at 06:24

## 2019-06-15 RX ADMIN — MONTELUKAST 10 MILLIGRAM(S): 4 TABLET, CHEWABLE ORAL at 11:10

## 2019-06-15 RX ADMIN — ATORVASTATIN CALCIUM 80 MILLIGRAM(S): 80 TABLET, FILM COATED ORAL at 21:23

## 2019-06-15 RX ADMIN — Medication 100 MILLIGRAM(S): at 18:40

## 2019-06-15 RX ADMIN — TAMSULOSIN HYDROCHLORIDE 0.4 MILLIGRAM(S): 0.4 CAPSULE ORAL at 21:23

## 2019-06-15 RX ADMIN — DONEPEZIL HYDROCHLORIDE 10 MILLIGRAM(S): 10 TABLET, FILM COATED ORAL at 21:23

## 2019-06-15 RX ADMIN — Medication 100 MILLIGRAM(S): at 06:24

## 2019-06-15 RX ADMIN — APIXABAN 5 MILLIGRAM(S): 2.5 TABLET, FILM COATED ORAL at 18:40

## 2019-06-15 RX ADMIN — Medication 1 MILLIGRAM(S): at 11:10

## 2019-06-15 RX ADMIN — AMIODARONE HYDROCHLORIDE 100 MILLIGRAM(S): 400 TABLET ORAL at 06:24

## 2019-06-15 RX ADMIN — Medication 10 MILLIEQUIVALENT(S): at 11:10

## 2019-06-15 RX ADMIN — PANTOPRAZOLE SODIUM 40 MILLIGRAM(S): 20 TABLET, DELAYED RELEASE ORAL at 06:24

## 2019-06-15 NOTE — PROGRESS NOTE ADULT - PROBLEM SELECTOR PLAN 10
- DVT prophylaxis with apixaban  - Diet regular DASH TLC  - Dispo likely to rehab- Time spent coordinating discharge 40 min, reviewed medications

## 2019-06-16 PROCEDURE — 99232 SBSQ HOSP IP/OBS MODERATE 35: CPT

## 2019-06-16 RX ADMIN — Medication 100 MILLIGRAM(S): at 17:23

## 2019-06-16 RX ADMIN — APIXABAN 5 MILLIGRAM(S): 2.5 TABLET, FILM COATED ORAL at 08:31

## 2019-06-16 RX ADMIN — AMIODARONE HYDROCHLORIDE 100 MILLIGRAM(S): 400 TABLET ORAL at 08:31

## 2019-06-16 RX ADMIN — MONTELUKAST 10 MILLIGRAM(S): 4 TABLET, CHEWABLE ORAL at 12:13

## 2019-06-16 RX ADMIN — DONEPEZIL HYDROCHLORIDE 10 MILLIGRAM(S): 10 TABLET, FILM COATED ORAL at 22:11

## 2019-06-16 RX ADMIN — TAMSULOSIN HYDROCHLORIDE 0.4 MILLIGRAM(S): 0.4 CAPSULE ORAL at 22:11

## 2019-06-16 RX ADMIN — ATORVASTATIN CALCIUM 80 MILLIGRAM(S): 80 TABLET, FILM COATED ORAL at 22:11

## 2019-06-16 RX ADMIN — APIXABAN 5 MILLIGRAM(S): 2.5 TABLET, FILM COATED ORAL at 17:23

## 2019-06-16 RX ADMIN — Medication 1 MILLIGRAM(S): at 12:13

## 2019-06-16 RX ADMIN — Medication 100 MILLIGRAM(S): at 08:31

## 2019-06-16 NOTE — PROVIDER CONTACT NOTE (MEDICATION) - SITUATION
Pt refusing AM meds, metropolol, amiodarone, and eliquis. Pt educated on importance of medications, still refusing, 3 RN's attempted to give medication at three separate times, pt refused.

## 2019-06-16 NOTE — PROVIDER CONTACT NOTE (MEDICATION) - RECOMMENDATIONS
Reschedule meds and reattempt to administer meds, continue to monitor and educated pt on importance of medication

## 2019-06-17 DIAGNOSIS — E05.90 THYROTOXICOSIS, UNSPECIFIED WITHOUT THYROTOXIC CRISIS OR STORM: ICD-10-CM

## 2019-06-17 DIAGNOSIS — I10 ESSENTIAL (PRIMARY) HYPERTENSION: ICD-10-CM

## 2019-06-17 LAB
T4 FREE SERPL-MCNC: 2.1 NG/DL — HIGH (ref 0.9–1.8)
TSH SERPL-MCNC: 1.08 UIU/ML — SIGNIFICANT CHANGE UP (ref 0.27–4.2)

## 2019-06-17 PROCEDURE — 99232 SBSQ HOSP IP/OBS MODERATE 35: CPT

## 2019-06-17 RX ADMIN — Medication 100 MILLIGRAM(S): at 06:27

## 2019-06-17 RX ADMIN — Medication 100 MILLIGRAM(S): at 17:16

## 2019-06-17 RX ADMIN — APIXABAN 5 MILLIGRAM(S): 2.5 TABLET, FILM COATED ORAL at 06:27

## 2019-06-17 RX ADMIN — AMIODARONE HYDROCHLORIDE 100 MILLIGRAM(S): 400 TABLET ORAL at 06:27

## 2019-06-17 RX ADMIN — MONTELUKAST 10 MILLIGRAM(S): 4 TABLET, CHEWABLE ORAL at 11:41

## 2019-06-17 RX ADMIN — TAMSULOSIN HYDROCHLORIDE 0.4 MILLIGRAM(S): 0.4 CAPSULE ORAL at 21:58

## 2019-06-17 RX ADMIN — DONEPEZIL HYDROCHLORIDE 10 MILLIGRAM(S): 10 TABLET, FILM COATED ORAL at 21:59

## 2019-06-17 RX ADMIN — APIXABAN 5 MILLIGRAM(S): 2.5 TABLET, FILM COATED ORAL at 17:16

## 2019-06-17 RX ADMIN — ATORVASTATIN CALCIUM 80 MILLIGRAM(S): 80 TABLET, FILM COATED ORAL at 21:58

## 2019-06-17 RX ADMIN — Medication 1 MILLIGRAM(S): at 11:41

## 2019-06-17 RX ADMIN — PANTOPRAZOLE SODIUM 40 MILLIGRAM(S): 20 TABLET, DELAYED RELEASE ORAL at 06:28

## 2019-06-17 NOTE — PROGRESS NOTE ADULT - PROBLEM SELECTOR PLAN 2
-C/w home apixaban, amio, metoprolol tartrate  - Heart rate controlled -C/w home apixaban, amio, metoprolol tartrate  - Heart rate controlled  -pt on amio  and a/w FTT , will check TFT

## 2019-06-17 NOTE — CONSULT NOTE ADULT - SUBJECTIVE AND OBJECTIVE BOX
HPI:  The patient is a 89 year old male with a PMHx of A. 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 The patient was able to tell me his full name and the POTUS however he could not tell me where he was, why he was in the hospital, or what year it was. His wife was at the bedside and she provided additional hx. She reports he has been having difficulty ambulating with his walker recently. She reports that he has been eating and drinking significantly less than usual. He has had a 20 pound unintentional weight loss. The patient is incontinent and uses diapers. She denies the patient having any recent illnesses, fevers, or changes in medications.     In the ED vital signs: 98.4, 60, 115/54, 18, 98 on RA. The patient received 500cc of .9NS. (13 Jun 2019 02:50)  Patient has no history of hyperthyroidism not on any thyroid supplements he is on amiodarone now, denies palpitations, no neck pain. Patient follows up with PCP for health management.    PAST MEDICAL & SURGICAL HISTORY:  Memory loss  Murmur, cardiac  UTI (urinary tract infection): as of 2-20-19, patient on cipro  Hearing loss: bilateral  Snoring: MAYA precautions -- responds affirmatively to STOP BANG questionnaire  Alcohol abuse: quit in 2012 after first CVA -- had been heavy drinker  Irregular heart beat: Pacemaker--Medtronic (advisa) placed 10-16-15; model #A3SR01; serial number [id number]  Hypertension  Erosion of penile prosthesis  Bladder mass  Dementia  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  BPH (benign prostatic hypertrophy)  Hematuria  Asthma, mild intermittent: last use of inhalers greater than 6 months ago  Dyslipidemia  HTN (hypertension), benign  Atrial fibrillation: diagnosed approximately 2008,  treated medically, on coumadin  Pain: penile prosthesis removed due to erosion  Eye abnormality: post surgery had ptosis left eye (ptosis NOT from CVA)  Bladder tumor: cysto, TURBT  History of permanent cardiac pacemaker placement: Medtronic (advisa) placed 10-16-15; model # A3SR01; serial number [id number]  S/P TURP      FAMILY HISTORY:  Family history of breast cancer in sister (Sibling)  Family history of type II diabetes mellitus  Family history of myocardial infarction      Social History:    Outpatient Medications:    MEDICATIONS  (STANDING):  amiodarone    Tablet 100 milliGRAM(s) Oral daily  apixaban 5 milliGRAM(s) Oral every 12 hours  atorvastatin 80 milliGRAM(s) Oral at bedtime  docusate sodium 100 milliGRAM(s) Oral two times a day  donepezil 10 milliGRAM(s) Oral at bedtime  folic acid 1 milliGRAM(s) Oral daily  metoprolol tartrate 100 milliGRAM(s) Oral two times a day  montelukast 10 milliGRAM(s) Oral daily  pantoprazole    Tablet 40 milliGRAM(s) Oral before breakfast  tamsulosin 0.4 milliGRAM(s) Oral at bedtime    MEDICATIONS  (PRN):      Allergies    No Known Allergies    Intolerances      Review of Systems:  Constitutional: No fever, no chills  Eyes: No blurry vision  Neuro: No tremors  HEENT: No pain, no neck swelling  Cardiovascular: No chest pain, no palpitations  Respiratory: Has SOB, no cough  GI: No nausea, vomiting, abdominal pain  : No dysuria  Skin: no rash  MSK: Has leg swelling.  Psych: no depression  Endocrine: no polyuria, polydipsia    ALL OTHER SYSTEMS REVIEWED AND NEGATIVE    UNABLE TO OBTAIN    PHYSICAL EXAM:  VITALS: T(C): 36.8 (06-17-19 @ 06:26)  T(F): 98.2 (06-17-19 @ 06:26), Max: 98.2 (06-16-19 @ 17:20)  HR: 79 (06-17-19 @ 06:26) (70 - 79)  BP: 142/75 (06-17-19 @ 06:26) (123/76 - 142/82)  RR:  (17 - 18)  SpO2:  (97% - 98%)  Wt(kg): --  GENERAL: NAD, well-groomed, well-developed  EYES: No proptosis, no lid lag  HEENT:  Atraumatic, Normocephalic  THYROID: Normal size, no palpable nodules  RESPIRATORY: Clear to auscultation bilaterally; No rales, rhonchi, wheezing  CARDIOVASCULAR: Si S2, No murmurs;  GI: Soft, non distended, normal bowel sounds  SKIN: Dry, intact, No rashes or lesions  MUSCULOSKELETAL: Has BL lower extremity edema.  NEURO:  no tremor, sensation decreased in feet BL,                    Thyroid Function Tests:  06-14 @ 06:00 TSH 1.08 FreeT4 2.10 T3 -- Anti TPO -- Anti Thyroglobulin Ab -- TSI --              Radiology:

## 2019-06-17 NOTE — CONSULT NOTE ADULT - ASSESSMENT
patient seen and chart reviewd, full note to follow.  Assessment  Hyperthyroidism:: 89y Male with no history of hyperthyroidism not on any thyroid supplements he is on amiodarone now likely causing elevated Free T4, , denies palpitations, no neck pain. Patient follows up with PCP for health management.  Afib/CAD: on medications, no chest pain, stable, monitored.  Dementia/Cva: on meds, stable.      James Archuleta MD  Cell: [phone]  Office: [phone]

## 2019-06-17 NOTE — PROGRESS NOTE ADULT - ATTENDING COMMENTS
MEMO planning to [facility] once bed available  Patient hemodynamically stable for discharge home  Time spent in discharge process is 40 min  Message left for family at [phone] DC planning to [facility] once bed available  Message left for family at [phone]

## 2019-06-18 PROCEDURE — 99232 SBSQ HOSP IP/OBS MODERATE 35: CPT

## 2019-06-18 RX ORDER — FUROSEMIDE 40 MG
20 TABLET ORAL
Refills: 0 | Status: DISCONTINUED | OUTPATIENT
Start: 2019-06-18 | End: 2019-06-19

## 2019-06-18 RX ADMIN — MONTELUKAST 10 MILLIGRAM(S): 4 TABLET, CHEWABLE ORAL at 12:18

## 2019-06-18 RX ADMIN — Medication 100 MILLIGRAM(S): at 05:28

## 2019-06-18 RX ADMIN — Medication 100 MILLIGRAM(S): at 17:57

## 2019-06-18 RX ADMIN — DONEPEZIL HYDROCHLORIDE 10 MILLIGRAM(S): 10 TABLET, FILM COATED ORAL at 21:03

## 2019-06-18 RX ADMIN — ATORVASTATIN CALCIUM 80 MILLIGRAM(S): 80 TABLET, FILM COATED ORAL at 21:03

## 2019-06-18 RX ADMIN — Medication 1 MILLIGRAM(S): at 12:18

## 2019-06-18 RX ADMIN — APIXABAN 5 MILLIGRAM(S): 2.5 TABLET, FILM COATED ORAL at 05:29

## 2019-06-18 RX ADMIN — APIXABAN 5 MILLIGRAM(S): 2.5 TABLET, FILM COATED ORAL at 17:57

## 2019-06-18 RX ADMIN — TAMSULOSIN HYDROCHLORIDE 0.4 MILLIGRAM(S): 0.4 CAPSULE ORAL at 21:03

## 2019-06-18 RX ADMIN — PANTOPRAZOLE SODIUM 40 MILLIGRAM(S): 20 TABLET, DELAYED RELEASE ORAL at 05:29

## 2019-06-18 RX ADMIN — Medication 100 MILLIGRAM(S): at 17:58

## 2019-06-18 RX ADMIN — AMIODARONE HYDROCHLORIDE 100 MILLIGRAM(S): 400 TABLET ORAL at 05:28

## 2019-06-18 NOTE — PROGRESS NOTE ADULT - PROBLEM SELECTOR PLAN 2
-C/w home apixaban, amio, metoprolol tartrate  - Heart rate controlled  -pt on amio  and a/w FTT , TFT noted, elevated FT4

## 2019-06-19 ENCOUNTER — TRANSCRIPTION ENCOUNTER (OUTPATIENT)
Age: 84
End: 2019-06-19

## 2019-06-19 VITALS
HEART RATE: 69 BPM | RESPIRATION RATE: 16 BRPM | DIASTOLIC BLOOD PRESSURE: 80 MMHG | TEMPERATURE: 99 F | SYSTOLIC BLOOD PRESSURE: 148 MMHG | OXYGEN SATURATION: 100 %

## 2019-06-19 LAB
T4 FREE SERPL-MCNC: 2.14 NG/DL — HIGH (ref 0.9–1.8)
THYROPEROXIDASE AB SERPL-ACNC: 19.2 IU/ML — SIGNIFICANT CHANGE UP (ref 0–34.9)
THYROPEROXIDASE AB SERPL-ACNC: 44.6 IU/ML — HIGH (ref 0–34.9)
TSH SERPL-MCNC: 0.92 UIU/ML — SIGNIFICANT CHANGE UP (ref 0.27–4.2)

## 2019-06-19 PROCEDURE — 99239 HOSP IP/OBS DSCHRG MGMT >30: CPT

## 2019-06-19 RX ORDER — FUROSEMIDE 40 MG
1 TABLET ORAL
Qty: 0 | Refills: 0 | DISCHARGE
Start: 2019-06-19

## 2019-06-19 RX ADMIN — APIXABAN 5 MILLIGRAM(S): 2.5 TABLET, FILM COATED ORAL at 05:36

## 2019-06-19 RX ADMIN — AMIODARONE HYDROCHLORIDE 100 MILLIGRAM(S): 400 TABLET ORAL at 05:36

## 2019-06-19 RX ADMIN — PANTOPRAZOLE SODIUM 40 MILLIGRAM(S): 20 TABLET, DELAYED RELEASE ORAL at 05:37

## 2019-06-19 RX ADMIN — Medication 100 MILLIGRAM(S): at 05:36

## 2019-06-19 RX ADMIN — MONTELUKAST 10 MILLIGRAM(S): 4 TABLET, CHEWABLE ORAL at 13:50

## 2019-06-19 RX ADMIN — Medication 1 MILLIGRAM(S): at 13:51

## 2019-06-19 RX ADMIN — Medication 100 MILLIGRAM(S): at 05:37

## 2019-06-19 NOTE — PROGRESS NOTE ADULT - PROBLEM SELECTOR PLAN 9
-c/w home tamsulosin

## 2019-06-19 NOTE — DISCHARGE NOTE NURSING/CASE MANAGEMENT/SOCIAL WORK - NSDCDPATPORTLINK_GEN_ALL_CORE
You can access the [facility] Patient Portal, offered by [facility], by registering with the following website: [email or web address]

## 2019-06-19 NOTE — PROGRESS NOTE ADULT - PROBLEM SELECTOR PROBLEM 7
Dementia
CVA (cerebral vascular accident)
Dementia

## 2019-06-19 NOTE — PROGRESS NOTE ADULT - PROBLEM SELECTOR PROBLEM 6
Heart failure with preserved ejection fraction
Dementia
Heart failure with preserved ejection fraction

## 2019-06-19 NOTE — PROGRESS NOTE ADULT - PROBLEM SELECTOR PLAN 5
-c/w home montelukast
-on furosemide every other day. will clarify dose and resume   -euvolemic on exam. CXR clear. HST 45-->43
-c/w home montelukast
-on furosemide every other day. will clarify dose and resume   -euvolemic on exam. CXR clear. HST 45-->43

## 2019-06-19 NOTE — PROGRESS NOTE ADULT - PROBLEM SELECTOR PROBLEM 3
Bladder cancer
Dementia
Dementia
Hyperthyroidism
Hyperthyroidism

## 2019-06-19 NOTE — PROGRESS NOTE ADULT - PROBLEM SELECTOR PROBLEM 5
Asthma, mild intermittent
Heart failure with preserved ejection fraction
Asthma, mild intermittent
Heart failure with preserved ejection fraction

## 2019-06-19 NOTE — PROGRESS NOTE ADULT - PROBLEM SELECTOR PROBLEM 4
Bladder cancer
Asthma, mild intermittent
Bladder cancer
HTN (hypertension), benign
HTN (hypertension), benign

## 2019-06-19 NOTE — PROGRESS NOTE ADULT - PROBLEM SELECTOR PROBLEM 1
FTT (failure to thrive) in adult
Hyperthyroidism
Hyperthyroidism
FTT (failure to thrive) in adult

## 2019-06-19 NOTE — DISCHARGE NOTE NURSING/CASE MANAGEMENT/SOCIAL WORK - NSDCPEPTSTRK_GEN_ALL_CORE
Need for follow up after discharge/Stroke education booklet/Prescribed medications/Risk factors for stroke/Stroke support groups for patients, families, and friends/Stroke warning signs and symptoms/Signs and symptoms of stroke/Call 911 for stroke

## 2019-06-19 NOTE — PROGRESS NOTE ADULT - ATTENDING COMMENTS
DC planning to [facility] once bed available  case d/w Dtr She at  [phone] on 6/18  Patient hemodynamically stable for discharge to rehab   Time spent in discharge process is 45 min

## 2019-06-19 NOTE — PROGRESS NOTE ADULT - PROVIDER SPECIALTY LIST ADULT
Endocrinology
Endocrinology
Hospitalist

## 2019-06-19 NOTE — PROGRESS NOTE ADULT - PROBLEM SELECTOR PLAN 7
-c/w home donepezil  - supportive  care
-c/w home atorvastatin and ASA  interrogate PPM
-c/w home atorvastatin and ASA  - s/p PPM interrogation, normal device function
-c/w home atorvastatin and ASA  interrogate PPM
-c/w home donepezil  - supportive  care

## 2019-06-19 NOTE — PROGRESS NOTE ADULT - SUBJECTIVE AND OBJECTIVE BOX
Patient is a 89y old  Male who presents with a chief complaint of FTT (2019 02:50)      SUBJECTIVE / OVERNIGHT EVENTS: denies complaints but is poor historian     ROS:  No HA/DZ  No Vision changes   No CP, SOB  No N/V/D  No Edema  No Rash  NO weakness, numbness    MEDICATIONS  (STANDING):  amiodarone    Tablet 100 milliGRAM(s) Oral daily  apixaban 5 milliGRAM(s) Oral every 12 hours  atorvastatin 80 milliGRAM(s) Oral at bedtime  docusate sodium 100 milliGRAM(s) Oral two times a day  donepezil 10 milliGRAM(s) Oral at bedtime  folic acid 1 milliGRAM(s) Oral daily  metoprolol tartrate 100 milliGRAM(s) Oral two times a day  montelukast 10 milliGRAM(s) Oral daily  pantoprazole    Tablet 40 milliGRAM(s) Oral before breakfast  tamsulosin 0.4 milliGRAM(s) Oral at bedtime    MEDICATIONS  (PRN):      T(C): 36.8 (19 @ 08:01)  HR: 77 (19 @ 08:01)  BP: 147/93 (19 @ 08:01)  RR: 16 (19 @ 08:01)  SpO2: 98% (19 @ 08:01)  CAPILLARY BLOOD GLUCOSE      POCT Blood Glucose.: 110 mg/dL (2019 19:50)    I&O's Summary      PHYSICAL EXAM:  GENERAL: NAD, well-developed, AOx1  HEAD:  Atraumatic, Normocephalic  EYES: EOMI, PERRL, conjunctiva and sclera clear  NECK: Supple, No JVD  CHEST/LUNG: Clear to auscultation bilaterally  HEART: Regular rate and rhythm; No murmurs, rubs, or gallops, No Edema  ABDOMEN: Soft, Nontender, Nondistended; Bowel sounds present  EXTREMITIES:  2+ Peripheral Pulses, No clubbing, cyanosis  PSYCH: No SI/HI  NEUROLOGY: non-focal  SKIN: No rashes or lesions    LABS:                        10.2   9.48  )-----------( 207      ( 2019 21:25 )             33.8     06-12    140  |  101  |  17  ----------------------------<  112<H>  5.3   |  28  |  1.04    Ca    9.2      2019 21:25    TPro  7.8  /  Alb  2.8<L>  /  TBili  0.5  /  DBili  x   /  AST  35  /  ALT  16  /  AlkPhos  95  06-12      CARDIAC MARKERS ( 2019 21:25 )  x     / x     / 274 u/L / 1.44 ng/mL / x          Urinalysis Basic - ( 2019 23:03 )    Color: YELLOW / Appearance: CLEAR / S.019 / pH: 6.0  Gluc: NEGATIVE / Ketone: NEGATIVE  / Bili: NEGATIVE / Urobili: SMALL   Blood: SMALL / Protein: 50 / Nitrite: NEGATIVE   Leuk Esterase: NEGATIVE / RBC: 6-10 / WBC 0-2   Sq Epi: FEW / Non Sq Epi: x / Bacteria: NEGATIVE            RADIOLOGY & ADDITIONAL TESTS:    Imaging Personally Reviewed:    Consultant(s) Notes Reviewed:      Care Discussed with Consultants/Other Providers:
Chief complaint  Patient is a 89y old  Male who presents with a chief complaint of FTT (18 Jun 2019 17:16)   Review of systems  Patient in bed, looks comfortable, no fever, no hypoglycemia.    Labs and Fingersticks  CAPILLARY BLOOD GLUCOSE  Medications  MEDICATIONS  (STANDING):  amiodarone    Tablet 100 milliGRAM(s) Oral daily  apixaban 5 milliGRAM(s) Oral every 12 hours  atorvastatin 80 milliGRAM(s) Oral at bedtime  docusate sodium 100 milliGRAM(s) Oral two times a day  donepezil 10 milliGRAM(s) Oral at bedtime  folic acid 1 milliGRAM(s) Oral daily  furosemide    Tablet 20 milliGRAM(s) Oral <User Schedule>  metoprolol tartrate 100 milliGRAM(s) Oral two times a day  montelukast 10 milliGRAM(s) Oral daily  pantoprazole    Tablet 40 milliGRAM(s) Oral before breakfast  tamsulosin 0.4 milliGRAM(s) Oral at bedtime      Physical Exam  General: Patient comfortable in bed  Vital Signs Last 12 Hrs  T(F): 98.2 (06-18-19 @ 12:16), Max: 98.2 (06-18-19 @ 12:16)  HR: 71 (06-18-19 @ 17:57) (71 - 72)  BP: 124/62 (06-18-19 @ 17:57) (118/61 - 124/62)  BP(mean): --  RR: 18 (06-18-19 @ 12:16) (18 - 18)  SpO2: 100% (06-18-19 @ 12:16) (100% - 100%)  Neck: No palpable thyroid nodules.  CVS: S1S2, No murmurs  Respiratory: No wheezing, no crepitations  GI: Abdomen soft, bowel sounds positive  Musculoskeletal:  edema lower extremities.   Skin: No skin rashes, no ecchymosis    Diagnostics
Chief complaint  Patient is a 89y old  Male who presents with a chief complaint of FTT (18 Jun 2019 18:11)   Review of systems  Patient in bed, looks comfortable, no fever, no hypoglycemia.    Labs and Fingersticks  CAPILLARY BLOOD GLUCOSE    Medications  MEDICATIONS  (STANDING):  amiodarone    Tablet 100 milliGRAM(s) Oral daily  apixaban 5 milliGRAM(s) Oral every 12 hours  atorvastatin 80 milliGRAM(s) Oral at bedtime  docusate sodium 100 milliGRAM(s) Oral two times a day  donepezil 10 milliGRAM(s) Oral at bedtime  folic acid 1 milliGRAM(s) Oral daily  furosemide    Tablet 20 milliGRAM(s) Oral <User Schedule>  metoprolol tartrate 100 milliGRAM(s) Oral two times a day  montelukast 10 milliGRAM(s) Oral daily  pantoprazole    Tablet 40 milliGRAM(s) Oral before breakfast  tamsulosin 0.4 milliGRAM(s) Oral at bedtime      Physical Exam  General: Patient comfortable in bed  Vital Signs Last 12 Hrs  T(F): 98.1 (06-19-19 @ 05:34), Max: 98.1 (06-19-19 @ 05:34)  HR: 79 (06-19-19 @ 05:34) (79 - 79)  BP: 141/69 (06-19-19 @ 05:34) (141/69 - 141/69)  BP(mean): --  RR: 19 (06-19-19 @ 05:34) (19 - 19)  SpO2: 100% (06-19-19 @ 05:34) (100% - 100%)  Neck: No palpable thyroid nodules.  CVS: S1S2, No murmurs  Respiratory: No wheezing, no crepitations  GI: Abdomen soft, bowel sounds positive  Musculoskeletal:  edema lower extremities.   Skin: No skin rashes, no ecchymosis    Diagnostics    Thyroid Stimulating Hormone, Serum: AM Sched. Collection: 19-Jun-2019 04:00 (06-18 @ 18:15)  TSH Receptor Antibody: AM Sched. Collection: 19-Jun-2019 04:00 (06-18 @ 18:15)  Free Thyroxine, Serum: AM Sched. Collection: 19-Jun-2019 04:00 (06-18 @ 18:15)  Thyroperoxidase Antibody: AM Sched. Collection: 19-Jun-2019 04:00 (06-18 @ 18:15)  Vitamin D, 25-Hydroxy: AM Sched. Collection: 19-Jun-2019 04:00 (06-18 @ 18:15)
Patient is a 89y old  Male who presents with a chief complaint of FTT (14 Jun 2019 15:27)    SUBJECTIVE / OVERNIGHT EVENTS:  Patient seen denying any complaints, upset with family.     MEDICATIONS  (STANDING):  amiodarone    Tablet 100 milliGRAM(s) Oral daily  apixaban 5 milliGRAM(s) Oral every 12 hours  atorvastatin 80 milliGRAM(s) Oral at bedtime  docusate sodium 100 milliGRAM(s) Oral two times a day  donepezil 10 milliGRAM(s) Oral at bedtime  folic acid 1 milliGRAM(s) Oral daily  metoprolol tartrate 100 milliGRAM(s) Oral two times a day  montelukast 10 milliGRAM(s) Oral daily  pantoprazole    Tablet 40 milliGRAM(s) Oral before breakfast  tamsulosin 0.4 milliGRAM(s) Oral at bedtime    MEDICATIONS  (PRN):      Vital Signs Last 24 Hrs  T(C): 36.4 (15 Param 2019 06:20), Max: 37.3 (14 Jun 2019 14:45)  T(F): 97.6 (15 Param 2019 06:20), Max: 99.2 (14 Jun 2019 14:45)  HR: 73 (15 Param 2019 06:20) (66 - 73)  BP: 142/77 (15 Param 2019 06:20) (120/65 - 142/77)  BP(mean): --  RR: 18 (15 Param 2019 06:20) (18 - 18)  SpO2: 100% (15 Param 2019 06:20) (100% - 100%)  CAPILLARY BLOOD GLUCOSE        I&O's Summary    PHYSICAL EXAM:  GENERAL: NAD, well-developed, AOx1  HEAD:  Atraumatic, Normocephalic  EYES: EOMI, PERRL, conjunctiva and sclera clear  NECK: Supple, No JVD  CHEST/LUNG: Clear to auscultation bilaterally  HEART: Regular rate and rhythm; No murmurs, rubs, or gallops, No Edema  ABDOMEN: Soft, Nontender, Nondistended; Bowel sounds present  EXTREMITIES:  2+ Peripheral Pulses, No clubbing, cyanosis  PSYCH: No SI/HI  NEUROLOGY: non-focal  SKIN: No rashes or lesions      LABS:                        10.2   10.38 )-----------( 176      ( 14 Jun 2019 06:00 )             34.4     06-14    141  |  104  |  13  ----------------------------<  93  3.6   |  26  |  0.85    Ca    8.7      14 Jun 2019 06:00    TPro  7.0  /  Alb  2.9<L>  /  TBili  0.6  /  DBili  x   /  AST  16  /  ALT  14  /  AlkPhos  86  06-14              Culture - Urine (collected 13 Jun 2019 01:01)  Source: URINE CATHETER  Final Report (14 Jun 2019 07:26):    NO GROWTH AT 24 HOURS        RADIOLOGY & ADDITIONAL TESTS:    Imaging Personally Reviewed:  Consultant(s) Notes Reviewed:    Care Discussed with Consultants/Other Providers:
Patient is a 89y old  Male who presents with a chief complaint of FTT (15 Param 2019 12:25)    SUBJECTIVE / OVERNIGHT EVENTS:  Patient seen denying any complaints.     MEDICATIONS  (STANDING):  amiodarone    Tablet 100 milliGRAM(s) Oral daily  apixaban 5 milliGRAM(s) Oral every 12 hours  atorvastatin 80 milliGRAM(s) Oral at bedtime  docusate sodium 100 milliGRAM(s) Oral two times a day  donepezil 10 milliGRAM(s) Oral at bedtime  folic acid 1 milliGRAM(s) Oral daily  metoprolol tartrate 100 milliGRAM(s) Oral two times a day  montelukast 10 milliGRAM(s) Oral daily  pantoprazole    Tablet 40 milliGRAM(s) Oral before breakfast  tamsulosin 0.4 milliGRAM(s) Oral at bedtime    MEDICATIONS  (PRN):      Vital Signs Last 24 Hrs  T(C): 36.9 (16 Jun 2019 06:05), Max: 37.1 (15 Param 2019 20:55)  T(F): 98.5 (16 Jun 2019 06:05), Max: 98.7 (15 Param 2019 20:55)  HR: 81 (16 Jun 2019 06:05) (66 - 81)  BP: 152/86 (16 Jun 2019 06:05) (116/60 - 152/86)  BP(mean): --  RR: 18 (16 Jun 2019 06:05) (18 - 18)  SpO2: 97% (16 Jun 2019 06:05) (97% - 100%)  CAPILLARY BLOOD GLUCOSE        I&O's Summary      PHYSICAL EXAM:  GENERAL: NAD, well-developed, AOx1  HEAD:  Atraumatic, Normocephalic  EYES: EOMI, PERRL, conjunctiva and sclera clear  NECK: Supple, No JVD  CHEST/LUNG: Clear to auscultation bilaterally  HEART: Regular rate and rhythm; No murmurs, rubs, or gallops, No Edema  ABDOMEN: Soft, Nontender, Nondistended; Bowel sounds present  EXTREMITIES:  2+ Peripheral Pulses, No clubbing, cyanosis  PSYCH: No SI/HI  NEUROLOGY: non-focal  SKIN: No rashes or lesions      RADIOLOGY & ADDITIONAL TESTS:    Imaging Personally Reviewed:  Consultant(s) Notes Reviewed:    Care Discussed with Consultants/Other Providers:
Patient is a 89y old  Male who presents with a chief complaint of FTT (16 Jun 2019 09:37)      SUBJECTIVE / OVERNIGHT EVENTS: patient seen and examined by bedside, no acute distress noted, denies headache, dizziness, SOB, CP, Palpitations , N/V/D, abdominal pain  pt ate breakfast well       MEDICATIONS  (STANDING):  amiodarone    Tablet 100 milliGRAM(s) Oral daily  apixaban 5 milliGRAM(s) Oral every 12 hours  atorvastatin 80 milliGRAM(s) Oral at bedtime  docusate sodium 100 milliGRAM(s) Oral two times a day  donepezil 10 milliGRAM(s) Oral at bedtime  folic acid 1 milliGRAM(s) Oral daily  metoprolol tartrate 100 milliGRAM(s) Oral two times a day  montelukast 10 milliGRAM(s) Oral daily  pantoprazole    Tablet 40 milliGRAM(s) Oral before breakfast  tamsulosin 0.4 milliGRAM(s) Oral at bedtime    MEDICATIONS  (PRN):      Vital Signs Last 24 Hrs  T(C): 36.8 (17 Jun 2019 06:26), Max: 36.8 (16 Jun 2019 17:20)  T(F): 98.2 (17 Jun 2019 06:26), Max: 98.2 (16 Jun 2019 17:20)  HR: 79 (17 Jun 2019 06:26) (70 - 79)  BP: 142/75 (17 Jun 2019 06:26) (123/76 - 142/82)  BP(mean): --  RR: 18 (17 Jun 2019 06:26) (17 - 18)  SpO2: 98% (17 Jun 2019 06:26) (97% - 98%)  CAPILLARY BLOOD GLUCOSE        I&O's Summary      PHYSICAL EXAM:  GENERAL: NAD, well-developed  HEAD:  Atraumatic, Normocephalic  EYES: EOMI, PERRLA, conjunctiva and sclera clear  NECK: Supple, No JVD  CHEST/LUNG: Clear to auscultation bilaterally; No wheeze, PPM + in left chest   HEART: Regular rate and rhythm;   ABDOMEN: Soft, Nontender, Nondistended; Bowel sounds present, palpable mass in lower abdomen   EXTREMITIES:  2+ Peripheral Pulses, No clubbing, cyanosis, or edema  PSYCH: AAOx1. calm   NEUROLOGY: non-focal      LABS:        no new labs             RADIOLOGY & ADDITIONAL TESTS:    Imaging Personally Reviewed:    Consultant(s) Notes Reviewed:      Care Discussed with Consultants/Other Providers:
Patient is a 89y old  Male who presents with a chief complaint of FTT (19 Jun 2019 12:22)      SUBJECTIVE / OVERNIGHT EVENTS: patient seen and examined by bedside, no acute distress noted  no acute events over night  denies headache, dizziness, SOB, CP, Palpitations , N/V/D, abdominal pain      MEDICATIONS  (STANDING):  amiodarone    Tablet 100 milliGRAM(s) Oral daily  apixaban 5 milliGRAM(s) Oral every 12 hours  atorvastatin 80 milliGRAM(s) Oral at bedtime  docusate sodium 100 milliGRAM(s) Oral two times a day  donepezil 10 milliGRAM(s) Oral at bedtime  folic acid 1 milliGRAM(s) Oral daily  furosemide    Tablet 20 milliGRAM(s) Oral <User Schedule>  metoprolol tartrate 100 milliGRAM(s) Oral two times a day  montelukast 10 milliGRAM(s) Oral daily  pantoprazole    Tablet 40 milliGRAM(s) Oral before breakfast  tamsulosin 0.4 milliGRAM(s) Oral at bedtime    MEDICATIONS  (PRN):      Vital Signs Last 24 Hrs  T(C): 37.2 (19 Jun 2019 14:44), Max: 37.2 (19 Jun 2019 14:44)  T(F): 98.9 (19 Jun 2019 14:44), Max: 98.9 (19 Jun 2019 14:44)  HR: 69 (19 Jun 2019 14:44) (66 - 79)  BP: 148/80 (19 Jun 2019 14:44) (124/62 - 148/80)  BP(mean): --  RR: 16 (19 Jun 2019 14:44) (16 - 19)  SpO2: 100% (19 Jun 2019 14:44) (98% - 100%)      PHYSICAL EXAM:  GENERAL: NAD, well-developed  HEAD:  Atraumatic, Normocephalic  EYES: left eye Ptosis, Pupils small but reacting to  light B/L  , poor vision B/L able to count fingers   conjunctiva and sclera clear  NECK: Supple,  CHEST/LUNG: Clear to auscultation bilaterally; No wheeze, PPM + in left chest   HEART: Regular rate and rhythm;   ABDOMEN: Soft, Nontender, Nondistended; Bowel sounds present, palpable mass in lower abdomen   EXTREMITIES:  2+ Peripheral Pulses, No clubbing, cyanosis, or edema  PSYCH: AAOx1, calm   NEUROLOGY: non-focal      LABS:        Free Thyroxine, Serum in AM (06.19.19 @ 05:47)    Free Thyroxine, Serum: 2.14 ng/dL                RADIOLOGY & ADDITIONAL TESTS:    Imaging Personally Reviewed:    Consultant(s) Notes Reviewed:      Care Discussed with Consultants/Other Providers:
Patient is a 89y old  Male who presents with a chief complaint of FTT (2019 12:07)      SUBJECTIVE / OVERNIGHT EVENTS: feels well, says wants to go home. per nursing finished tray this morning when fed with assistance, no n/v/d. remains afebrile MS at baseline     ROS:  No HA/DZ  No Vision changes   No CP, SOB  No N/V/D  No Edema  No Rash  NO weakness, numbness    MEDICATIONS  (STANDING):  amiodarone    Tablet 100 milliGRAM(s) Oral daily  apixaban 5 milliGRAM(s) Oral every 12 hours  atorvastatin 80 milliGRAM(s) Oral at bedtime  docusate sodium 100 milliGRAM(s) Oral two times a day  donepezil 10 milliGRAM(s) Oral at bedtime  folic acid 1 milliGRAM(s) Oral daily  metoprolol tartrate 100 milliGRAM(s) Oral two times a day  montelukast 10 milliGRAM(s) Oral daily  pantoprazole    Tablet 40 milliGRAM(s) Oral before breakfast  tamsulosin 0.4 milliGRAM(s) Oral at bedtime    MEDICATIONS  (PRN):      T(C): 37.1 (19 @ 06:25)  HR: 80 (19 @ 06:25)  BP: 139/80 (19 @ 06:25)  RR: 18 (19 @ 06:25)  SpO2: 100% (19 @ 06:25)  CAPILLARY BLOOD GLUCOSE        I&O's Summary      PHYSICAL EXAM:  GENERAL: NAD, well-developed, AOx1  HEAD:  Atraumatic, Normocephalic  EYES: EOMI, PERRL, conjunctiva and sclera clear  NECK: Supple, No JVD  CHEST/LUNG: Clear to auscultation bilaterally  HEART: Regular rate and rhythm; No murmurs, rubs, or gallops, No Edema  ABDOMEN: Soft, Nontender, Nondistended; Bowel sounds present  EXTREMITIES:  2+ Peripheral Pulses, No clubbing, cyanosis  PSYCH: No SI/HI  NEUROLOGY: non-focal  SKIN: No rashes or lesions    LABS:                        10.2   10.38 )-----------( 176      ( 2019 06:00 )             34.4     06-14    141  |  104  |  13  ----------------------------<  93  3.6   |  26  |  0.85    Ca    8.7      2019 06:00    TPro  7.0  /  Alb  2.9<L>  /  TBili  0.6  /  DBili  x   /  AST  16  /  ALT  14  /  AlkPhos  86  06-14      CARDIAC MARKERS ( 2019 21:25 )  x     / x     / 274 u/L / 1.44 ng/mL / x          Urinalysis Basic - ( 2019 23:03 )    Color: YELLOW / Appearance: CLEAR / S.019 / pH: 6.0  Gluc: NEGATIVE / Ketone: NEGATIVE  / Bili: NEGATIVE / Urobili: SMALL   Blood: SMALL / Protein: 50 / Nitrite: NEGATIVE   Leuk Esterase: NEGATIVE / RBC: 6-10 / WBC 0-2   Sq Epi: FEW / Non Sq Epi: x / Bacteria: NEGATIVE            RADIOLOGY & ADDITIONAL TESTS:    Imaging Personally Reviewed:    Consultant(s) Notes Reviewed:      Care Discussed with Consultants/Other Providers:
Patient is a 89y old  Male who presents with a chief complaint of FTT (17 Jun 2019 14:15)      SUBJECTIVE / OVERNIGHT EVENTS:    MEDICATIONS  (STANDING):  amiodarone    Tablet 100 milliGRAM(s) Oral daily  apixaban 5 milliGRAM(s) Oral every 12 hours  atorvastatin 80 milliGRAM(s) Oral at bedtime  docusate sodium 100 milliGRAM(s) Oral two times a day  donepezil 10 milliGRAM(s) Oral at bedtime  folic acid 1 milliGRAM(s) Oral daily  metoprolol tartrate 100 milliGRAM(s) Oral two times a day  montelukast 10 milliGRAM(s) Oral daily  pantoprazole    Tablet 40 milliGRAM(s) Oral before breakfast  tamsulosin 0.4 milliGRAM(s) Oral at bedtime    MEDICATIONS  (PRN):      Vital Signs Last 24 Hrs  T(C): 36.8 (18 Jun 2019 12:16), Max: 36.9 (17 Jun 2019 21:44)  T(F): 98.2 (18 Jun 2019 12:16), Max: 98.5 (17 Jun 2019 21:44)  HR: 72 (18 Jun 2019 12:16) (61 - 72)  BP: 118/61 (18 Jun 2019 12:16) (118/61 - 145/69)  BP(mean): --  RR: 18 (18 Jun 2019 12:16) (17 - 18)  SpO2: 100% (18 Jun 2019 12:16) (100% - 100%)  CAPILLARY BLOOD GLUCOSE        I&O's Summary    PHYSICAL EXAM:  GENERAL: NAD, well-developed  HEAD:  Atraumatic, Normocephalic  EYES: left eye Ptosis, Pupils constricted, not reacting to light B/L  , poor vision B/L  conjunctiva and sclera clear  NECK: Supple,  CHEST/LUNG: Clear to auscultation bilaterally; No wheeze, PPM + in left chest   HEART: Regular rate and rhythm;   ABDOMEN: Soft, Nontender, Nondistended; Bowel sounds present, palpable mass in lower abdomen   EXTREMITIES:  2+ Peripheral Pulses, No clubbing, cyanosis, or edema  PSYCH: AAOx1. calm   NEUROLOGY: non-focal        LABS:            Free Thyroxine, Serum: 2.10 ng/dL (06.14.19 @ 06:00)    Thyroid Stimulating Hormone, Serum: 1.08 uIU/mL (06.14.19 @ 06:00)            RADIOLOGY & ADDITIONAL TESTS:    Imaging Personally Reviewed:    Consultant(s) Notes Reviewed:  Endo    Care Discussed with Consultants/Other Providers:

## 2019-06-19 NOTE — PROGRESS NOTE ADULT - PROBLEM SELECTOR PLAN 4
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 Per uro outpt notes was scheduled for final dose this month.  -Palpable mass in bladder region. Will order bladder scan to ensure patient not retaining urine. out-pt  follow up
-c/w home montelukast
Suggest to continue medications, monitoring, FU primary team recommendations. .
Suggest to continue medications, monitoring, FU primary team recommendations. .
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 Per uro outpt notes , pt completed treatment

## 2019-06-19 NOTE — PROGRESS NOTE ADULT - PROBLEM SELECTOR PLAN 3
TSH wnl , FT4 elevated, likely secondary to Amiodarone  Endo eval appreciated, recommend repeat TFT in 4 weeks   pt asymptomatic
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 Per uro outpt notes was scheduled for final dose this month.  -Palpable mass in bladder region. Will order bladder scan to ensure patient not retaining urine. out-pt  follow up
Continue care, primary team FU
Continue care, primary team FU
TSH wnl , FT4 elevated, likely secondary to Amiodarone  Endo eval appreciated, recommend repeat TFT in 4 weeks , repeat Ft4 still elevated, F/U Ab as recommended by Catherine   Endo recommend Thyroid US, can be done as outpt as pt asymptomatic at this time
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 Per uro outpt notes was scheduled for final dose this month.  -Palpable mass in bladder region. Will order bladder scan to ensure patient not retaining urine. out-pt  follow up

## 2019-06-19 NOTE — PROGRESS NOTE ADULT - PROBLEM SELECTOR PROBLEM 2
Atrial fibrillation
Heart failure with preserved ejection fraction
Heart failure with preserved ejection fraction
Atrial fibrillation

## 2019-06-19 NOTE — PROGRESS NOTE ADULT - PROBLEM SELECTOR PLAN 6
-on furosemide  20 mg every other day. will  resume as needed   -euvolemic on exam. CXR clear. HST 45-->43
-c/w home donepezil
-c/w home donepezil  - supportive  care
-on furosemide  20 mg every other day. will  resume twice a week   -euvolemic on exam. CXR clear. HST 45-->43

## 2019-06-19 NOTE — PROGRESS NOTE ADULT - PROBLEM SELECTOR PROBLEM 8
CVA (cerebral vascular accident)
GERD (gastroesophageal reflux disease)
CVA (cerebral vascular accident)
GERD (gastroesophageal reflux disease)

## 2019-06-19 NOTE — PROGRESS NOTE ADULT - PROBLEM SELECTOR PLAN 1
-decreased PO intake, weakness, 20 pound weight loss, lack of appetite  -unclear if due to underlying malignancy vs other cause (though no known illnesses, medication changes, electrolyte changes) vs worsening dementia  -PT consult, NEVAEH estrada, nutrition consult
- multifactorial, likely to worsening dementia and underlying malignancy  - adequate po here with assistance, stable lytes, renal fx  - nutrition eval
- multifactorial, likely to worsening dementia and underlying malignancy  - adequate po here with assistance, stable lytes, renal fx  - nutrition eval recommending supplementation with Ensure® Enlive®
Multifactorial, likely to worsening dementia and underlying malignancy  - adequate po here with assistance, stable lytes, renal fx  - nutrition eval recommending supplementation with Ensure® Enlive®
Will FU with lab results.  Will get US thyroid to R/O Goiter.
Will get TSHr Ab, TPO Ab, repeat TFTs and FU
Multifactorial, likely to worsening dementia and underlying malignancy  - adequate po here with assistance, stable lytes, renal fx  - nutrition eval recommending supplementation with Ensure® Enlive®

## 2019-06-19 NOTE — PROGRESS NOTE ADULT - ASSESSMENT
The patient is a 89 year old male with a PMHx of A. 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
patient seen and chart reviewd, full note to follow.  Assessment  Hyperthyroidism:: 89y Male with no history of hyperthyroidism not on any thyroid supplements he is on amiodarone now, likely causing elevated Free T4, , denies palpitations, no neck pain. awaiting placement.  Afib/CAD: on medications, no chest pain, stable, monitored.  Dementia/Cva: on meds, stable.      James Archuleta MD  Cell: [phone]  Office: [phone]
patient seen and chart reviewd, full note to follow.  Assessment  Hyperthyroidism:: 89y Male with no history of hyperthyroidism not on any thyroid supplements he is on amiodarone now, likely causing elevated Free T4, , repeat T4 still high, denies palpitations, no neck pain. awaiting placement.  Afib/CAD: on medications, no chest pain, stable, monitored.  Dementia/Cva: on meds, stable.      James Archuleta MD  Cell: [phone]  Office: [phone]
The patient is a 89 year old male with a PMHx of A. 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

## 2019-06-19 NOTE — PROGRESS NOTE ADULT - PROBLEM SELECTOR PLAN 8
-c/w home atorvastatin and ASA  - s/p PPM interrogation, normal device function
-c/w home pantoprazole
-c/w home atorvastatin and ASA  - s/p PPM interrogation, normal device function
-c/w home pantoprazole

## 2019-06-20 LAB — 24R-OH-CALCIDIOL SERPL-MCNC: 22.8 NG/ML — LOW (ref 30–80)

## 2019-06-21 LAB
TSH RECEP AB FLD-ACNC: 0.63 IU/L — SIGNIFICANT CHANGE UP (ref 0–1.75)
TSH RECEP AB FLD-ACNC: <0.5 IU/L — SIGNIFICANT CHANGE UP (ref 0–1.75)

## 2020-03-17 NOTE — ED PROVIDER NOTE - NEUROLOGICAL SYMPTOMS OF STROKE
"Requested Prescriptions   Pending Prescriptions Disp Refills     levothyroxine (SYNTHROID/LEVOTHROID) 75 MCG tablet [Pharmacy Med Name: LEVOTHYROXINE SODIUM 75MCG TABS] 90 tablet 0     Sig: TAKE ONE TABLET BY MOUTH EVERY MORNING (BEFORE BREAKFAST)   Last Written Prescription Date:  1/3/20  Last Fill Quantity: 90,  # refills: 0   Last office visit: 1/24/2019 with prescribing provider:     Future Office Visit:   Next 5 appointments (look out 90 days)    Apr 22, 2020 10:30 AM CDT  Nurse Only with MG NURSE ONLY ORTHO  [facility] ([facility]) [address] [place] [id number]  [phone]   May 26, 2020 10:30 AM CDT  Return Visit with Edward Schmid MD  [facility] ([facility]) [address] [place] [id number]  [phone]             Thyroid Protocol Failed - 3/17/2020  9:25 AM        Failed - Normal TSH on file in past 12 months     Recent Labs   Lab Test 03/13/20  1125   TSH 0.27*              Passed - Patient is 12 years or older        Passed - Recent (12 mo) or future (30 days) visit within the authorizing provider's specialty     Patient has had an office visit with the authorizing provider or a provider within the authorizing providers department within the previous 12 mos or has a future within next 30 days. See \"Patient Info\" tab in inbasket, or \"Choose Columns\" in Meds & Orders section of the refill encounter.              Passed - Medication is active on med list        Passed - No active pregnancy on record     If patient is pregnant or has had a positive pregnancy test, please check TSH.          Passed - No positive pregnancy test in past 12 months     If patient is pregnant or has had a positive pregnancy test, please check TSH.             Azar Mosher , [facility]  03/17/20 1:18 PM     " decreased ability to stand/walk/numbness

## 2020-09-09 NOTE — ED ADULT NURSE NOTE - CHIEF COMPLAINT QUOTE
Ivette is calling from [facility] and needs a call back regarding the Creatinine    sent in by pmd to check urinary stent. as per family, pt states pt was hematuric last week and sent to hospital to check stent placement.

## 2020-11-19 NOTE — ED PROVIDER NOTE - CONSTITUTIONAL NEGATIVE STATEMENT, MLM
Our office receive a service to internal medicine request to get the patient schedule for a consult for surgery clearance. The request is missing the location of the surgery, the type of anesthesia that will be used, and the date of the surgery. Once the missing information is update in the service to internal medicine request, our office will reach out to the patient to get her scheduled for a consult. Thank you.  
The order will need to be placed again once the patient has been scheduled for surgery. Please include the date of the surgery, location of the surgery, type of anesthesia that will be used during surgery, and all labs/imaging that will be needed to clear the patient for surgery. Thank you.  
no fever and no chills.

## 2020-12-16 PROBLEM — Z87.440 HISTORY OF URINARY TRACT INFECTION: Status: RESOLVED | Noted: 2018-09-21 | Resolved: 2020-12-16

## 2022-02-18 NOTE — ED PROVIDER NOTE - OBJECTIVE STATEMENT
Action 3: Continue Detail Level: Zone Other Instructions: pt prefers to use baby oil on his lower legs Increase Regimen: moisturize body 1-3 times daily Plan: sugessted he use thick creams on his lower legs, \\noffred to efer him to plastics or vascular, he declines today 89yo M hx of afib on eliquis, sp penile implant and pump 15 years ago pw hematuria and chills x 3 days. Pt states he has been on abx for several days and was sent in by uro for stent removal. Denies purulent discharge, nausea, vomiting, cp, sob, fever, cough, rhinorrhea, dysuria Initiate Treatment: Sunscreen (SPF 30 or greater) should be applied every 1.5-2 hours, during peak UV exposure (between 10am and 2pm) and reapplied after exercise or swimming. Plan: The ABCDEs of melanoma were reviewed with the patient, and the importance of monthly self-examination of moles was emphasized. Should any moles change in shape or color, or itch, bleed or burn, patient will contact our office for evaluation sooner than their interval appointment.\\nSun protective clothing is also effective at protecting against UV rays that cause skin cancer.\\n

## 2022-03-23 NOTE — ED ADULT TRIAGE NOTE - PAIN: PRESENCE, MLM
tamsulosin refill:  Last filled:  2/15/2022  Last seen: 3/7/2022  Follow up appointment: None listed     Plan:  1. Continue hydration  2. Continue flomax  3. Continue to strain urine- call if stone passes  4. If no stone passage in 2 weeks, repeat kub   5. Metabolic stone disease workup once stone episode resolves.      LANDON Diez       non-verbal indicators of pain/discomfort absent denies pain/discomfort

## 2022-05-13 NOTE — ED ADULT NURSE NOTE - PERSON CONFIRMING BED ASSIGNMENT
Alert-The patient is alert, awake and responds to voice. The patient is oriented to time, place, and person. The triage nurse is able to obtain subjective information.
charge rn

## 2023-02-26 NOTE — H&P PST ADULT - ANTERIOR CERVICAL L
Patient is A&Ox4, in NAD. Presents to ED s/p trip and fall outside of his house this afternoon. Denies LOC or head strike. patient c/o left hip pain. LLE is shortened and externally rotated. Medicated for pain as per orders. Pending x-ray. ED workup in progress. Will continue to monitor.
normal
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
## 2023-10-03 NOTE — BRIEF OPERATIVE NOTE - PRE-OP DX
"Received a message from [facility] scheduling that Vincent would like to schedule his next Ocrevus infusion and new orders are needed. Last infused on 9/25 so next infusion is due in March, a \"target\" date of 3/25. New orders pended to Dr. Morillo. Once signed. [facility] scheduling team will be notified.    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

## 2023-10-25 NOTE — H&P ADULT - PROBLEM SELECTOR PROBLEM 6
Patient returned writer's call. Schedule IP procedure 01/12 with Dr. Ross. Transplant team to address H&P. Packet information sent via [employer] per patient request.    Aidan Jacinto on 10/25/2023 at 11:11 AM     Dementia

## 2024-02-29 NOTE — ED ADULT NURSE NOTE - OTHER COMPLAINTS
Solaraze Counseling:  I discussed with the patient the risks of Solaraze including but not limited to erythema, scaling, itching, weeping, crusting, and pain. Enbrel Pregnancy And Lactation Text: This medication is Pregnancy Category B and is considered safe during pregnancy. It is unknown if this medication is excreted in breast milk. Odomzo Pregnancy And Lactation Text: This medication is Pregnancy Category X and is absolutely contraindicated during pregnancy. It is unknown if it is excreted in breast milk. Clindamycin Pregnancy And Lactation Text: This medication can be used in pregnancy if certain situations. Clindamycin is also present in breast milk. Opioid Counseling: I discussed with the patient the potential side effects of opioids including but not limited to addiction, altered mental status, and depression. I stressed avoiding alcohol, benzodiazepines, muscle relaxants and sleep aids unless specifically okayed by a physician. The patient verbalized understanding of the proper use and possible adverse effects of opioids. All of the patient's questions and concerns were addressed. They were instructed to flush the remaining pills down the toilet if they did not need them for pain. pt taking bill for afib Tremfya Counseling: I discussed with the patient the risks of guselkumab including but not limited to immunosuppression, serious infections, and drug reactions.  The patient understands that monitoring is required including a PPD at baseline and must alert us or the primary physician if symptoms of infection or other concerning signs are noted. Doxepin Pregnancy And Lactation Text: This medication is Pregnancy Category C and it isn't known if it is safe during pregnancy. It is also excreted in breast milk and breast feeding isn't recommended. Spironolactone Counseling: Patient advised regarding risks of diarrhea, abdominal pain, hyperkalemia, birth defects (for female patients), liver toxicity and renal toxicity. The patient may need blood work to monitor liver and kidney function and potassium levels while on therapy. The patient verbalized understanding of the proper use and possible adverse effects of spironolactone.  All of the patient's questions and concerns were addressed. Xelnataliz Pregnancy And Lactation Text: This medication is Pregnancy Category D and is not considered safe during pregnancy.  The risk during breast feeding is also uncertain. Topical Ketoconazole Counseling: Patient counseled that this medication may cause skin irritation or allergic reactions.  In the event of skin irritation, the patient was advised to reduce the amount of the drug applied or use it less frequently.   The patient verbalized understanding of the proper use and possible adverse effects of ketoconazole.  All of the patient's questions and concerns were addressed. Griseofulvin Counseling:  I discussed with the patient the risks of griseofulvin including but not limited to photosensitivity, cytopenia, liver damage, nausea/vomiting and severe allergy.  The patient understands that this medication is best absorbed when taken with a fatty meal (e.g., ice cream or french fries). Litfulo Counseling: I discussed with the patient the risks of Litfulo therapy including but not limited to upper respiratory tract infections, shingles, cold sores, and nausea. Live vaccines should be avoided.  This medication has been linked to serious infections; higher rate of mortality; malignancy and lymphoproliferative disorders; major adverse cardiovascular events; thrombosis; gastrointestinal perforations; neutropenia; lymphopenia; anemia; liver enzyme elevations; and lipid elevations. Eucrisa Pregnancy And Lactation Text: This medication has not been assigned a Pregnancy Risk Category but animal studies failed to show danger with the topical medication. It is unknown if the medication is excreted in breast milk. Niacinamide Pregnancy And Lactation Text: These medications are considered safe during pregnancy. Cyclophosphamide Pregnancy And Lactation Text: This medication is Pregnancy Category D and it isn't considered safe during pregnancy. This medication is excreted in breast milk. Mirvaso Pregnancy And Lactation Text: This medication has not been assigned a Pregnancy Risk Category. It is unknown if the medication is excreted in breast milk. Siliq Pregnancy And Lactation Text: The risk during pregnancy and breastfeeding is uncertain with this medication. Tranexamic Acid Counseling:  Patient advised of the small risk of bleeding problems with tranexamic acid. They were also instructed to call if they developed any nausea, vomiting or diarrhea. All of the patient's questions and concerns were addressed. Gabapentin Counseling: I discussed with the patient the risks of gabapentin including but not limited to dizziness, somnolence, fatigue and ataxia. Bimzelx Counseling:  I discussed with the patient the risks of Bimzelx including but not limited to depression, immunosuppression, allergic reactions and infections.  The patient understands that monitoring is required including a PPD at baseline and must alert us or the primary physician if symptoms of infection or other concerning signs are noted. Quinolones Pregnancy And Lactation Text: This medication is Pregnancy Category C and it isn't know if it is safe during pregnancy. It is also excreted in breast milk. Opioid Pregnancy And Lactation Text: These medications can lead to premature delivery and should be avoided during pregnancy. These medications are also present in breast milk in small amounts. Winlevi Counseling:  I discussed with the patient the risks of topical clascoterone including but not limited to erythema, scaling, itching, and stinging. Patient voiced their understanding. Spironolactone Pregnancy And Lactation Text: This medication can cause feminization of the male fetus and should be avoided during pregnancy. The active metabolite is also found in breast milk. Solaraze Pregnancy And Lactation Text: This medication is Pregnancy Category B and is considered safe. There is some data to suggest avoiding during the third trimester. It is unknown if this medication is excreted in breast milk. Calcipotriene Counseling:  I discussed with the patient the risks of calcipotriene including but not limited to erythema, scaling, itching, and irritation. Doxycycline Counseling:  Patient counseled regarding possible photosensitivity and increased risk for sunburn.  Patient instructed to avoid sunlight, if possible.  When exposed to sunlight, patients should wear protective clothing, sunglasses, and sunscreen.  The patient was instructed to call the office immediately if the following severe adverse effects occur:  hearing changes, easy bruising/bleeding, severe headache, or vision changes.  The patient verbalized understanding of the proper use and possible adverse effects of doxycycline.  All of the patient's questions and concerns were addressed. Hydroxyzine Counseling: Patient advised that the medication is sedating and not to drive a car after taking this medication.  Patient informed of potential adverse effects including but not limited to dry mouth, urinary retention, and blurry vision.  The patient verbalized understanding of the proper use and possible adverse effects of hydroxyzine.  All of the patient's questions and concerns were addressed. Humira Counseling:  I discussed with the patient the risks of adalimumab including but not limited to myelosuppression, immunosuppression, autoimmune hepatitis, demyelinating diseases, lymphoma, and serious infections.  The patient understands that monitoring is required including a PPD at baseline and must alert us or the primary physician if symptoms of infection or other concerning signs are noted. Aklief counseling:  Patient advised to apply a pea-sized amount only at bedtime and wait 30 minutes after washing their face before applying.  If too drying, patient may add a non-comedogenic moisturizer.  The most commonly reported side effects including irritation, redness, scaling, dryness, stinging, burning, itching, and increased risk of sunburn.  The patient verbalized understanding of the proper use and possible adverse effects of retinoids.  All of the patient's questions and concerns were addressed. Calcipotriene Pregnancy And Lactation Text: The use of this medication during pregnancy or lactation is not recommended as there is insufficient data. Nsaids Counseling: NSAID Counseling: I discussed with the patient that NSAIDs should be taken with food. Prolonged use of NSAIDs can result in the development of stomach ulcers.  Patient advised to stop taking NSAIDs if abdominal pain occurs.  The patient verbalized understanding of the proper use and possible adverse effects of NSAIDs.  All of the patient's questions and concerns were addressed. Topical Ketoconazole Pregnancy And Lactation Text: This medication is Pregnancy Category B and is considered safe during pregnancy. It is unknown if it is excreted in breast milk. Hydroquinone Counseling:  Patient advised that medication may result in skin irritation, lightening (hypopigmentation), dryness, and burning.  In the event of skin irritation, the patient was advised to reduce the amount of the drug applied or use it less frequently.  Rarely, spots that are treated with hydroquinone can become darker (pseudoochronosis).  Should this occur, patient instructed to stop medication and call the office. The patient verbalized understanding of the proper use and possible adverse effects of hydroquinone.  All of the patient's questions and concerns were addressed. Acitretin Counseling:  I discussed with the patient the risks of acitretin including but not limited to hair loss, dry lips/skin/eyes, liver damage, hyperlipidemia, depression/suicidal ideation, photosensitivity.  Serious rare side effects can include but are not limited to pancreatitis, pseudotumor cerebri, bony changes, clot formation/stroke/heart attack.  Patient understands that alcohol is contraindicated since it can result in liver toxicity and significantly prolong the elimination of the drug by many years. Rifampin Counseling: I discussed with the patient the risks of rifampin including but not limited to liver damage, kidney damage, red-orange body fluids, nausea/vomiting and severe allergy. Griseofulvin Pregnancy And Lactation Text: This medication is Pregnancy Category X and is known to cause serious birth defects. It is unknown if this medication is excreted in breast milk but breast feeding should be avoided. Cyclosporine Counseling:  I discussed with the patient the risks of cyclosporine including but not limited to hypertension, gingival hyperplasia,myelosuppression, immunosuppression, liver damage, kidney damage, neurotoxicity, lymphoma, and serious infections. The patient understands that monitoring is required including baseline blood pressure, CBC, CMP, lipid panel and uric acid, and then 1-2 times monthly CMP and blood pressure. Simponi Counseling:  I discussed with the patient the risks of golimumab including but not limited to myelosuppression, immunosuppression, autoimmune hepatitis, demyelinating diseases, lymphoma, and serious infections.  The patient understands that monitoring is required including a PPD at baseline and must alert us or the primary physician if symptoms of infection or other concerning signs are noted. Bimzelx Pregnancy And Lactation Text: This medication crosses the placenta and the safety is uncertain during pregnancy. It is unknown if this medication is present in breast milk. Cantharidin Counseling:  I discussed with the patient the risks of Cantharidin including but not limited to pain, redness, burning, itching, and blistering. Dutasteride Male Counseling: Dustasteride Counseling:  I discussed with the patient the risks of use of dutasteride including but not limited to decreased libido, decreased ejaculate volume, and gynecomastia. Women who can become pregnant should not handle medication.  All of the patient's questions and concerns were addressed. Tranexamic Acid Pregnancy And Lactation Text: It is unknown if this medication is safe during pregnancy or breast feeding. Litfulo Pregnancy And Lactation Text: Based on animal studies, Lifulo may cause embryo-fetal harm when administered to pregnant women.  The medication should not be used in pregnancy.  Breastfeeding is not recommended during treatment. Doxycycline Pregnancy And Lactation Text: This medication is Pregnancy Category D and not consider safe during pregnancy. It is also excreted in breast milk but is considered safe for shorter treatment courses. Winlevi Pregnancy And Lactation Text: This medication is considered safe during pregnancy and breastfeeding. Opzelura Counseling:  I discussed with the patient the risks of Opzelura including but not limited to nasopharngitis, bronchitis, ear infection, eosinophila, hives, diarrhea, folliculitis, tonsillitis, and rhinorrhea.  Taken orally, this medication has been linked to serious infections; higher rate of mortality; malignancy and lymphoproliferative disorders; major adverse cardiovascular events; thrombosis; thrombocytopenia, anemia, and neutropenia; and lipid elevations. Soolantra Counseling: I discussed with the patients the risks of topial Soolantra. This is a medicine which decreases the number of mites and inflammation in the skin. You experience burning, stinging, eye irritation or allergic reactions.  Please call our office if you develop any problems from using this medication. Hydroxyzine Pregnancy And Lactation Text: This medication is not safe during pregnancy and should not be taken. It is also excreted in breast milk and breast feeding isn't recommended. Aklief Pregnancy And Lactation Text: It is unknown if this medication is safe to use during pregnancy.  It is unknown if this medication is excreted in breast milk.  Breastfeeding women should use the topical cream on the smallest area of the skin for the shortest time needed while breastfeeding.  Do not apply to nipple and areola. Xolair Counseling:  Patient informed of potential adverse effects including but not limited to fever, muscle aches, rash and allergic reactions.  The patient verbalized understanding of the proper use and possible adverse effects of Xolair.  All of the patient's questions and concerns were addressed. Gabapentin Pregnancy And Lactation Text: This medication is Pregnancy Category C and isn't considered safe during pregnancy. It is excreted in breast milk. Picato Pregnancy And Lactation Text: This medication is Pregnancy Category C. It is unknown if this medication is excreted in breast milk. Oxybutynin Counseling:  I discussed with the patient the risks of oxybutynin including but not limited to skin rash, drowsiness, dry mouth, difficulty urinating, and blurred vision. Azithromycin Counseling:  I discussed with the patient the risks of azithromycin including but not limited to GI upset, allergic reaction, drug rash, diarrhea, and yeast infections. Arava Counseling:  Patient counseled regarding adverse effects of Arava including but not limited to nausea, vomiting, abnormalities in liver function tests. Patients may develop mouth sores, rash, diarrhea, and abnormalities in blood counts. The patient understands that monitoring is required including LFTs and blood counts.  There is a rare possibility of scarring of the liver and lung problems that can occur when taking methotrexate. Persistent nausea, loss of appetite, pale stools, dark urine, cough, and shortness of breath should be reported immediately. Patient advised to discontinue Arava treatment and consult with a physician prior to attempting conception. The patient will have to undergo a treatment to eliminate Arava from the body prior to conception. Rifampin Pregnancy And Lactation Text: This medication is Pregnancy Category C and it isn't know if it is safe during pregnancy. It is also excreted in breast milk and should not be used if you are breast feeding. Nsaids Pregnancy And Lactation Text: These medications are considered safe up to 30 weeks gestation. It is excreted in breast milk. Azathioprine Counseling:  I discussed with the patient the risks of azathioprine including but not limited to myelosuppression, immunosuppression, hepatotoxicity, lymphoma, and infections.  The patient understands that monitoring is required including baseline LFTs, Creatinine, possible TPMP genotyping and weekly CBCs for the first month and then every 2 weeks thereafter.  The patient verbalized understanding of the proper use and possible adverse effects of azathioprine.  All of the patient's questions and concerns were addressed. Acitretin Pregnancy And Lactation Text: This medication is Pregnancy Category X and should not be given to women who are pregnant or may become pregnant in the future. This medication is excreted in breast milk. Cantharidin Pregnancy And Lactation Text: This medication has not been proven safe during pregnancy. It is unknown if this medication is excreted in breast milk. Itraconazole Counseling:  I discussed with the patient the risks of itraconazole including but not limited to liver damage, nausea/vomiting, neuropathy, and severe allergy.  The patient understands that this medication is best absorbed when taken with acidic beverages such as non-diet cola or ginger ale.  The patient understands that monitoring is required including baseline LFTs and repeat LFTs at intervals.  The patient understands that they are to contact us or the primary physician if concerning signs are noted. Topical Metronidazole Counseling: Metronidazole is a topical antibiotic medication. You may experience burning, stinging, redness, or allergic reactions.  Please call our office if you develop any problems from using this medication. Dutasteride Female Counseling: Dutasteride Counseling:  I discussed with the patient the risks of use of dutasteride including but not limited to decreased libido and sexual dysfunction. Explained the teratogenic nature of the medication and stressed the importance of not getting pregnant during treatment. All of the patient's questions and concerns were addressed. Opzelura Pregnancy And Lactation Text: There is insufficient data to evaluate drug-associated risk for major birth defects, miscarriage, or other adverse maternal or fetal outcomes.  There is a pregnancy registry that monitors pregnancy outcomes in pregnant persons exposed to the medication during pregnancy.  It is unknown if this medication is excreted in breast milk.  Do not breastfeed during treatment and for about 4 weeks after the last dose. Cimzia Counseling:  I discussed with the patient the risks of Cimzia including but not limited to immunosuppression, allergic reactions and infections.  The patient understands that monitoring is required including a PPD at baseline and must alert us or the primary physician if symptoms of infection or other concerning signs are noted. Olumiant Counseling: I discussed with the patient the risks of Olumiant therapy including but not limited to upper respiratory tract infections, shingles, cold sores, and nausea. Live vaccines should be avoided.  This medication has been linked to serious infections; higher rate of mortality; malignancy and lymphoproliferative disorders; major adverse cardiovascular events; thrombosis; gastrointestinal perforations; neutropenia; lymphopenia; anemia; liver enzyme elevations; and lipid elevations. 5-Fu Counseling: 5-Fluorouracil Counseling:  I discussed with the patient the risks of 5-fluorouracil including but not limited to erythema, scaling, itching, weeping, crusting, and pain. Cyclosporine Pregnancy And Lactation Text: This medication is Pregnancy Category C and it isn't know if it is safe during pregnancy. This medication is excreted in breast milk. VTAMA Counseling: I discussed with the patient that VTAMA is not for use in the eyes, mouth or mouth. They should call the office if they develop any signs of allergic reactions to VTAMA. The patient verbalized understanding of the proper use and possible adverse effects of VTAMA.  All of the patient's questions and concerns were addressed. Valtrex Counseling: I discussed with the patient the risks of valacyclovir including but not limited to kidney damage, nausea, vomiting and severe allergy.  The patient understands that if the infection seems to be worsening or is not improving, they are to call. Glycopyrrolate Counseling:  I discussed with the patient the risks of glycopyrrolate including but not limited to skin rash, drowsiness, dry mouth, difficulty urinating, and blurred vision. Soolantra Pregnancy And Lactation Text: This medication is Pregnancy Category C. This medication is considered safe during breast feeding. Xolair Pregnancy And Lactation Text: This medication is Pregnancy Category B and is considered safe during pregnancy. This medication is excreted in breast milk. Erythromycin Counseling:  I discussed with the patient the risks of erythromycin including but not limited to GI upset, allergic reaction, drug rash, diarrhea, increase in liver enzymes, and yeast infections. Azelaic Acid Counseling: Patient counseled that medicine may cause skin irritation and to avoid applying near the eyes.  In the event of skin irritation, the patient was advised to reduce the amount of the drug applied or use it less frequently.   The patient verbalized understanding of the proper use and possible adverse effects of azelaic acid.  All of the patient's questions and concerns were addressed. Olanzapine Counseling- I discussed with the patient the common side effects of olanzapine including but are not limited to: lack of energy, dry mouth, increased appetite, sleepiness, tremor, constipation, dizziness, changes in behavior, or restlessness.  Explained that teenagers are more likely to experience headaches, abdominal pain, pain in the arms or legs, tiredness, and sleepiness.  Serious side effects include but are not limited: increased risk of death in elderly patients who are confused, have memory loss, or dementia-related psychosis; hyperglycemia; increased cholesterol and triglycerides; and weight gain. Ilumya Counseling: I discussed with the patient the risks of tildrakizumab including but not limited to immunosuppression, malignancy, posterior leukoencephalopathy syndrome, and serious infections.  The patient understands that monitoring is required including a PPD at baseline and must alert us or the primary physician if symptoms of infection or other concerning signs are noted. Imiquimod Counseling:  I discussed with the patient the risks of imiquimod including but not limited to erythema, scaling, itching, weeping, crusting, and pain.  Patient understands that the inflammatory response to imiquimod is variable from person to person and was educated regarded proper titration schedule.  If flu-like symptoms develop, patient knows to discontinue the medication and contact us. Protopic Counseling: Patient may experience a mild burning sensation during topical application. Protopic is not approved in children less than 2 years of age. There have been case reports of hematologic and skin malignancies in patients using topical calcineurin inhibitors although causality is questionable. Azithromycin Pregnancy And Lactation Text: This medication is considered safe during pregnancy and is also secreted in breast milk. Albendazole Counseling:  I discussed with the patient the risks of albendazole including but not limited to cytopenia, kidney damage, nausea/vomiting and severe allergy.  The patient understands that this medication is being used in an off-label manner. Methotrexate Counseling:  Patient counseled regarding adverse effects of methotrexate including but not limited to nausea, vomiting, abnormalities in liver function tests. Patients may develop mouth sores, rash, diarrhea, and abnormalities in blood counts. The patient understands that monitoring is required including LFT's and blood counts.  There is a rare possibility of scarring of the liver and lung problems that can occur when taking methotrexate. Persistent nausea, loss of appetite, pale stools, dark urine, cough, and shortness of breath should be reported immediately. Patient advised to discontinue methotrexate treatment at least three months before attempting to become pregnant.  I discussed the need for folate supplements while taking methotrexate.  These supplements can decrease side effects during methotrexate treatment. The patient verbalized understanding of the proper use and possible adverse effects of methotrexate.  All of the patient's questions and concerns were addressed. Cimzia Pregnancy And Lactation Text: This medication crosses the placenta but can be considered safe in certain situations. Cimzia may be excreted in breast milk. Topical Metronidazole Pregnancy And Lactation Text: This medication is Pregnancy Category B and considered safe during pregnancy.  It is also considered safe to use while breastfeeding. Skyrizi Counseling: I discussed with the patient the risks of risankizumab-rzaa including but not limited to immunosuppression, and serious infections.  The patient understands that monitoring is required including a PPD at baseline and must alert us or the primary physician if symptoms of infection or other concerning signs are noted. Bexarotene Counseling:  I discussed with the patient the risks of bexarotene including but not limited to hair loss, dry lips/skin/eyes, liver abnormalities, hyperlipidemia, pancreatitis, depression/suicidal ideation, photosensitivity, drug rash/allergic reactions, hypothyroidism, anemia, leukopenia, infection, cataracts, and teratogenicity.  Patient understands that they will need regular blood tests to check lipid profile, liver function tests, white blood cell count, thyroid function tests and pregnancy test if applicable. Picato Counseling:  I discussed with the patient the risks of Picato including but not limited to erythema, scaling, itching, weeping, crusting, and pain. Sarecycline Counseling: Patient advised regarding possible photosensitivity and discoloration of the teeth, skin, lips, tongue and gums.  Patient instructed to avoid sunlight, if possible.  When exposed to sunlight, patients should wear protective clothing, sunglasses, and sunscreen.  The patient was instructed to call the office immediately if the following severe adverse effects occur:  hearing changes, easy bruising/bleeding, severe headache, or vision changes.  The patient verbalized understanding of the proper use and possible adverse effects of sarecycline.  All of the patient's questions and concerns were addressed. Olumiant Pregnancy And Lactation Text: Based on animal studies, Olumiant may cause embryo-fetal harm when administered to pregnant women.  The medication should not be used in pregnancy.  Breastfeeding is not recommended during treatment. Dutasteride Pregnancy And Lactation Text: This medication is absolutely contraindicated in women, especially during pregnancy and breast feeding. Feminization of male fetuses is possible if taking while pregnant. Erythromycin Pregnancy And Lactation Text: This medication is Pregnancy Category B and is considered safe during pregnancy. It is also excreted in breast milk. Topical Retinoid counseling:  Patient advised to apply a pea-sized amount only at bedtime and wait 30 minutes after washing their face before applying.  If too drying, patient may add a non-comedogenic moisturizer. The patient verbalized understanding of the proper use and possible adverse effects of retinoids.  All of the patient's questions and concerns were addressed. Vtama Pregnancy And Lactation Text: It is unknown if this medication can cause problems during pregnancy and breastfeeding. 5-Fu Pregnancy And Lactation Text: This medication is Pregnancy Category X and contraindicated in pregnancy and in women who may become pregnant. It is unknown if this medication is excreted in breast milk. Valtrex Pregnancy And Lactation Text: this medication is Pregnancy Category B and is considered safe during pregnancy. This medication is not directly found in breast milk but it's metabolite acyclovir is present. Glycopyrrolate Pregnancy And Lactation Text: This medication is Pregnancy Category B and is considered safe during pregnancy. It is unknown if it is excreted breast milk. Ketoconazole Counseling:   Patient counseled regarding improving absorption with orange juice.  Adverse effects include but are not limited to breast enlargement, headache, diarrhea, nausea, upset stomach, liver function test abnormalities, taste disturbance, and stomach pain.  There is a rare possibility of liver failure that can occur when taking ketoconazole. The patient understands that monitoring of LFTs may be required, especially at baseline. The patient verbalized understanding of the proper use and possible adverse effects of ketoconazole.  All of the patient's questions and concerns were addressed. Azelaic Acid Pregnancy And Lactation Text: This medication is considered safe during pregnancy and breast feeding. Olanzapine Pregnancy And Lactation Text: This medication is pregnancy category C.   There are no adequate and well controlled trials with olanzapine in pregnant females.  Olanzapine should be used during pregnancy only if the potential benefit justifies the potential risk to the fetus.   In a study in lactating healthy women, olanzapine was excreted in breast milk.  It is recommended that women taking olanzapine should not breast feed. Protopic Pregnancy And Lactation Text: This medication is Pregnancy Category C. It is unknown if this medication is excreted in breast milk when applied topically. Sarecycline Pregnancy And Lactation Text: This medication is Pregnancy Category D and not consider safe during pregnancy. It is also excreted in breast milk. Bexarotene Pregnancy And Lactation Text: This medication is Pregnancy Category X and should not be given to women who are pregnant or may become pregnant. This medication should not be used if you are breast feeding. Topical Steroids Counseling: I discussed with the patient that prolonged use of topical steroids can result in the increased appearance of superficial blood vessels (telangiectasias), lightening (hypopigmentation) and thinning of the skin (atrophy).  Patient understands to avoid using high potency steroids in skin folds, the groin or the face.  The patient verbalized understanding of the proper use and possible adverse effects of topical steroids.  All of the patient's questions and concerns were addressed. Clofazimine Counseling:  I discussed with the patient the risks of clofazimine including but not limited to skin and eye pigmentation, liver damage, nausea/vomiting, gastrointestinal bleeding and allergy. Propranolol Counseling:  I discussed with the patient the risks of propranolol including but not limited to low heart rate, low blood pressure, low blood sugar, restlessness and increased cold sensitivity. They should call the office if they experience any of these side effects. Bactrim Counseling:  I discussed with the patient the risks of sulfa antibiotics including but not limited to GI upset, allergic reaction, drug rash, diarrhea, dizziness, photosensitivity, and yeast infections.  Rarely, more serious reactions can occur including but not limited to aplastic anemia, agranulocytosis, methemoglobinemia, blood dyscrasias, liver or kidney failure, lung infiltrates or desquamative/blistering drug rashes. Rinvoq Counseling: I discussed with the patient the risks of Rinvoq therapy including but not limited to upper respiratory tract infections, shingles, cold sores, bronchitis, nausea, cough, fever, acne, and headache. Live vaccines should be avoided.  This medication has been linked to serious infections; higher rate of mortality; malignancy and lymphoproliferative disorders; major adverse cardiovascular events; thrombosis; thrombocytopenia, anemia, and neutropenia; lipid elevations; liver enzyme elevations; and gastrointestinal perforations. Finasteride Male Counseling: Finasteride Counseling:  I discussed with the patient the risks of use of finasteride including but not limited to decreased libido, decreased ejaculate volume, gynecomastia, and depression. Women should not handle medication.  All of the patient's questions and concerns were addressed. Albendazole Pregnancy And Lactation Text: This medication is Pregnancy Category C and it isn't known if it is safe during pregnancy. It is also excreted in breast milk. Methotrexate Pregnancy And Lactation Text: This medication is Pregnancy Category X and is known to cause fetal harm. This medication is excreted in breast milk. Erivedge Counseling- I discussed with the patient the risks of Erivedge including but not limited to nausea, vomiting, diarrhea, constipation, weight loss, changes in the sense of taste, decreased appetite, muscle spasms, and hair loss.  The patient verbalized understanding of the proper use and possible adverse effects of Erivedge.  All of the patient's questions and concerns were addressed. Cosentyx Counseling:  I discussed with the patient the risks of Cosentyx including but not limited to worsening of Crohn's disease, immunosuppression, allergic reactions and infections.  The patient understands that monitoring is required including a PPD at baseline and must alert us or the primary physician if symptoms of infection or other concerning signs are noted. Drysol Counseling:  I discussed with the patient the risks of drysol/aluminum chloride including but not limited to skin rash, itching, irritation, burning. Zoryve Counseling:  I discussed with the patient that Zoryve is not for use in the eyes, mouth or vagina. The most commonly reported side effects include diarrhea, headache, insomnia, application site pain, upper respiratory tract infections, and urinary tract infections.  All of the patient's questions and concerns were addressed. Metronidazole Counseling:  I discussed with the patient the risks of metronidazole including but not limited to seizures, nausea/vomiting, a metallic taste in the mouth, nausea/vomiting and severe allergy. Use Enhanced Medication Counseling?: No Hydroxychloroquine Counseling:  I discussed with the patient that a baseline ophthalmologic exam is needed at the start of therapy and every year thereafter while on therapy. A CBC may also be warranted for monitoring.  The side effects of this medication were discussed with the patient, including but not limited to agranulocytosis, aplastic anemia, seizures, rashes, retinopathy, and liver toxicity. Patient instructed to call the office should any adverse effect occur.  The patient verbalized understanding of the proper use and possible adverse effects of Plaquenil.  All the patient's questions and concerns were addressed. Benzoyl Peroxide Counseling: Patient counseled that medicine may cause skin irritation and bleach clothing.  In the event of skin irritation, the patient was advised to reduce the amount of the drug applied or use it less frequently.   The patient verbalized understanding of the proper use and possible adverse effects of benzoyl peroxide.  All of the patient's questions and concerns were addressed. Infliximab Counseling:  I discussed with the patient the risks of infliximab including but not limited to myelosuppression, immunosuppression, autoimmune hepatitis, demyelinating diseases, lymphoma, and serious infections.  The patient understands that monitoring is required including a PPD at baseline and must alert us or the primary physician if symptoms of infection or other concerning signs are noted. Qbrexza Counseling:  I discussed with the patient the risks of Qbrexza including but not limited to headache, mydriasis, blurred vision, dry eyes, nasal dryness, dry mouth, dry throat, dry skin, urinary hesitation, and constipation.  Local skin reactions including erythema, burning, stinging, and itching can also occur. Propranolol Pregnancy And Lactation Text: This medication is Pregnancy Category C and it isn't known if it is safe during pregnancy. It is excreted in breast milk. Tetracycline Counseling: Patient counseled regarding possible photosensitivity and increased risk for sunburn.  Patient instructed to avoid sunlight, if possible.  When exposed to sunlight, patients should wear protective clothing, sunglasses, and sunscreen.  The patient was instructed to call the office immediately if the following severe adverse effects occur:  hearing changes, easy bruising/bleeding, severe headache, or vision changes.  The patient verbalized understanding of the proper use and possible adverse effects of tetracycline.  All of the patient's questions and concerns were addressed. Patient understands to avoid pregnancy while on therapy due to potential birth defects. Oral Minoxidil Counseling- I discussed with the patient the risks of oral minoxidil including but not limited to shortness of breath, swelling of the feet or ankles, dizziness, lightheadedness, unwanted hair growth and allergic reaction.  The patient verbalized understanding of the proper use and possible adverse effects of oral minoxidil.  All of the patient's questions and concerns were addressed. Klisyri Counseling:  I discussed with the patient the risks of Klisyri including but not limited to erythema, scaling, itching, weeping, crusting, and pain. Ivermectin Counseling:  Patient instructed to take medication on an empty stomach with a full glass of water.  Patient informed of potential adverse effects including but not limited to nausea, diarrhea, dizziness, itching, and swelling of the extremities or lymph nodes.  The patient verbalized understanding of the proper use and possible adverse effects of ivermectin.  All of the patient's questions and concerns were addressed. Topical Steroids Applications Pregnancy And Lactation Text: Most topical steroids are considered safe to use during pregnancy and lactation.  Any topical steroid applied to the breast or nipple should be washed off before breastfeeding. Finasteride Female Counseling: Finasteride Counseling:  I discussed with the patient the risks of use of finasteride including but not limited to decreased libido and sexual dysfunction. Explained the teratogenic nature of the medication and stressed the importance of not getting pregnant during treatment. All of the patient's questions and concerns were addressed. Rinvoq Pregnancy And Lactation Text: Based on animal studies, Rinvoq may cause embryo-fetal harm when administered to pregnant women.  The medication should not be used in pregnancy.  Breastfeeding is not recommended during treatment and for 6 days after the last dose. Bactrim Pregnancy And Lactation Text: This medication is Pregnancy Category D and is known to cause fetal risk.  It is also excreted in breast milk. Prednisone Counseling:  I discussed with the patient the risks of prolonged use of prednisone including but not limited to weight gain, insomnia, osteoporosis, mood changes, diabetes, susceptibility to infection, glaucoma and high blood pressure.  In cases where prednisone use is prolonged, patients should be monitored with blood pressure checks, serum glucose levels and an eye exam.  Additionally, the patient may need to be placed on GI prophylaxis, PCP prophylaxis, and calcium and vitamin D supplementation and/or a bisphosphonate.  The patient verbalized understanding of the proper use and the possible adverse effects of prednisone.  All of the patient's questions and concerns were addressed. Ketoconazole Pregnancy And Lactation Text: This medication is Pregnancy Category C and it isn't know if it is safe during pregnancy. It is also excreted in breast milk and breast feeding isn't recommended. Stelara Counseling:  I discussed with the patient the risks of ustekinumab including but not limited to immunosuppression, malignancy, posterior leukoencephalopathy syndrome, and serious infections.  The patient understands that monitoring is required including a PPD at baseline and must alert us or the primary physician if symptoms of infection or other concerning signs are noted. Isotretinoin Counseling: Patient should get monthly blood tests, not donate blood, not drive at night if vision affected, not share medication, and not undergo elective surgery for 6 months after tx completed. Side effects reviewed, pt to contact office should one occur. Azathioprine Pregnancy And Lactation Text: This medication is Pregnancy Category D and isn't considered safe during pregnancy. It is unknown if this medication is excreted in breast milk. Hydroxychloroquine Pregnancy And Lactation Text: This medication has been shown to cause fetal harm but it isn't assigned a Pregnancy Risk Category. There are small amounts excreted in breast milk. Benzoyl Peroxide Pregnancy And Lactation Text: This medication is Pregnancy Category C. It is unknown if benzoyl peroxide is excreted in breast milk. Tazorac Counseling:  Patient advised that medication is irritating and drying.  Patient may need to apply sparingly and wash off after an hour before eventually leaving it on overnight.  The patient verbalized understanding of the proper use and possible adverse effects of tazorac.  All of the patient's questions and concerns were addressed. Metronidazole Pregnancy And Lactation Text: This medication is Pregnancy Category B and considered safe during pregnancy.  It is also excreted in breast milk. Oral Minoxidil Pregnancy And Lactation Text: This medication should only be used when clearly needed if you are pregnant, attempting to become pregnant or breast feeding. Klisyri Pregnancy And Lactation Text: It is unknown if this medication can harm a developing fetus or if it is excreted in breast milk. Qbrexza Pregnancy And Lactation Text: There is no available data on Qbrexza use in pregnant women.  There is no available data on Qbrexza use in lactation. Topical Sulfur Applications Counseling: Topical Sulfur Counseling: Patient counseled that this medication may cause skin irritation or allergic reactions.  In the event of skin irritation, the patient was advised to reduce the amount of the drug applied or use it less frequently.   The patient verbalized understanding of the proper use and possible adverse effects of topical sulfur application.  All of the patient's questions and concerns were addressed. SSKI Counseling:  I discussed with the patient the risks of SSKI including but not limited to thyroid abnormalities, metallic taste, GI upset, fever, headache, acne, arthralgias, paraesthesias, lymphadenopathy, easy bleeding, arrhythmias, and allergic reaction. Colchicine Counseling:  Patient counseled regarding adverse effects including but not limited to stomach upset (nausea, vomiting, stomach pain, or diarrhea).  Patient instructed to limit alcohol consumption while taking this medication.  Colchicine may reduce blood counts especially with prolonged use.  The patient understands that monitoring of kidney function and blood counts may be required, especially at baseline. The patient verbalized understanding of the proper use and possible adverse effects of colchicine.  All of the patient's questions and concerns were addressed. Libtayo Counseling- I discussed with the patient the risks of Libtayo including but not limited to nausea, vomiting, diarrhea, and bone or muscle pain.  The patient verbalized understanding of the proper use and possible adverse effects of Libtayo.  All of the patient's questions and concerns were addressed. Cephalexin Counseling: I counseled the patient regarding use of cephalexin as an antibiotic for prophylactic and/or therapeutic purposes. Cephalexin (commonly prescribed under brand name Keflex) is a cephalosporin antibiotic which is active against numerous classes of bacteria, including most skin bacteria. Side effects may include nausea, diarrhea, gastrointestinal upset, rash, hives, yeast infections, and in rare cases, hepatitis, kidney disease, seizures, fever, confusion, neurologic symptoms, and others. Patients with severe allergies to penicillin medications are cautioned that there is about a 10% incidence of cross-reactivity with cephalosporins. When possible, patients with penicillin allergies should use alternatives to cephalosporins for antibiotic therapy. Dupixent Counseling: I discussed with the patient the risks of dupilumab including but not limited to eye infection and irritation, cold sores, injection site reactions, worsening of asthma, allergic reactions and increased risk of parasitic infection.  Live vaccines should be avoided while taking dupilumab. Dupilumab will also interact with certain medications such as warfarin and cyclosporine. The patient understands that monitoring is required and they must alert us or the primary physician if symptoms of infection or other concerning signs are noted. Cimetidine Counseling:  I discussed with the patient the risks of Cimetidine including but not limited to gynecomastia, headache, diarrhea, nausea, drowsiness, arrhythmias, pancreatitis, skin rashes, psychosis, bone marrow suppression and kidney toxicity. Isotretinoin Pregnancy And Lactation Text: This medication is Pregnancy Category X and is considered extremely dangerous during pregnancy. It is unknown if it is excreted in breast milk. Finasteride Pregnancy And Lactation Text: This medication is absolutely contraindicated during pregnancy. It is unknown if it is excreted in breast milk. Sotyktu Counseling:  I discussed the most common side effects of Sotyktu including: common cold, sore throat, sinus infections, cold sores, canker sores, folliculitis, and acne.? I also discussed more serious side effects of Sotyktu including but not limited to: serious allergic reactions; increased risk for infections such as TB; cancers such as lymphomas; rhabdomyolysis and elevated CPK; and elevated triglycerides and liver enzymes.? Terbinafine Counseling: Patient counseling regarding adverse effects of terbinafine including but not limited to headache, diarrhea, rash, upset stomach, liver function test abnormalities, itching, taste/smell disturbance, nausea, abdominal pain, and flatulence.  There is a rare possibility of liver failure that can occur when taking terbinafine.  The patient understands that a baseline LFT and kidney function test may be required. The patient verbalized understanding of the proper use and possible adverse effects of terbinafine.  All of the patient's questions and concerns were addressed. Minocycline Counseling: Patient advised regarding possible photosensitivity and discoloration of the teeth, skin, lips, tongue and gums.  Patient instructed to avoid sunlight, if possible.  When exposed to sunlight, patients should wear protective clothing, sunglasses, and sunscreen.  The patient was instructed to call the office immediately if the following severe adverse effects occur:  hearing changes, easy bruising/bleeding, severe headache, or vision changes.  The patient verbalized understanding of the proper use and possible adverse effects of minocycline.  All of the patient's questions and concerns were addressed. Tazorac Pregnancy And Lactation Text: This medication is not safe during pregnancy. It is unknown if this medication is excreted in breast milk. Carac Counseling:  I discussed with the patient the risks of Carac including but not limited to erythema, scaling, itching, weeping, crusting, and pain. Elidel Counseling: Patient may experience a mild burning sensation during topical application. Elidel is not approved in children less than 2 years of age. There have been case reports of hematologic and skin malignancies in patients using topical calcineurin inhibitors although causality is questionable. Zyclara Counseling:  I discussed with the patient the risks of imiquimod including but not limited to erythema, scaling, itching, weeping, crusting, and pain.  Patient understands that the inflammatory response to imiquimod is variable from person to person and was educated regarded proper titration schedule.  If flu-like symptoms develop, patient knows to discontinue the medication and contact us. Low Dose Naltrexone Counseling- I discussed with the patient the potential risks and side effects of low dose naltrexone including but not limited to: more vivid dreams, headaches, nausea, vomiting, abdominal pain, fatigue, dizziness, and anxiety. Rituxan Counseling:  I discussed with the patient the risks of Rituxan infusions. Side effects can include infusion reactions, severe drug rashes including mucocutaneous reactions, reactivation of latent hepatitis and other infections and rarely progressive multifocal leukoencephalopathy.  All of the patient's questions and concerns were addressed. Sski Pregnancy And Lactation Text: This medication is Pregnancy Category D and isn't considered safe during pregnancy. It is excreted in breast milk. Cellcept Counseling:  I discussed with the patient the risks of mycophenolate mofetil including but not limited to infection/immunosuppression, GI upset, hypokalemia, hypercholesterolemia, bone marrow suppression, lymphoproliferative disorders, malignancy, GI ulceration/bleed/perforation, colitis, interstitial lung disease, kidney failure, progressive multifocal leukoencephalopathy, and birth defects.  The patient understands that monitoring is required including a baseline creatinine and regular CBC testing. In addition, patient must alert us immediately if symptoms of infection or other concerning signs are noted. Topical Sulfur Applications Pregnancy And Lactation Text: This medication is considered safe during pregnancy and breast feeding secondary to limited systemic absorption. Terbinafine Pregnancy And Lactation Text: This medication is Pregnancy Category B and is considered safe during pregnancy. It is also excreted in breast milk and breast feeding isn't recommended. Sotyktu Pregnancy And Lactation Text: There is insufficient data to evaluate whether or not Sotyktu is safe to use during pregnancy.? ?It is not known if Sotyktu passes into breast milk and whether or not it is safe to use when breastfeeding.?? Minoxidil Counseling: Minoxidil is a topical medication which can increase blood flow where it is applied. It is uncertain how this medication increases hair growth. Side effects are uncommon and include stinging and allergic reactions. Rhofade Counseling: Rhofade is a topical medication which can decrease superficial blood flow where applied. Side effects are uncommon and include stinging, redness and allergic reactions. Birth Control Pills Counseling: Birth Control Pill Counseling: I discussed with the patient the potential side effects of OCPs including but not limited to increased risk of stroke, heart attack, thrombophlebitis, deep venous thrombosis, hepatic adenomas, breast changes, GI upset, headaches, and depression.  The patient verbalized understanding of the proper use and possible adverse effects of OCPs. All of the patient's questions and concerns were addressed. Cephalexin Pregnancy And Lactation Text: This medication is Pregnancy Category B and considered safe during pregnancy.  It is also excreted in breast milk but can be used safely for shorter doses. Otezla Counseling: The side effects of Otezla were discussed with the patient, including but not limited to worsening or new depression, weight loss, diarrhea, nausea, upper respiratory tract infection, and headache. Patient instructed to call the office should any adverse effect occur.  The patient verbalized understanding of the proper use and possible adverse effects of Otezla.  All the patient's questions and concerns were addressed. Dupixent Pregnancy And Lactation Text: This medication likely crosses the placenta but the risk for the fetus is uncertain. This medication is excreted in breast milk. Libtayo Pregnancy And Lactation Text: This medication is contraindicated in pregnancy and when breast feeding. High Dose Vitamin A Counseling: Side effects reviewed, pt to contact office should one occur. Taltz Counseling: I discussed with the patient the risks of ixekizumab including but not limited to immunosuppression, serious infections, worsening of inflammatory bowel disease and drug reactions.  The patient understands that monitoring is required including a PPD at baseline and must alert us or the primary physician if symptoms of infection or other concerning signs are noted. Topical Clindamycin Counseling: Patient counseled that this medication may cause skin irritation or allergic reactions.  In the event of skin irritation, the patient was advised to reduce the amount of the drug applied or use it less frequently.   The patient verbalized understanding of the proper use and possible adverse effects of clindamycin.  All of the patient's questions and concerns were addressed. Low Dose Naltrexone Pregnancy And Lactation Text: Naltrexone is pregnancy category C.  There have been no adequate and well-controlled studies in pregnant women.  It should be used in pregnancy only if the potential benefit justifies the potential risk to the fetus.   Limited data indicates that naltrexone is minimally excreted into breastmilk. Cibinqo Counseling: I discussed with the patient the risks of Cibinqo therapy including but not limited to common cold, nausea, headache, cold sores, increased blood CPK levels, dizziness, UTIs, fatigue, acne, and vomitting. Live vaccines should be avoided.  This medication has been linked to serious infections; higher rate of mortality; malignancy and lymphoproliferative disorders; major adverse cardiovascular events; thrombosis; thrombocytopenia and lymphopenia; lipid elevations; and retinal detachment. Detail Level: Simple Fluconazole Counseling:  Patient counseled regarding adverse effects of fluconazole including but not limited to headache, diarrhea, nausea, upset stomach, liver function test abnormalities, taste disturbance, and stomach pain.  There is a rare possibility of liver failure that can occur when taking fluconazole.  The patient understands that monitoring of LFTs and kidney function test may be required, especially at baseline. The patient verbalized understanding of the proper use and possible adverse effects of fluconazole.  All of the patient's questions and concerns were addressed. Rituxan Pregnancy And Lactation Text: This medication is Pregnancy Category C and it isn't know if it is safe during pregnancy. It is unknown if this medication is excreted in breast milk but similar antibodies are known to be excreted. Adbry Counseling: I discussed with the patient the risks of tralokinumab including but not limited to eye infection and irritation, cold sores, injection site reactions, worsening of asthma, allergic reactions and increased risk of parasitic infection.  Live vaccines should be avoided while taking tralokinumab. The patient understands that monitoring is required and they must alert us or the primary physician if symptoms of infection or other concerning signs are noted. Dapsone Counseling: I discussed with the patient the risks of dapsone including but not limited to hemolytic anemia, agranulocytosis, rashes, methemoglobinemia, kidney failure, peripheral neuropathy, headaches, GI upset, and liver toxicity.  Patients who start dapsone require monitoring including baseline LFTs and weekly CBCs for the first month, then every month thereafter.  The patient verbalized understanding of the proper use and possible adverse effects of dapsone.  All of the patient's questions and concerns were addressed. Thalidomide Counseling: I discussed with the patient the risks of thalidomide including but not limited to birth defects, anxiety, weakness, chest pain, dizziness, cough and severe allergy. Odomzo Counseling- I discussed with the patient the risks of Odomzo including but not limited to nausea, vomiting, diarrhea, constipation, weight loss, changes in the sense of taste, decreased appetite, muscle spasms, and hair loss.  The patient verbalized understanding of the proper use and possible adverse effects of Odomzo.  All of the patient's questions and concerns were addressed. Otezla Pregnancy And Lactation Text: This medication is Pregnancy Category C and it isn't known if it is safe during pregnancy. It is unknown if it is excreted in breast milk. Wartpeel Counseling:  I discussed with the patient the risks of Wartpeel including but not limited to erythema, scaling, itching, weeping, crusting, and pain. High Dose Vitamin A Pregnancy And Lactation Text: High dose vitamin A therapy is contraindicated during pregnancy and breast feeding. Enbrel Counseling:  I discussed with the patient the risks of etanercept including but not limited to myelosuppression, immunosuppression, autoimmune hepatitis, demyelinating diseases, lymphoma, and infections.  The patient understands that monitoring is required including a PPD at baseline and must alert us or the primary physician if symptoms of infection or other concerning signs are noted. Xeljanz Counseling: I discussed with the patient the risks of Xeljanz therapy including increased risk of infection, liver issues, headache, diarrhea, or cold symptoms. Live vaccines should be avoided. They were instructed to call if they have any problems. Eucrisa Counseling: Patient may experience a mild burning sensation during topical application. Eucrisa is not approved in children less than 2 years of age. Birth Control Pills Pregnancy And Lactation Text: This medication should be avoided if pregnant and for the first 30 days post-partum. Clindamycin Counseling: I counseled the patient regarding use of clindamycin as an antibiotic for prophylactic and/or therapeutic purposes. Clindamycin is active against numerous classes of bacteria, including skin bacteria. Side effects may include nausea, diarrhea, gastrointestinal upset, rash, hives, yeast infections, and in rare cases, colitis. Doxepin Counseling:  Patient advised that the medication is sedating and not to drive a car after taking this medication. Patient informed of potential adverse effects including but not limited to dry mouth, urinary retention, and blurry vision.  The patient verbalized understanding of the proper use and possible adverse effects of doxepin.  All of the patient's questions and concerns were addressed. Niacinamide Counseling: I recommended taking niacin or niacinamide, also know as vitamin B3, twice daily. Recent evidence suggests that taking vitamin B3 (500 mg twice daily) can reduce the risk of actinic keratoses and non-melanoma skin cancers. Side effects of vitamin B3 include flushing and headache. Siliq Counseling:  I discussed with the patient the risks of Siliq including but not limited to new or worsening depression, suicidal thoughts and behavior, immunosuppression, malignancy, posterior leukoencephalopathy syndrome, and serious infections.  The patient understands that monitoring is required including a PPD at baseline and must alert us or the primary physician if symptoms of infection or other concerning signs are noted. There is also a special program designed to monitor depression which is required with Siliq. Quinolones Counseling:  I discussed with the patient the risks of fluoroquinolones including but not limited to GI upset, allergic reaction, drug rash, diarrhea, dizziness, photosensitivity, yeast infections, liver function test abnormalities, tendonitis/tendon rupture. Cibinqo Pregnancy And Lactation Text: It is unknown if this medication will adversely affect pregnancy or breast feeding.  You should not take this medication if you are currently pregnant or planning a pregnancy or while breastfeeding. Adbry Pregnancy And Lactation Text: It is unknown if this medication will adversely affect pregnancy or breast feeding. Mirvaso Counseling: Mirvaso is a topical medication which can decrease superficial blood flow where applied. Side effects are uncommon and include stinging, redness and allergic reactions. Cyclophosphamide Counseling:  I discussed with the patient the risks of cyclophosphamide including but not limited to hair loss, hormonal abnormalities, decreased fertility, abdominal pain, diarrhea, nausea and vomiting, bone marrow suppression and infection. The patient understands that monitoring is required while taking this medication. Dapsone Pregnancy And Lactation Text: This medication is Pregnancy Category C and is not considered safe during pregnancy or breast feeding.

## 2024-06-28 NOTE — ED PROVIDER NOTE - CROS ED PSYCH ALL NEG
Please drink plenty of fluids.  Please get plenty of rest.  Please return here or go to the Emergency Department for any concerns or worsening of condition.  If you were not prescribed an anti-inflammatory medication, and if you do not have any history of stomach/intestinal ulcers, or kidney disease, or are not taking a blood thinner such as Coumadin, Plavix, Pradaxa, Eloquis, or Xaralta for example, it is OK to take over the counter Ibuprofen or Advil or Motrin or Aleve as directed.  Do not take these medications on an empty stomach.  Rest, ice, compression and elevation to the affected joint or limb as needed.  Please follow up with your primary care doctor or specialist as needed.    
negative...

## 2025-03-25 NOTE — PHYSICAL THERAPY INITIAL EVALUATION ADULT - IMPAIRMENTS CONTRIBUTING TO GAIT DEVIATIONS, PT EVAL
The patient is a 63y Male complaining of fall. impaired postural control/impaired balance/decreased strength

## 2025-04-05 NOTE — DIETITIAN INITIAL EVALUATION ADULT. - ORAL INTAKE PTA
Per wife- patient eating well PTA- consuming 3 meals throughout the day. Breakfast- cereal, english muffin, eggs, guerra, Lunch- something small like fruit salad, crackers and cheese and dinner- some type of protein, starch and vegetable/good
No